# Patient Record
Sex: FEMALE | Race: WHITE | NOT HISPANIC OR LATINO | Employment: UNEMPLOYED | URBAN - METROPOLITAN AREA
[De-identification: names, ages, dates, MRNs, and addresses within clinical notes are randomized per-mention and may not be internally consistent; named-entity substitution may affect disease eponyms.]

---

## 2018-08-03 ENCOUNTER — HOSPITAL ENCOUNTER (EMERGENCY)
Facility: HOSPITAL | Age: 38
Discharge: HOME | End: 2018-08-03
Attending: FAMILY MEDICINE

## 2018-08-03 VITALS
SYSTOLIC BLOOD PRESSURE: 112 MMHG | OXYGEN SATURATION: 97 % | HEART RATE: 76 BPM | BODY MASS INDEX: 27.64 KG/M2 | RESPIRATION RATE: 20 BRPM | HEIGHT: 66 IN | DIASTOLIC BLOOD PRESSURE: 80 MMHG | WEIGHT: 172 LBS | TEMPERATURE: 98 F

## 2018-08-03 DIAGNOSIS — T78.40XA ALLERGIC REACTION, INITIAL ENCOUNTER: Primary | ICD-10-CM

## 2018-08-03 PROCEDURE — 96374 THER/PROPH/DIAG INJ IV PUSH: CPT

## 2018-08-03 PROCEDURE — 96372 THER/PROPH/DIAG INJ SC/IM: CPT

## 2018-08-03 PROCEDURE — 63600175 PHARM REV CODE 636 W HCPCS: Performed by: FAMILY MEDICINE

## 2018-08-03 PROCEDURE — 99284 EMERGENCY DEPT VISIT MOD MDM: CPT | Mod: 25

## 2018-08-03 PROCEDURE — 96375 TX/PRO/DX INJ NEW DRUG ADDON: CPT

## 2018-08-03 PROCEDURE — S0028 INJECTION, FAMOTIDINE, 20 MG: HCPCS

## 2018-08-03 PROCEDURE — 63600175 PHARM REV CODE 636 W HCPCS

## 2018-08-03 PROCEDURE — 96361 HYDRATE IV INFUSION ADD-ON: CPT

## 2018-08-03 PROCEDURE — 25000003 PHARM REV CODE 250

## 2018-08-03 PROCEDURE — 25000003 PHARM REV CODE 250: Performed by: FAMILY MEDICINE

## 2018-08-03 PROCEDURE — S0028 INJECTION, FAMOTIDINE, 20 MG: HCPCS | Performed by: FAMILY MEDICINE

## 2018-08-03 RX ORDER — ATORVASTATIN CALCIUM 10 MG/1
10 TABLET, FILM COATED ORAL DAILY
COMMUNITY

## 2018-08-03 RX ORDER — FAMOTIDINE 10 MG/ML
40 INJECTION INTRAVENOUS
Status: COMPLETED | OUTPATIENT
Start: 2018-08-03 | End: 2018-08-03

## 2018-08-03 RX ORDER — EPINEPHRINE 0.3 MG/.3ML
0.3 INJECTION SUBCUTANEOUS
Status: COMPLETED | OUTPATIENT
Start: 2018-08-03 | End: 2018-08-03

## 2018-08-03 RX ORDER — DEXAMETHASONE SODIUM PHOSPHATE 100 MG/10ML
INJECTION INTRAMUSCULAR; INTRAVENOUS
Status: COMPLETED
Start: 2018-08-03 | End: 2018-08-03

## 2018-08-03 RX ORDER — LISINOPRIL 10 MG/1
20 TABLET ORAL DAILY
COMMUNITY
End: 2020-03-02

## 2018-08-03 RX ORDER — LORAZEPAM 2 MG/ML
1 INJECTION INTRAMUSCULAR
Status: COMPLETED | OUTPATIENT
Start: 2018-08-03 | End: 2018-08-03

## 2018-08-03 RX ORDER — DEXAMETHASONE SODIUM PHOSPHATE 100 MG/10ML
10 INJECTION INTRAMUSCULAR; INTRAVENOUS
Status: COMPLETED | OUTPATIENT
Start: 2018-08-03 | End: 2018-08-03

## 2018-08-03 RX ORDER — METHYLPREDNISOLONE 4 MG/1
TABLET ORAL
Qty: 1 PACKAGE | Refills: 0 | Status: SHIPPED | OUTPATIENT
Start: 2018-08-03 | End: 2018-08-24

## 2018-08-03 RX ORDER — DIPHENHYDRAMINE HYDROCHLORIDE 50 MG/ML
50 INJECTION INTRAMUSCULAR; INTRAVENOUS
Status: COMPLETED | OUTPATIENT
Start: 2018-08-03 | End: 2018-08-03

## 2018-08-03 RX ORDER — FAMOTIDINE 10 MG/ML
20 INJECTION INTRAVENOUS
Status: COMPLETED | OUTPATIENT
Start: 2018-08-03 | End: 2018-08-03

## 2018-08-03 RX ORDER — DIPHENHYDRAMINE HYDROCHLORIDE 50 MG/ML
INJECTION INTRAMUSCULAR; INTRAVENOUS
Status: COMPLETED
Start: 2018-08-03 | End: 2018-08-03

## 2018-08-03 RX ORDER — DIPHENHYDRAMINE HYDROCHLORIDE 50 MG/ML
25 INJECTION INTRAMUSCULAR; INTRAVENOUS
Status: COMPLETED | OUTPATIENT
Start: 2018-08-03 | End: 2018-08-03

## 2018-08-03 RX ORDER — FAMOTIDINE 10 MG/ML
INJECTION INTRAVENOUS
Status: COMPLETED
Start: 2018-08-03 | End: 2018-08-03

## 2018-08-03 RX ADMIN — DIPHENHYDRAMINE HYDROCHLORIDE 50 MG: 50 INJECTION INTRAMUSCULAR; INTRAVENOUS at 03:08

## 2018-08-03 RX ADMIN — EPINEPHRINE 0.3 MG: 0.3 INJECTION INTRAMUSCULAR at 04:08

## 2018-08-03 RX ADMIN — DIPHENHYDRAMINE HYDROCHLORIDE 25 MG: 50 INJECTION, SOLUTION INTRAMUSCULAR; INTRAVENOUS at 04:08

## 2018-08-03 RX ADMIN — SODIUM CHLORIDE 1000 ML: 9 INJECTION, SOLUTION INTRAVENOUS at 03:08

## 2018-08-03 RX ADMIN — DEXAMETHASONE SODIUM PHOSPHATE 10 MG: 100 INJECTION INTRAMUSCULAR; INTRAVENOUS at 03:08

## 2018-08-03 RX ADMIN — FAMOTIDINE 40 MG: 10 INJECTION, SOLUTION INTRAVENOUS at 03:08

## 2018-08-03 RX ADMIN — DEXAMETHASONE SODIUM PHOSPHATE 10 MG: 10 INJECTION INTRAMUSCULAR; INTRAVENOUS at 03:08

## 2018-08-03 RX ADMIN — DIPHENHYDRAMINE HYDROCHLORIDE 50 MG: 50 INJECTION, SOLUTION INTRAMUSCULAR; INTRAVENOUS at 03:08

## 2018-08-03 RX ADMIN — FAMOTIDINE 20 MG: 10 INJECTION, SOLUTION INTRAVENOUS at 04:08

## 2018-08-03 RX ADMIN — FAMOTIDINE 40 MG: 10 INJECTION INTRAVENOUS at 03:08

## 2018-08-03 RX ADMIN — LORAZEPAM 1 MG: 2 INJECTION INTRAMUSCULAR; INTRAVENOUS at 04:08

## 2018-08-03 NOTE — ED PROVIDER NOTES
Encounter Date: 8/3/2018       History     Chief Complaint   Patient presents with    Allergic Reaction     insect bite to face, swelling on left side of face     Patient is a 37-year-old young woman with a history of severe allergic reaction to bee and wasp stings.  She was working in the garden today and did not feel herself get stone.  She went inside to take a shower and developed left cheek swelling that has steadily progressed.  She states she may have been bit by a mosquito but does not recall any other sting.          Review of patient's allergies indicates:   Allergen Reactions    Iodine and iodide containing products     Pcn [penicillins]      Past Medical History:   Diagnosis Date    Anxiety     Hyperlipidemia     Hypertension      History reviewed. No pertinent surgical history.  History reviewed. No pertinent family history.  Social History   Substance Use Topics    Smoking status: Current Every Day Smoker    Smokeless tobacco: Not on file    Alcohol use Yes      Comment: occ     Review of Systems   Constitutional: Negative for chills, fatigue and fever.   HENT: Positive for facial swelling. Negative for congestion, ear pain, rhinorrhea, sinus pain, sinus pressure and sore throat.    Eyes: Negative for photophobia and redness.   Respiratory: Negative for cough, shortness of breath and wheezing.    Cardiovascular: Negative for chest pain, palpitations and leg swelling.   Gastrointestinal: Negative for abdominal pain, constipation, diarrhea and nausea.   Endocrine: Negative for polydipsia, polyphagia and polyuria.   Genitourinary: Negative for difficulty urinating, dysuria, flank pain, hematuria and urgency.   Musculoskeletal: Negative for arthralgias, back pain and joint swelling.   Skin: Negative for color change.   Neurological: Negative for dizziness, seizures, syncope, weakness and headaches.   Hematological: Negative for adenopathy.   Psychiatric/Behavioral: Negative for sleep disturbance  and suicidal ideas.   All other systems reviewed and are negative.      Physical Exam     Initial Vitals [08/03/18 1440]   BP Pulse Resp Temp SpO2   (!) 150/93 88 20 98.3 °F (36.8 °C) 100 %      MAP       --         Physical Exam    Nursing note and vitals reviewed.  Constitutional: Vital signs are normal. She appears well-developed and well-nourished.   HENT:   Head: Normocephalic and atraumatic.   Edema to L cheek   Eyes: Lids are normal.   Neck: Trachea normal, normal range of motion and phonation normal. Neck supple.   Cardiovascular: Normal rate, regular rhythm, normal heart sounds, intact distal pulses and normal pulses.   Abdominal: Soft. Normal appearance and bowel sounds are normal.   Musculoskeletal: Normal range of motion.   Neurological: She is alert. GCS eye subscore is 4. GCS verbal subscore is 5. GCS motor subscore is 6.   Skin: Skin is warm, dry and intact. Capillary refill takes less than 2 seconds.   Psychiatric: She has a normal mood and affect. Her speech is normal and behavior is normal. Judgment and thought content normal. Cognition and memory are normal.         ED Course   Procedures  Labs Reviewed - No data to display       Imaging Results    None          Medical Decision Making:   ED Management:  Swelling finally starting to decrease. Pt feeling better but sleepy from Benadryl.                    ED Course as of Aug 03 1723   Fri Aug 03, 2018   1541 Symptoms not progressing.   [NL]   1626 L sided facial mildly increased. Will re-dose.  [NL]   1722 Swelling decreased.  [NL]      ED Course User Index  [NL] Ria Bales MD     Clinical Impression:   The encounter diagnosis was Allergic reaction, initial encounter.      Disposition:   Disposition: Discharged  Condition: Stable                        Ria Bales MD  08/03/18 4628

## 2018-09-16 ENCOUNTER — HOSPITAL ENCOUNTER (EMERGENCY)
Facility: HOSPITAL | Age: 38
Discharge: HOME | End: 2018-09-16
Attending: FAMILY MEDICINE

## 2018-09-16 VITALS
SYSTOLIC BLOOD PRESSURE: 137 MMHG | BODY MASS INDEX: 28.12 KG/M2 | OXYGEN SATURATION: 96 % | DIASTOLIC BLOOD PRESSURE: 94 MMHG | HEIGHT: 66 IN | HEART RATE: 103 BPM | TEMPERATURE: 99 F | RESPIRATION RATE: 18 BRPM | WEIGHT: 175 LBS

## 2018-09-16 DIAGNOSIS — J40 BRONCHITIS: Primary | ICD-10-CM

## 2018-09-16 PROCEDURE — 99283 EMERGENCY DEPT VISIT LOW MDM: CPT

## 2018-09-16 RX ORDER — HYDROCODONE BITARTRATE AND ACETAMINOPHEN 7.5; 325 MG/15ML; MG/15ML
10 SOLUTION ORAL EVERY 8 HOURS PRN
Qty: 80 ML | Refills: 0 | Status: SHIPPED | OUTPATIENT
Start: 2018-09-16 | End: 2019-03-11

## 2018-09-16 NOTE — ED PROVIDER NOTES
Encounter Date: 9/16/2018       History     Chief Complaint   Patient presents with    Nausea     onset last night    Vomiting     onset last night     Headache     onset yesterday before nausea/vomiting    Fever     onset yesterday, about 30 mins ago last time temperature was checked 100.2 at home    Cough     onset couple of days ago. 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 No pertinent surgical history.  No family history on file.  Social History     Tobacco Use    Smoking status: Current Every Day Smoker     Packs/day: 1.00     Years: 20.00     Pack years: 20.00     Types: Cigarettes    Smokeless tobacco: Never Used   Substance Use Topics    Alcohol use: Yes     Alcohol/week: 4.2 oz     Types: 7 Glasses of wine per week    Drug use: Yes     Frequency: 3.0 times per week     Types: Marijuana     Review of Systems   Constitutional: Negative for fever.   HENT: Negative for sore throat.    Respiratory: Positive for cough. Negative for shortness of breath.    Cardiovascular: Negative for chest pain.   Gastrointestinal: Negative for nausea.   Genitourinary: Negative for dysuria.   Musculoskeletal: Negative for back pain.   Skin: Negative for rash.   Neurological: Negative for weakness.   Hematological: Does not bruise/bleed easily.       Physical Exam     Initial Vitals [09/16/18 0710]   BP Pulse Resp Temp SpO2   (!) 137/94 103 18 98.5 °F (36.9 °C) 96 %      MAP       --          Physical Exam    Nursing note and vitals reviewed.  Constitutional: She appears well-developed and well-nourished. She is not diaphoretic. No distress.   HENT:   Head: Normocephalic and atraumatic.   Right Ear: External ear normal.   Left Ear: External ear normal.   Eyes: Pupils are equal, round, and reactive to light. Right eye exhibits no discharge. Left eye exhibits no discharge.   Neck: No tracheal deviation present. No JVD present.   Cardiovascular: Exam reveals no friction rub.    No murmur heard.  Pulmonary/Chest: No stridor. No respiratory distress. She has no wheezes. She has no rales.   Abdominal: Bowel sounds are normal. She exhibits no distension.   Musculoskeletal: Normal range of motion.   Neurological: She is alert.   Skin: Skin is warm.   Psychiatric: She has a normal mood and affect.         ED Course   Procedures  Labs Reviewed - No data to display       Imaging Results    None                               Clinical Impression:   The encounter diagnosis was Bronchitis.                             Jimmy Luo MD  09/16/18 2433

## 2019-03-11 ENCOUNTER — HOSPITAL ENCOUNTER (EMERGENCY)
Facility: HOSPITAL | Age: 39
Discharge: HOME | End: 2019-03-11
Attending: FAMILY MEDICINE

## 2019-03-11 VITALS
DIASTOLIC BLOOD PRESSURE: 90 MMHG | RESPIRATION RATE: 20 BRPM | BODY MASS INDEX: 29.32 KG/M2 | OXYGEN SATURATION: 99 % | TEMPERATURE: 98 F | SYSTOLIC BLOOD PRESSURE: 129 MMHG | HEART RATE: 89 BPM | WEIGHT: 176 LBS | HEIGHT: 65 IN

## 2019-03-11 DIAGNOSIS — G43.909 MIGRAINE WITHOUT STATUS MIGRAINOSUS, NOT INTRACTABLE, UNSPECIFIED MIGRAINE TYPE: Primary | ICD-10-CM

## 2019-03-11 PROCEDURE — 99284 EMERGENCY DEPT VISIT MOD MDM: CPT | Mod: 25

## 2019-03-11 PROCEDURE — 63600175 PHARM REV CODE 636 W HCPCS: Performed by: NURSE PRACTITIONER

## 2019-03-11 PROCEDURE — 96372 THER/PROPH/DIAG INJ SC/IM: CPT

## 2019-03-11 RX ORDER — KETOROLAC TROMETHAMINE 30 MG/ML
60 INJECTION, SOLUTION INTRAMUSCULAR; INTRAVENOUS
Status: COMPLETED | OUTPATIENT
Start: 2019-03-11 | End: 2019-03-11

## 2019-03-11 RX ORDER — BUTALBITAL, ACETAMINOPHEN AND CAFFEINE 50; 325; 40 MG/1; MG/1; MG/1
1 TABLET ORAL EVERY 4 HOURS PRN
Qty: 15 TABLET | Refills: 0 | Status: SHIPPED | OUTPATIENT
Start: 2019-03-11 | End: 2019-04-10

## 2019-03-11 RX ORDER — PROMETHAZINE HYDROCHLORIDE 25 MG/ML
25 INJECTION, SOLUTION INTRAMUSCULAR; INTRAVENOUS
Status: COMPLETED | OUTPATIENT
Start: 2019-03-11 | End: 2019-03-11

## 2019-03-11 RX ORDER — DEXAMETHASONE SODIUM PHOSPHATE 100 MG/10ML
10 INJECTION INTRAMUSCULAR; INTRAVENOUS
Status: COMPLETED | OUTPATIENT
Start: 2019-03-11 | End: 2019-03-11

## 2019-03-11 RX ADMIN — DEXAMETHASONE SODIUM PHOSPHATE 10 MG: 10 INJECTION INTRAMUSCULAR; INTRAVENOUS at 05:03

## 2019-03-11 RX ADMIN — KETOROLAC TROMETHAMINE 60 MG: 30 INJECTION, SOLUTION INTRAMUSCULAR at 05:03

## 2019-03-11 RX ADMIN — PROMETHAZINE HYDROCHLORIDE 25 MG: 25 INJECTION INTRAMUSCULAR; INTRAVENOUS at 05:03

## 2019-03-11 NOTE — DISCHARGE INSTRUCTIONS
Follow up with PCP. Avoid known migraine triggers. Worsening in symptoms return to ER for further evaluation. Take medication as prescribed.

## 2019-03-11 NOTE — ED PROVIDER NOTES
Encounter Date: 3/11/2019       History     Chief Complaint   Patient presents with    Headache     Patient to ER for migraine headache. Reports started yesterday. Has had nausea/vomiting today due to pain. Light sensitivity. Reports history of migraines-hasn't had one in years. Denies any other issues.           Review of patient's allergies indicates:   Allergen Reactions    Pcn [penicillins] Anaphylaxis    Iodine and iodide containing products Itching and Rash     Past Medical History:   Diagnosis Date    Anxiety     Hyperlipidemia     Hypertension     Migraine headache      History reviewed. No pertinent surgical history.  History reviewed. No pertinent family history.  Social History     Tobacco Use    Smoking status: Current Every Day Smoker     Packs/day: 1.00     Years: 20.00     Pack years: 20.00     Types: Cigarettes    Smokeless tobacco: Never Used   Substance Use Topics    Alcohol use: Yes     Alcohol/week: 4.2 oz     Types: 7 Glasses of wine per week    Drug use: Yes     Frequency: 3.0 times per week     Types: Marijuana     Review of Systems   Gastrointestinal: Positive for nausea and vomiting.   Neurological: Positive for headaches.   All other systems reviewed and are negative.      Physical Exam     Initial Vitals [03/11/19 1650]   BP Pulse Resp Temp SpO2   (!) 129/90 89 20 97.9 °F (36.6 °C) 99 %      MAP       --         Physical Exam    Nursing note and vitals reviewed.  Constitutional: She appears well-developed and well-nourished.   HENT:   Head: Normocephalic.   Eyes: Conjunctivae and EOM are normal. Pupils are equal, round, and reactive to light.   Neck: Normal range of motion. Neck supple.   Cardiovascular: Normal rate, regular rhythm and normal heart sounds.   Pulmonary/Chest: Breath sounds normal.   Musculoskeletal: Normal range of motion.   Neurological: She is alert and oriented to person, place, and time. She has normal strength.   Skin: Skin is warm.   Psychiatric: She has a  normal mood and affect. Her behavior is normal. Judgment and thought content normal.         ED Course   Procedures  Labs Reviewed - No data to display       Imaging Results    None           Toradol 60mg IM   Decadron 10mg IM   Phenergan 25mg IM                    Clinical Impression:       ICD-10-CM ICD-9-CM   1. Migraine without status migrainosus, not intractable, unspecified migraine type G43.909 346.90                                ERLINDA Callahan  03/11/19 1707

## 2019-05-25 ENCOUNTER — HOSPITAL ENCOUNTER (EMERGENCY)
Facility: HOSPITAL | Age: 39
Discharge: HOME | End: 2019-05-25

## 2019-05-25 VITALS
DIASTOLIC BLOOD PRESSURE: 84 MMHG | HEART RATE: 90 BPM | BODY MASS INDEX: 29.49 KG/M2 | RESPIRATION RATE: 20 BRPM | WEIGHT: 177 LBS | SYSTOLIC BLOOD PRESSURE: 116 MMHG | OXYGEN SATURATION: 99 % | TEMPERATURE: 98 F | HEIGHT: 65 IN

## 2019-05-25 DIAGNOSIS — R07.9 CHEST PAIN: ICD-10-CM

## 2019-05-25 DIAGNOSIS — R10.9 FLANK PAIN: Primary | ICD-10-CM

## 2019-05-25 PROBLEM — R87.810 CERVICAL HIGH RISK HUMAN PAPILLOMAVIRUS (HPV) DNA TEST POSITIVE: Status: ACTIVE | Noted: 2019-05-25

## 2019-05-25 PROBLEM — F41.9 ANXIETY: Status: ACTIVE | Noted: 2019-05-25

## 2019-05-25 PROBLEM — E78.2 MIXED HYPERLIPIDEMIA: Status: ACTIVE | Noted: 2019-05-25

## 2019-05-25 PROBLEM — R87.610 ATYPICAL SQUAMOUS CELLS OF UNDETERMINED SIGNIFICANCE ON CYTOLOGIC SMEAR OF CERVIX (ASC-US): Status: ACTIVE | Noted: 2019-05-25

## 2019-05-25 LAB
BACTERIA #/AREA URNS HPF: NORMAL /HPF
BILIRUB UR QL STRIP: NEGATIVE
CLARITY UR: CLEAR
COLOR UR: YELLOW
GLUCOSE UR QL STRIP: NEGATIVE
HGB UR QL STRIP: ABNORMAL
KETONES UR QL STRIP: NEGATIVE
LEUKOCYTE ESTERASE UR QL STRIP: ABNORMAL
MICROSCOPIC COMMENT: NORMAL
NITRITE UR QL STRIP: NEGATIVE
PH UR STRIP: 6 [PH] (ref 5–8)
PROT UR QL STRIP: NEGATIVE
RBC #/AREA URNS HPF: 3 /HPF (ref 0–4)
SP GR UR STRIP: 1.01 (ref 1–1.03)
SQUAMOUS #/AREA URNS HPF: >100 /HPF
URN SPEC COLLECT METH UR: ABNORMAL
UROBILINOGEN UR STRIP-ACNC: NEGATIVE EU/DL
WBC #/AREA URNS HPF: 3 /HPF (ref 0–5)

## 2019-05-25 PROCEDURE — 81000 URINALYSIS NONAUTO W/SCOPE: CPT

## 2019-05-25 PROCEDURE — 74176 CT ABD & PELVIS W/O CONTRAST: CPT | Mod: 26,,, | Performed by: RADIOLOGY

## 2019-05-25 PROCEDURE — 74176 CT RENAL STONE STUDY ABD PELVIS WO: ICD-10-PCS | Mod: 26,,, | Performed by: RADIOLOGY

## 2019-05-25 PROCEDURE — 74176 CT ABD & PELVIS W/O CONTRAST: CPT | Mod: TC

## 2019-05-25 PROCEDURE — 99284 EMERGENCY DEPT VISIT MOD MDM: CPT

## 2019-05-25 PROCEDURE — 93005 ELECTROCARDIOGRAM TRACING: CPT

## 2019-05-25 RX ORDER — CHLORTHALIDONE 25 MG/1
25 TABLET ORAL DAILY
COMMUNITY

## 2019-05-25 NOTE — ED PROVIDER NOTES
CHIEF COMPLAINT  Chief Complaint   Patient presents with    Flank Pain     left    Chest Pain    Dizziness       HPI  Kaylene Garcia is a 38 y.o. female who presents to the ED with complaints of left flank pain.  States symptoms started yesterday.  She generally does not feel well. No dysuria.  Pain is intermittent.Generally has not been feeling well. Was at work and started having the pain and felt dizzy so she left work and came to the ED for evaluation.      CURRENT MEDICATIONS  No current facility-administered medications on file prior to encounter.      Current Outpatient Medications on File Prior to Encounter   Medication Sig Dispense Refill    chlorthalidone (HYGROTEN) 25 MG Tab Take 25 mg by mouth once daily.      atorvastatin (LIPITOR) 10 MG tablet Take 10 mg by mouth once daily.      lisinopril 10 MG tablet Take 20 mg by mouth once daily.       [DISCONTINUED] norgestimate-ethinyl estradiol (SPRINTEC, 28, ORAL) Take by mouth.         ALLERGIES  Review of patient's allergies indicates:   Allergen Reactions    Pcn [penicillins] Anaphylaxis    Iodine and iodide containing products Itching and Rash         There is no immunization history on file for this patient.    PAST MEDICAL HISTORY  Past Medical History:   Diagnosis Date    Anxiety     Hyperlipidemia     Hypertension     Migraine headache        SURGICAL HISTORY  History reviewed. No pertinent surgical history.    SOCIAL HISTORY  Social History     Socioeconomic History    Marital status:      Spouse name: Not on file    Number of children: Not on file    Years of education: Not on file    Highest education level: Not on file   Occupational History    Not on file   Social Needs    Financial resource strain: Not on file    Food insecurity:     Worry: Not on file     Inability: Not on file    Transportation needs:     Medical: Not on file     Non-medical: Not on file   Tobacco Use    Smoking status: Current Every Day Smoker      "Packs/day: 1.00     Years: 20.00     Pack years: 20.00     Types: Cigarettes    Smokeless tobacco: Never Used   Substance and Sexual Activity    Alcohol use: Yes     Alcohol/week: 4.2 oz     Types: 7 Glasses of wine per week    Drug use: Yes     Frequency: 3.0 times per week     Types: Marijuana    Sexual activity: Yes     Partners: Male     Birth control/protection: None   Lifestyle    Physical activity:     Days per week: Not on file     Minutes per session: Not on file    Stress: Not on file   Relationships    Social connections:     Talks on phone: Not on file     Gets together: Not on file     Attends Buddhism service: Not on file     Active member of club or organization: Not on file     Attends meetings of clubs or organizations: Not on file     Relationship status: Not on file   Other Topics Concern    Not on file   Social History Narrative    Not on file       FAMILY HISTORY  History reviewed. No pertinent family history.    REVIEW OF SYSTEMS  Constitutional: No fever, chills, or weakness.  Eyes: No redness, pain, or discharge  HENT: No ear pain, no headache, no rhinorrhea, no throat pain  Respiratory: No cough, wheezing or shortness of breath  Cardiovascular: No chest pain, pain is in lower left chest and flank area. No  palpitations or edema  GI: + left flank pain,  abdominal pain, no nausea, vomiting or diarrhea  Gu: No dysuria, no hematuria, or discharge  Musculoskeletal: No pain, full range of motion. Good sensation  Skin: No rash or abrasion  Neurologic: No focal weakness or sensory changes.  All systems otherwise negative except as noted in the Review of Systems and History of Present Illness      PHYSICAL EXAM  Reviewed Triage Note  VITAL SIGNS:   Patient Vitals for the past 24 hrs:   BP Temp Temp src Pulse Resp SpO2 Height Weight   05/25/19 1839 116/84 98.3 °F (36.8 °C) Oral 90 20 99 % 5' 5" (1.651 m) 80.3 kg (177 lb)     Constitutional: Well developed, well nourished, Alert and oriented " x3, No acute distress, non-toxic appearance.  HENT: Normocephalic, Atraumatic, Bilateral external ears normal, external nose negative, oropharynx moist, No oral exudates.  Eyes: PERRL, EOMI, Conjunctiva normal, No discharge.  Neck: Normal range of motion, no tenderness, supple, no carotid bruits  Respiratory: Normal breath sounds, no respiratory distress, no wheezing, no rhonchi, no rales  Cardiovascular: Normal heart rate, normal rhythm, no murmurs, no rubs, no gallops.  Gi: Bowel sounds normal, soft, no tenderness, non-distended, no masses, no pulsatile masses.  Musculoskeletal: No edema, no tenderness, no cyanosis, no clubbing. Good range of motion in all major joints. No tenderness to palpation or major deformities noted.   Integument: Warm, Dry, No erythema, no rash  Neurologic: Normal motor function, normal sensory function. No focal deficits noted. Intact distal pulses  Psychiatric: Affect normal, judgment normal, mood normal      LABS  Pertinent labs reviewed. (see chart for details)  Labs Reviewed   URINALYSIS, REFLEX TO URINE CULTURE - Abnormal; Notable for the following components:       Result Value    Occult Blood UA Trace (*)     Leukocytes, UA Trace (*)     All other components within normal limits    Narrative:     Preferred Collection Type->Urine, Clean Catch   URINALYSIS MICROSCOPIC    Narrative:     Preferred Collection Type->Urine, Clean Catch       RADIOLOGY  CT Renal Stone Study ABD Pelvis WO    (Results Pending)         PROCEDURE  Procedures      ED COURSE & MEDICAL DECISION MAKING     MDM       Physical exam findings discussed with patient. No acute emergent medical condition identified at this time to warrant further testing. Will dispo home with instructions to follow up with PCP, return to the ED for worsening condition. Pt agrees with plan of care.     DISPOSITION  Patient discharged in stable condition 5/25/2019  8:31 PM      CLINICAL IMPRESSION:  The primary encounter diagnosis was  Flank pain. A diagnosis of Chest pain was also pertinent to this visit.    Patient advised to follow-up with your PCP within 3 days for BP re-check if Blood Pressure was >120/80 without history of hypertension.         ERLINDA Crowley  05/25/19 2036

## 2019-10-05 ENCOUNTER — HOSPITAL ENCOUNTER (EMERGENCY)
Facility: HOSPITAL | Age: 39
Discharge: HOME | End: 2019-10-05

## 2019-10-05 VITALS
HEIGHT: 65 IN | DIASTOLIC BLOOD PRESSURE: 72 MMHG | OXYGEN SATURATION: 98 % | RESPIRATION RATE: 18 BRPM | WEIGHT: 160 LBS | HEART RATE: 69 BPM | SYSTOLIC BLOOD PRESSURE: 114 MMHG | BODY MASS INDEX: 26.66 KG/M2 | TEMPERATURE: 98 F

## 2019-10-05 DIAGNOSIS — R11.2 INTRACTABLE VOMITING WITH NAUSEA, UNSPECIFIED VOMITING TYPE: ICD-10-CM

## 2019-10-05 DIAGNOSIS — K52.9 GASTROENTERITIS: Primary | ICD-10-CM

## 2019-10-05 LAB
ALBUMIN SERPL BCP-MCNC: 4.2 G/DL (ref 3.5–5.2)
ALP SERPL-CCNC: 69 U/L (ref 55–135)
ALT SERPL W/O P-5'-P-CCNC: 29 U/L (ref 10–44)
ANION GAP SERPL CALC-SCNC: 11 MMOL/L (ref 8–16)
AST SERPL-CCNC: 29 U/L (ref 10–40)
B-HCG UR QL: NEGATIVE
BASOPHILS # BLD AUTO: 0.09 K/UL (ref 0–0.2)
BASOPHILS NFR BLD: 1 % (ref 0–1.9)
BILIRUB SERPL-MCNC: 0.4 MG/DL (ref 0.1–1)
BILIRUB UR QL STRIP: NEGATIVE
BUN SERPL-MCNC: 13 MG/DL (ref 6–20)
CALCIUM SERPL-MCNC: 9 MG/DL (ref 8.7–10.5)
CHLORIDE SERPL-SCNC: 101 MMOL/L (ref 95–110)
CLARITY UR: CLEAR
CO2 SERPL-SCNC: 23 MMOL/L (ref 23–29)
COLOR UR: YELLOW
CREAT SERPL-MCNC: 0.8 MG/DL (ref 0.5–1.4)
DIFFERENTIAL METHOD: ABNORMAL
EOSINOPHIL # BLD AUTO: 0.2 K/UL (ref 0–0.5)
EOSINOPHIL NFR BLD: 2.7 % (ref 0–8)
ERYTHROCYTE [DISTWIDTH] IN BLOOD BY AUTOMATED COUNT: 14.1 % (ref 11.5–14.5)
EST. GFR  (AFRICAN AMERICAN): >60 ML/MIN/1.73 M^2
EST. GFR  (NON AFRICAN AMERICAN): >60 ML/MIN/1.73 M^2
GLUCOSE SERPL-MCNC: 89 MG/DL (ref 70–110)
GLUCOSE UR QL STRIP: NEGATIVE
HCT VFR BLD AUTO: 39.5 % (ref 37–48.5)
HGB BLD-MCNC: 13.6 G/DL (ref 12–16)
HGB UR QL STRIP: ABNORMAL
IMM GRANULOCYTES # BLD AUTO: 0.06 K/UL (ref 0–0.04)
IMM GRANULOCYTES NFR BLD AUTO: 0.7 % (ref 0–0.5)
KETONES UR QL STRIP: NEGATIVE
LEUKOCYTE ESTERASE UR QL STRIP: NEGATIVE
LIPASE SERPL-CCNC: 39 U/L (ref 4–60)
LYMPHOCYTES # BLD AUTO: 1.7 K/UL (ref 1–4.8)
LYMPHOCYTES NFR BLD: 19.4 % (ref 18–48)
MCH RBC QN AUTO: 33.7 PG (ref 27–31)
MCHC RBC AUTO-ENTMCNC: 34.4 G/DL (ref 32–36)
MCV RBC AUTO: 98 FL (ref 82–98)
MICROSCOPIC COMMENT: ABNORMAL
MONOCYTES # BLD AUTO: 0.7 K/UL (ref 0.3–1)
MONOCYTES NFR BLD: 7.7 % (ref 4–15)
NEUTROPHILS # BLD AUTO: 6.2 K/UL (ref 1.8–7.7)
NEUTROPHILS NFR BLD: 68.5 % (ref 38–73)
NITRITE UR QL STRIP: NEGATIVE
NRBC BLD-RTO: 0 /100 WBC
PH UR STRIP: 7 [PH] (ref 5–8)
PLATELET # BLD AUTO: 342 K/UL (ref 150–350)
PMV BLD AUTO: 9.9 FL (ref 9.2–12.9)
POTASSIUM SERPL-SCNC: 3.9 MMOL/L (ref 3.5–5.1)
PROT SERPL-MCNC: 7.4 G/DL (ref 6–8.4)
PROT UR QL STRIP: ABNORMAL
RBC # BLD AUTO: 4.04 M/UL (ref 4–5.4)
RBC #/AREA URNS HPF: 50 /HPF (ref 0–4)
SODIUM SERPL-SCNC: 135 MMOL/L (ref 136–145)
SP GR UR STRIP: 1.02 (ref 1–1.03)
SQUAMOUS #/AREA URNS HPF: 3 /HPF
URN SPEC COLLECT METH UR: ABNORMAL
UROBILINOGEN UR STRIP-ACNC: NEGATIVE EU/DL
WBC # BLD AUTO: 8.97 K/UL (ref 3.9–12.7)
WBC #/AREA URNS HPF: 2 /HPF (ref 0–5)

## 2019-10-05 PROCEDURE — 80053 COMPREHEN METABOLIC PANEL: CPT

## 2019-10-05 PROCEDURE — 99284 EMERGENCY DEPT VISIT MOD MDM: CPT | Mod: 25

## 2019-10-05 PROCEDURE — 81025 URINE PREGNANCY TEST: CPT

## 2019-10-05 PROCEDURE — 81000 URINALYSIS NONAUTO W/SCOPE: CPT

## 2019-10-05 PROCEDURE — 96365 THER/PROPH/DIAG IV INF INIT: CPT

## 2019-10-05 PROCEDURE — 63600175 PHARM REV CODE 636 W HCPCS: Performed by: INTERNAL MEDICINE

## 2019-10-05 PROCEDURE — 85025 COMPLETE CBC W/AUTO DIFF WBC: CPT

## 2019-10-05 PROCEDURE — 96361 HYDRATE IV INFUSION ADD-ON: CPT

## 2019-10-05 PROCEDURE — 83690 ASSAY OF LIPASE: CPT

## 2019-10-05 RX ORDER — METRONIDAZOLE 500 MG/1
500 TABLET ORAL 3 TIMES DAILY
Qty: 21 TABLET | Refills: 0 | Status: SHIPPED | OUTPATIENT
Start: 2019-10-05 | End: 2019-10-12

## 2019-10-05 RX ORDER — FLUCONAZOLE 200 MG/1
200 TABLET ORAL DAILY
Qty: 7 TABLET | Refills: 0 | Status: SHIPPED | OUTPATIENT
Start: 2019-10-05 | End: 2019-10-12

## 2019-10-05 RX ORDER — CIPROFLOXACIN 500 MG/1
500 TABLET ORAL 2 TIMES DAILY
Qty: 20 TABLET | Refills: 0 | Status: SHIPPED | OUTPATIENT
Start: 2019-10-05 | End: 2019-10-15

## 2019-10-05 RX ORDER — PROMETHAZINE HYDROCHLORIDE 25 MG/1
25 TABLET ORAL EVERY 6 HOURS PRN
Qty: 15 TABLET | Refills: 0 | Status: SHIPPED | OUTPATIENT
Start: 2019-10-05 | End: 2020-01-08 | Stop reason: CLARIF

## 2019-10-05 RX ADMIN — PROMETHAZINE HYDROCHLORIDE 6.25 MG: 25 INJECTION INTRAMUSCULAR; INTRAVENOUS at 04:10

## 2019-10-05 RX ADMIN — SODIUM CHLORIDE 1000 ML: 0.9 INJECTION, SOLUTION INTRAVENOUS at 02:10

## 2019-10-05 NOTE — ED PROVIDER NOTES
Encounter Date: 10/5/2019       History     Chief Complaint   Patient presents with    Vomiting     and diarrhea was sent home from work     Patient is a 39-year-old female that complains of vomiting.  She states this has been going on for the past 2 days.  She states that she feels that she is empty and only has dry heaves at this time.  She stated it started actually a couple days ago but denies any other food or intake that would have caused this.  She denies any ill contacts.  Otherwise patient has past medical history significant for anxiety hyperlipidemia and hypertension.  She denies any fever or chills but has had multiple episodes of nausea vomiting.  She also complains of lower abdominal cramping.        Review of patient's allergies indicates:   Allergen Reactions    Pcn [penicillins] Anaphylaxis    Iodine and iodide containing products Itching and Rash     Past Medical History:   Diagnosis Date    Anxiety     Hyperlipidemia     Hypertension     Migraine headache      History reviewed. No pertinent surgical history.  History reviewed. No pertinent family history.  Social History     Tobacco Use    Smoking status: Current Every Day Smoker     Packs/day: 1.00     Years: 20.00     Pack years: 20.00     Types: Cigarettes    Smokeless tobacco: Never Used   Substance Use Topics    Alcohol use: Yes     Alcohol/week: 7.0 standard drinks     Types: 7 Glasses of wine per week    Drug use: Yes     Frequency: 3.0 times per week     Types: Marijuana     Review of Systems   Constitutional: Negative for activity change, appetite change, fatigue and fever.   HENT: Negative for congestion, ear discharge, mouth sores, nosebleeds, rhinorrhea, sinus pressure, sinus pain and tinnitus.    Eyes: Negative.  Negative for pain, redness and itching.   Respiratory: Negative for apnea, cough, choking, chest tightness, shortness of breath, wheezing and stridor.    Cardiovascular: Negative for chest pain, palpitations and  leg swelling.   Gastrointestinal: Positive for abdominal pain, nausea and vomiting. Negative for abdominal distention, anal bleeding, blood in stool, constipation and diarrhea.   Endocrine: Negative.    Genitourinary: Negative for difficulty urinating, flank pain, frequency and urgency.   Musculoskeletal: Positive for arthralgias. Negative for back pain, gait problem and myalgias.   Skin: Negative for color change and pallor.   Allergic/Immunologic: Negative.    Neurological: Positive for dizziness and light-headedness. Negative for facial asymmetry, weakness and headaches.   Hematological: Negative for adenopathy. Does not bruise/bleed easily.   Psychiatric/Behavioral: The patient is nervous/anxious.        Physical Exam     Initial Vitals [10/05/19 1319]   BP Pulse Resp Temp SpO2   127/84 103 20 98 °F (36.7 °C) 99 %      MAP       --         Physical Exam    Nursing note and vitals reviewed.  Constitutional: She appears well-developed and well-nourished.   HENT:   Head: Normocephalic and atraumatic.   Eyes: Conjunctivae and EOM are normal. Pupils are equal, round, and reactive to light.   Neck: Normal range of motion. Neck supple.   Cardiovascular: Normal rate, regular rhythm and normal heart sounds.   Abdominal: Soft. Bowel sounds are normal. There is tenderness. There is guarding.   Musculoskeletal: Normal range of motion.   Neurological: She is alert and oriented to person, place, and time. GCS score is 15. GCS eye subscore is 4. GCS verbal subscore is 5. GCS motor subscore is 6.   Skin: Skin is warm and dry.   Psychiatric: She has a normal mood and affect.         ED Course   Procedures  Labs Reviewed   CBC W/ AUTO DIFFERENTIAL - Abnormal; Notable for the following components:       Result Value    Mean Corpuscular Hemoglobin 33.7 (*)     Immature Granulocytes 0.7 (*)     Immature Grans (Abs) 0.06 (*)     All other components within normal limits   URINALYSIS, REFLEX TO URINE CULTURE - Abnormal; Notable for  the following components:    Protein, UA Trace (*)     Occult Blood UA 3+ (*)     All other components within normal limits    Narrative:     Preferred Collection Type->Urine, Clean Catch   URINALYSIS MICROSCOPIC - Abnormal; Notable for the following components:    RBC, UA 50 (*)     All other components within normal limits    Narrative:     Preferred Collection Type->Urine, Clean Catch   PREGNANCY TEST, URINE RAPID   LIPASE   COMPREHENSIVE METABOLIC PANEL          Imaging Results    None                            ED Course as of Oct 05 1605   Sat Oct 05, 2019   1600 RBC, UA(!): 50 [JK]   1601 Protein, UA(!): Trace [JK]   1601 Occult Blood UA(!): 3+ [JK]   1601 Labs generally unremarkable no evidence of urinary tract infection red blood cells in the urine or count of 4 by patient currently on ventral cycle   Sodium(!): 135 [JK]   1602 ECG shows normal sinus rhythm normal ECG no acute changes.    [JK]      ED Course User Index  [JK] Jak Bettencourt MD     Clinical Impression:       ICD-10-CM ICD-9-CM   1. Gastroenteritis K52.9 558.9   2. Intractable vomiting with nausea, unspecified vomiting type R11.2 536.2                                Jak Bettencourt MD  10/05/19 8122

## 2020-01-08 ENCOUNTER — HOSPITAL ENCOUNTER (EMERGENCY)
Facility: HOSPITAL | Age: 40
Discharge: HOME | End: 2020-01-08
Attending: INTERNAL MEDICINE

## 2020-01-08 VITALS
BODY MASS INDEX: 28.32 KG/M2 | SYSTOLIC BLOOD PRESSURE: 119 MMHG | OXYGEN SATURATION: 98 % | TEMPERATURE: 99 F | RESPIRATION RATE: 20 BRPM | HEART RATE: 98 BPM | WEIGHT: 170 LBS | HEIGHT: 65 IN | DIASTOLIC BLOOD PRESSURE: 92 MMHG

## 2020-01-08 DIAGNOSIS — L02.91 ABSCESS: Primary | ICD-10-CM

## 2020-01-08 PROCEDURE — 87070 CULTURE OTHR SPECIMN AEROBIC: CPT

## 2020-01-08 PROCEDURE — 99284 EMERGENCY DEPT VISIT MOD MDM: CPT | Mod: 25

## 2020-01-08 PROCEDURE — 25000003 PHARM REV CODE 250: Performed by: NURSE PRACTITIONER

## 2020-01-08 PROCEDURE — 10060 I&D ABSCESS SIMPLE/SINGLE: CPT

## 2020-01-08 RX ORDER — SULFAMETHOXAZOLE AND TRIMETHOPRIM 800; 160 MG/1; MG/1
1 TABLET ORAL 2 TIMES DAILY
Qty: 20 TABLET | Refills: 0 | Status: SHIPPED | OUTPATIENT
Start: 2020-01-08 | End: 2020-01-18

## 2020-01-08 RX ORDER — LIDOCAINE HYDROCHLORIDE 10 MG/ML
1 INJECTION INFILTRATION; PERINEURAL
Status: COMPLETED | OUTPATIENT
Start: 2020-01-08 | End: 2020-01-08

## 2020-01-08 RX ORDER — BACITRACIN ZINC 500 UNIT/G
OINTMENT (GRAM) TOPICAL 2 TIMES DAILY
Qty: 30 G | Refills: 0 | COMMUNITY
Start: 2020-01-08 | End: 2020-01-18

## 2020-01-08 RX ORDER — FLUCONAZOLE 150 MG/1
150 TABLET ORAL ONCE
Qty: 1 TABLET | Refills: 0 | Status: SHIPPED | OUTPATIENT
Start: 2020-01-08 | End: 2020-01-08

## 2020-01-08 RX ADMIN — LIDOCAINE HYDROCHLORIDE 1 ML: 10 INJECTION, SOLUTION INFILTRATION; PERINEURAL at 12:01

## 2020-01-08 NOTE — DISCHARGE INSTRUCTIONS
Taking antibiotics as prescribed    Warm compresses    Motrin for pain    Return to ER if symptoms worsen    Good hand hygiene

## 2020-01-08 NOTE — ED PROVIDER NOTES
Encounter Date: 1/8/2020       History     Chief Complaint   Patient presents with    Abscess     Patient complaining of an abscess on her left buttocks for several months.     Kaylene Garcia is a 39 y.o female with past medical history including anxiety, hyperlipidemia, hypertension and migraine. She presents to ED with abscess     Patient reports abscess to left inner aspect of buttock near rectum for a month. She reports that it was there a month early that drained. She reports that it never completely resolved. She started having increased pain the past couple of days    Patient with 3 cm x 2 cm area of mild induration and fluctuance. Actively draining when manipulated.     No fever, chills, dyspnea, weakness or vomiting          Abscess    This is a recurrent problem. The current episode started several days ago. The problem occurs frequently. The problem has been gradually worsening. The abscess is present on the left buttock. The pain is at a severity of 7/10. The abscess is characterized by painfulness. Pertinent negatives include no anorexia, no decrease in physical activity, not sleeping less, not drinking less, no fever, no diarrhea, no vomiting, no congestion, no rhinorrhea, no sore throat, no decreased responsiveness and no cough. Her past medical history does not include skin abscesses in family. There were no sick contacts.     Review of patient's allergies indicates:   Allergen Reactions    Pcn [penicillins] Anaphylaxis    Iodine and iodide containing products Itching and Rash     Past Medical History:   Diagnosis Date    Anxiety     Hyperlipidemia     Hypertension     Migraine headache      History reviewed. No pertinent surgical history.  History reviewed. No pertinent family history.  Social History     Tobacco Use    Smoking status: Current Every Day Smoker     Packs/day: 1.00     Years: 20.00     Pack years: 20.00     Types: Cigarettes    Smokeless tobacco: Never Used   Substance Use  Topics    Alcohol use: Yes     Alcohol/week: 7.0 standard drinks     Types: 7 Glasses of wine per week    Drug use: Yes     Frequency: 3.0 times per week     Types: Marijuana     Review of Systems   Constitutional: Negative.  Negative for decreased responsiveness and fever.   HENT: Negative.  Negative for congestion, rhinorrhea and sore throat.    Eyes: Negative.    Respiratory: Negative.  Negative for cough and shortness of breath.    Cardiovascular: Negative.  Negative for chest pain.   Gastrointestinal: Negative.  Negative for anorexia, diarrhea, nausea and vomiting.   Endocrine: Negative.    Genitourinary: Negative.  Negative for dysuria.   Musculoskeletal: Negative.  Negative for back pain.   Skin: Positive for wound. Negative for rash.   Allergic/Immunologic: Negative.    Neurological: Negative.  Negative for weakness.   Hematological: Negative.  Does not bruise/bleed easily.   Psychiatric/Behavioral: Negative.    All other systems reviewed and are negative.      Physical Exam     Initial Vitals [01/08/20 1112]   BP Pulse Resp Temp SpO2   (!) 119/92 98 20 98.7 °F (37.1 °C) 98 %      MAP       --         Physical Exam    Nursing note and vitals reviewed.  Constitutional: She appears well-developed and well-nourished.   HENT:   Head: Normocephalic.   Eyes: Conjunctivae are normal.   Neck: Normal range of motion. Neck supple.   Cardiovascular: Normal rate.   Pulmonary/Chest: Breath sounds normal.   Musculoskeletal: Normal range of motion.   Neurological: She is alert and oriented to person, place, and time. GCS score is 15. GCS eye subscore is 4. GCS verbal subscore is 5. GCS motor subscore is 6.   Skin: Skin is warm. Capillary refill takes less than 2 seconds. Abscess noted.        Psychiatric: She has a normal mood and affect. Her behavior is normal. Judgment and thought content normal.         ED Course   I & D - Incision and Drainage  Date/Time: 1/8/2020 12:53 PM  Performed by: Jessika Rios  NP  Authorized by: John Cotter MD   Type: abscess  Location: left buttock.    Anesthesia:  Local Anesthetic: lidocaine 1% without epinephrine  Anesthetic total (ml): 2.  Scalpel size: 10  Incision type: single straight  Complexity: simple  Drainage: serosanguinous  Drainage amount: moderate  Wound treatment: incision,  expression of material and  wound packed  Packing material: 1/4 in gauze  Patient tolerance: Patient tolerated the procedure well with no immediate complications        Labs Reviewed   CULTURE, AEROBIC  (SPECIFY SOURCE)          Imaging Results    None          Medical Decision Making:   Initial Assessment:   Patient with complaint with abscess     Patient reports abscess to left inner aspect of buttock near rectum for a month. She reports that it was there a month early that drained. She reports that it never completely resolved. She started having increased pain the past couple of days    Patient with 3 cm x 2 cm area of mild induration and fluctuance. Actively draining when manipulated.     No fever, chills, dyspnea, weakness or vomiting        Differential Diagnosis:   Cellulitis, abscess, sepsis  ED Management:  I&D performed. Patient tolerating well    Discussed physical exam findings with patient  No acute emergent medical condition identified at this time to warrant further testing/diagnostics  At this time, I believe the patient is clinically stable for discharge.   Patient to follow up with PCP in 1-2 days for packing removal and wound removal  The patient acknowledges that close follow up with a MD is required after all ER visits  Pt given instructions; take all medications prescribed in the ER as directed.   Patient agrees to comply with all instruction and direction given in the ER  Pt agrees to return to ER if any symptoms reoccur                                          Clinical Impression:       ICD-10-CM ICD-9-CM   1. Abscess L02.91 682.9                             Jessika  HARSH Rios  01/08/20 1250

## 2020-01-11 LAB — BACTERIA SPEC AEROBE CULT: NORMAL

## 2020-02-12 ENCOUNTER — ANESTHESIA (OUTPATIENT)
Dept: SURGERY | Facility: HOSPITAL | Age: 40
End: 2020-02-12

## 2020-02-12 ENCOUNTER — ANESTHESIA EVENT (OUTPATIENT)
Dept: SURGERY | Facility: HOSPITAL | Age: 40
End: 2020-02-12

## 2020-02-12 ENCOUNTER — HOSPITAL ENCOUNTER (OUTPATIENT)
Facility: HOSPITAL | Age: 40
Discharge: HOME | End: 2020-02-14
Attending: FAMILY MEDICINE | Admitting: INTERNAL MEDICINE

## 2020-02-12 DIAGNOSIS — Z48.89 ENCOUNTER FOR POSTOPERATIVE CARE: ICD-10-CM

## 2020-02-12 DIAGNOSIS — K61.1 PERIRECTAL ABSCESS: ICD-10-CM

## 2020-02-12 LAB
ANION GAP SERPL CALC-SCNC: 11 MMOL/L (ref 8–16)
B-HCG UR QL: NEGATIVE
BACTERIA #/AREA URNS HPF: ABNORMAL /HPF
BASOPHILS # BLD AUTO: 0.06 K/UL (ref 0–0.2)
BASOPHILS NFR BLD: 0.8 % (ref 0–1.9)
BILIRUB UR QL STRIP: NEGATIVE
BUN SERPL-MCNC: 20 MG/DL (ref 6–20)
CALCIUM SERPL-MCNC: 8.9 MG/DL (ref 8.7–10.5)
CHLORIDE SERPL-SCNC: 94 MMOL/L (ref 95–110)
CLARITY UR: CLEAR
CO2 SERPL-SCNC: 24 MMOL/L (ref 23–29)
COLOR UR: YELLOW
CREAT SERPL-MCNC: 1.3 MG/DL (ref 0.5–1.4)
DIFFERENTIAL METHOD: ABNORMAL
EOSINOPHIL # BLD AUTO: 0.3 K/UL (ref 0–0.5)
EOSINOPHIL NFR BLD: 3.4 % (ref 0–8)
ERYTHROCYTE [DISTWIDTH] IN BLOOD BY AUTOMATED COUNT: 12.2 % (ref 11.5–14.5)
EST. GFR  (AFRICAN AMERICAN): 59.7 ML/MIN/1.73 M^2
EST. GFR  (NON AFRICAN AMERICAN): 51.8 ML/MIN/1.73 M^2
GLUCOSE SERPL-MCNC: 79 MG/DL (ref 70–110)
GLUCOSE UR QL STRIP: NEGATIVE
HCT VFR BLD AUTO: 38.5 % (ref 37–48.5)
HGB BLD-MCNC: 13.7 G/DL (ref 12–16)
HGB UR QL STRIP: ABNORMAL
IMM GRANULOCYTES # BLD AUTO: 0.03 K/UL (ref 0–0.04)
IMM GRANULOCYTES NFR BLD AUTO: 0.4 % (ref 0–0.5)
KETONES UR QL STRIP: NEGATIVE
LEUKOCYTE ESTERASE UR QL STRIP: NEGATIVE
LYMPHOCYTES # BLD AUTO: 2.3 K/UL (ref 1–4.8)
LYMPHOCYTES NFR BLD: 30.1 % (ref 18–48)
MCH RBC QN AUTO: 33.5 PG (ref 27–31)
MCHC RBC AUTO-ENTMCNC: 35.6 G/DL (ref 32–36)
MCV RBC AUTO: 94 FL (ref 82–98)
MICROSCOPIC COMMENT: ABNORMAL
MONOCYTES # BLD AUTO: 0.6 K/UL (ref 0.3–1)
MONOCYTES NFR BLD: 8 % (ref 4–15)
NEUTROPHILS # BLD AUTO: 4.4 K/UL (ref 1.8–7.7)
NEUTROPHILS NFR BLD: 57.3 % (ref 38–73)
NITRITE UR QL STRIP: NEGATIVE
NRBC BLD-RTO: 0 /100 WBC
PH UR STRIP: 6 [PH] (ref 5–8)
PLATELET # BLD AUTO: 373 K/UL (ref 150–350)
PMV BLD AUTO: 9.9 FL (ref 9.2–12.9)
POTASSIUM SERPL-SCNC: 3.4 MMOL/L (ref 3.5–5.1)
PROT UR QL STRIP: NEGATIVE
RBC # BLD AUTO: 4.09 M/UL (ref 4–5.4)
RBC #/AREA URNS HPF: 1 /HPF (ref 0–4)
SODIUM SERPL-SCNC: 129 MMOL/L (ref 136–145)
SP GR UR STRIP: 1.01 (ref 1–1.03)
TROPONIN I SERPL DL<=0.01 NG/ML-MCNC: 0.01 NG/ML (ref 0.02–0.5)
TSH SERPL DL<=0.005 MIU/L-ACNC: 2.67 UIU/ML (ref 0.34–5.6)
URN SPEC COLLECT METH UR: ABNORMAL
UROBILINOGEN UR STRIP-ACNC: NEGATIVE EU/DL
WBC # BLD AUTO: 7.63 K/UL (ref 3.9–12.7)
WBC #/AREA URNS HPF: 1 /HPF (ref 0–5)

## 2020-02-12 PROCEDURE — G0378 HOSPITAL OBSERVATION PER HR: HCPCS

## 2020-02-12 PROCEDURE — 25000003 PHARM REV CODE 250: Performed by: NURSE ANESTHETIST, CERTIFIED REGISTERED

## 2020-02-12 PROCEDURE — 88304 PR  SURG PATH,LEVEL III: ICD-10-PCS | Mod: 26,,, | Performed by: PATHOLOGY

## 2020-02-12 PROCEDURE — 46040 PR I&D PERIRECTAL ABSCESS: ICD-10-PCS | Mod: ,,, | Performed by: SURGERY

## 2020-02-12 PROCEDURE — 81025 URINE PREGNANCY TEST: CPT

## 2020-02-12 PROCEDURE — 36000704 HC OR TIME LEV I 1ST 15 MIN: Performed by: SURGERY

## 2020-02-12 PROCEDURE — 87147 CULTURE TYPE IMMUNOLOGIC: CPT

## 2020-02-12 PROCEDURE — C9290 INJ, BUPIVACAINE LIPOSOME: HCPCS | Performed by: SURGERY

## 2020-02-12 PROCEDURE — 36415 COLL VENOUS BLD VENIPUNCTURE: CPT

## 2020-02-12 PROCEDURE — 84484 ASSAY OF TROPONIN QUANT: CPT

## 2020-02-12 PROCEDURE — 72192 CT PELVIS WITHOUT CONTRAST: ICD-10-PCS | Mod: 26,,, | Performed by: RADIOLOGY

## 2020-02-12 PROCEDURE — 81000 URINALYSIS NONAUTO W/SCOPE: CPT

## 2020-02-12 PROCEDURE — 96365 THER/PROPH/DIAG IV INF INIT: CPT

## 2020-02-12 PROCEDURE — 99285 EMERGENCY DEPT VISIT HI MDM: CPT | Mod: 25

## 2020-02-12 PROCEDURE — 63600175 PHARM REV CODE 636 W HCPCS: Performed by: SURGERY

## 2020-02-12 PROCEDURE — 88305 TISSUE EXAM BY PATHOLOGIST: ICD-10-PCS | Mod: 26,,, | Performed by: PATHOLOGY

## 2020-02-12 PROCEDURE — 37000008 HC ANESTHESIA 1ST 15 MINUTES: Performed by: SURGERY

## 2020-02-12 PROCEDURE — 46040 I&D ISCHIORCT&/PERIRCT ABSC: CPT | Mod: ,,, | Performed by: SURGERY

## 2020-02-12 PROCEDURE — 99220 PR INITIAL OBSERVATION CARE,LEVL III: ICD-10-PCS | Mod: ,,, | Performed by: INTERNAL MEDICINE

## 2020-02-12 PROCEDURE — 87070 CULTURE OTHR SPECIMN AEROBIC: CPT

## 2020-02-12 PROCEDURE — 87075 CULTR BACTERIA EXCEPT BLOOD: CPT

## 2020-02-12 PROCEDURE — 88304 TISSUE EXAM BY PATHOLOGIST: CPT | Mod: 26,,, | Performed by: PATHOLOGY

## 2020-02-12 PROCEDURE — 71000033 HC RECOVERY, INTIAL HOUR: Performed by: SURGERY

## 2020-02-12 PROCEDURE — 80048 BASIC METABOLIC PNL TOTAL CA: CPT

## 2020-02-12 PROCEDURE — 99205 PR OFFICE/OUTPT VISIT, NEW, LEVL V, 60-74 MIN: ICD-10-PCS | Mod: 57,,, | Performed by: SURGERY

## 2020-02-12 PROCEDURE — 72192 CT PELVIS W/O DYE: CPT | Mod: 26,,, | Performed by: RADIOLOGY

## 2020-02-12 PROCEDURE — 63600175 PHARM REV CODE 636 W HCPCS: Performed by: PHYSICIAN ASSISTANT

## 2020-02-12 PROCEDURE — 85025 COMPLETE CBC W/AUTO DIFF WBC: CPT

## 2020-02-12 PROCEDURE — 88305 TISSUE EXAM BY PATHOLOGIST: CPT | Performed by: PATHOLOGY

## 2020-02-12 PROCEDURE — 72192 CT PELVIS W/O DYE: CPT | Mod: TC

## 2020-02-12 PROCEDURE — 25000003 PHARM REV CODE 250: Performed by: SURGERY

## 2020-02-12 PROCEDURE — 99205 OFFICE O/P NEW HI 60 MIN: CPT | Mod: 57,,, | Performed by: SURGERY

## 2020-02-12 PROCEDURE — 88305 TISSUE EXAM BY PATHOLOGIST: CPT | Mod: 26,,, | Performed by: PATHOLOGY

## 2020-02-12 PROCEDURE — 37000009 HC ANESTHESIA EA ADD 15 MINS: Performed by: SURGERY

## 2020-02-12 PROCEDURE — D9220A PRA ANESTHESIA: ICD-10-PCS | Mod: ,,, | Performed by: ANESTHESIOLOGY

## 2020-02-12 PROCEDURE — 84443 ASSAY THYROID STIM HORMONE: CPT

## 2020-02-12 PROCEDURE — 99220 PR INITIAL OBSERVATION CARE,LEVL III: CPT | Mod: ,,, | Performed by: INTERNAL MEDICINE

## 2020-02-12 PROCEDURE — 63600175 PHARM REV CODE 636 W HCPCS: Performed by: NURSE ANESTHETIST, CERTIFIED REGISTERED

## 2020-02-12 PROCEDURE — 36000705 HC OR TIME LEV I EA ADD 15 MIN: Performed by: SURGERY

## 2020-02-12 PROCEDURE — 96375 TX/PRO/DX INJ NEW DRUG ADDON: CPT

## 2020-02-12 PROCEDURE — 88304 TISSUE EXAM BY PATHOLOGIST: CPT | Performed by: PATHOLOGY

## 2020-02-12 PROCEDURE — D9220A PRA ANESTHESIA: Mod: ,,, | Performed by: ANESTHESIOLOGY

## 2020-02-12 RX ORDER — SUCCINYLCHOLINE CHLORIDE 20 MG/ML
INJECTION INTRAMUSCULAR; INTRAVENOUS
Status: DISCONTINUED | OUTPATIENT
Start: 2020-02-12 | End: 2020-02-12

## 2020-02-12 RX ORDER — DIPHENHYDRAMINE HYDROCHLORIDE 50 MG/ML
12.5 INJECTION INTRAMUSCULAR; INTRAVENOUS
Status: DISCONTINUED | OUTPATIENT
Start: 2020-02-12 | End: 2020-02-12

## 2020-02-12 RX ORDER — ONDANSETRON 2 MG/ML
4 INJECTION INTRAMUSCULAR; INTRAVENOUS ONCE
Status: COMPLETED | OUTPATIENT
Start: 2020-02-12 | End: 2020-02-12

## 2020-02-12 RX ORDER — ONDANSETRON 2 MG/ML
4 INJECTION INTRAMUSCULAR; INTRAVENOUS EVERY 4 HOURS PRN
Status: DISCONTINUED | OUTPATIENT
Start: 2020-02-12 | End: 2020-02-14 | Stop reason: HOSPADM

## 2020-02-12 RX ORDER — OXYCODONE HYDROCHLORIDE 5 MG/1
10 TABLET ORAL EVERY 6 HOURS PRN
Status: DISCONTINUED | OUTPATIENT
Start: 2020-02-12 | End: 2020-02-12

## 2020-02-12 RX ORDER — OXYCODONE HYDROCHLORIDE 5 MG/1
20 TABLET ORAL EVERY 4 HOURS PRN
Status: DISCONTINUED | OUTPATIENT
Start: 2020-02-12 | End: 2020-02-14 | Stop reason: HOSPADM

## 2020-02-12 RX ORDER — SODIUM CHLORIDE 0.9 % (FLUSH) 0.9 %
10 SYRINGE (ML) INJECTION
Status: DISCONTINUED | OUTPATIENT
Start: 2020-02-12 | End: 2020-02-14 | Stop reason: HOSPADM

## 2020-02-12 RX ORDER — LIDOCAINE HYDROCHLORIDE 10 MG/ML
1 INJECTION, SOLUTION EPIDURAL; INFILTRATION; INTRACAUDAL; PERINEURAL ONCE
Status: DISCONTINUED | OUTPATIENT
Start: 2020-02-12 | End: 2020-02-12

## 2020-02-12 RX ORDER — MORPHINE SULFATE 4 MG/ML
2 INJECTION, SOLUTION INTRAMUSCULAR; INTRAVENOUS EVERY 4 HOURS PRN
Status: DISCONTINUED | OUTPATIENT
Start: 2020-02-12 | End: 2020-02-14 | Stop reason: HOSPADM

## 2020-02-12 RX ORDER — MORPHINE SULFATE 4 MG/ML
2 INJECTION, SOLUTION INTRAMUSCULAR; INTRAVENOUS EVERY 5 MIN PRN
Status: DISCONTINUED | OUTPATIENT
Start: 2020-02-12 | End: 2020-02-12

## 2020-02-12 RX ORDER — OXYCODONE HYDROCHLORIDE 5 MG/1
10 TABLET ORAL EVERY 4 HOURS PRN
Status: DISCONTINUED | OUTPATIENT
Start: 2020-02-12 | End: 2020-02-14 | Stop reason: HOSPADM

## 2020-02-12 RX ORDER — MEPERIDINE HYDROCHLORIDE 50 MG/ML
INJECTION INTRAMUSCULAR; INTRAVENOUS; SUBCUTANEOUS
Status: DISCONTINUED | OUTPATIENT
Start: 2020-02-12 | End: 2020-02-12

## 2020-02-12 RX ORDER — SODIUM CHLORIDE, SODIUM LACTATE, POTASSIUM CHLORIDE, CALCIUM CHLORIDE 600; 310; 30; 20 MG/100ML; MG/100ML; MG/100ML; MG/100ML
INJECTION, SOLUTION INTRAVENOUS CONTINUOUS
Status: DISCONTINUED | OUTPATIENT
Start: 2020-02-12 | End: 2020-02-14 | Stop reason: HOSPADM

## 2020-02-12 RX ORDER — LISINOPRIL 10 MG/1
20 TABLET ORAL DAILY
Status: DISCONTINUED | OUTPATIENT
Start: 2020-02-13 | End: 2020-02-14 | Stop reason: HOSPADM

## 2020-02-12 RX ORDER — ROCURONIUM BROMIDE 10 MG/ML
INJECTION, SOLUTION INTRAVENOUS
Status: DISCONTINUED | OUTPATIENT
Start: 2020-02-12 | End: 2020-02-12

## 2020-02-12 RX ORDER — ACETAMINOPHEN 325 MG/1
650 TABLET ORAL EVERY 4 HOURS PRN
Status: DISCONTINUED | OUTPATIENT
Start: 2020-02-12 | End: 2020-02-14 | Stop reason: HOSPADM

## 2020-02-12 RX ORDER — FAMOTIDINE 10 MG/ML
20 INJECTION INTRAVENOUS ONCE
Status: DISCONTINUED | OUTPATIENT
Start: 2020-02-12 | End: 2020-02-12

## 2020-02-12 RX ORDER — PROPOFOL 10 MG/ML
VIAL (ML) INTRAVENOUS
Status: DISCONTINUED | OUTPATIENT
Start: 2020-02-12 | End: 2020-02-12

## 2020-02-12 RX ORDER — OXYCODONE AND ACETAMINOPHEN 10; 325 MG/1; MG/1
1 TABLET ORAL EVERY 4 HOURS PRN
Status: DISCONTINUED | OUTPATIENT
Start: 2020-02-12 | End: 2020-02-14 | Stop reason: HOSPADM

## 2020-02-12 RX ORDER — ONDANSETRON 2 MG/ML
4 INJECTION INTRAMUSCULAR; INTRAVENOUS DAILY PRN
Status: DISCONTINUED | OUTPATIENT
Start: 2020-02-12 | End: 2020-02-12

## 2020-02-12 RX ORDER — MORPHINE SULFATE 4 MG/ML
4 INJECTION, SOLUTION INTRAMUSCULAR; INTRAVENOUS ONCE
Status: COMPLETED | OUTPATIENT
Start: 2020-02-12 | End: 2020-02-12

## 2020-02-12 RX ORDER — SODIUM CHLORIDE, SODIUM LACTATE, POTASSIUM CHLORIDE, CALCIUM CHLORIDE 600; 310; 30; 20 MG/100ML; MG/100ML; MG/100ML; MG/100ML
INJECTION, SOLUTION INTRAVENOUS CONTINUOUS
Status: DISCONTINUED | OUTPATIENT
Start: 2020-02-12 | End: 2020-02-12

## 2020-02-12 RX ORDER — MIDAZOLAM HYDROCHLORIDE 1 MG/ML
INJECTION, SOLUTION INTRAMUSCULAR; INTRAVENOUS
Status: DISCONTINUED | OUTPATIENT
Start: 2020-02-12 | End: 2020-02-12

## 2020-02-12 RX ORDER — SODIUM CHLORIDE, SODIUM LACTATE, POTASSIUM CHLORIDE, CALCIUM CHLORIDE 600; 310; 30; 20 MG/100ML; MG/100ML; MG/100ML; MG/100ML
INJECTION, SOLUTION INTRAVENOUS CONTINUOUS PRN
Status: DISCONTINUED | OUTPATIENT
Start: 2020-02-12 | End: 2020-02-12

## 2020-02-12 RX ORDER — MORPHINE SULFATE 4 MG/ML
4 INJECTION, SOLUTION INTRAMUSCULAR; INTRAVENOUS EVERY 4 HOURS PRN
Status: DISCONTINUED | OUTPATIENT
Start: 2020-02-12 | End: 2020-02-14 | Stop reason: HOSPADM

## 2020-02-12 RX ORDER — SODIUM CHLORIDE, SODIUM LACTATE, POTASSIUM CHLORIDE, CALCIUM CHLORIDE 600; 310; 30; 20 MG/100ML; MG/100ML; MG/100ML; MG/100ML
125 INJECTION, SOLUTION INTRAVENOUS CONTINUOUS
Status: DISCONTINUED | OUTPATIENT
Start: 2020-02-12 | End: 2020-02-12

## 2020-02-12 RX ADMIN — SUGAMMADEX 200 MG: 100 INJECTION, SOLUTION INTRAVENOUS at 07:02

## 2020-02-12 RX ADMIN — MEPERIDINE HYDROCHLORIDE 50 MG: 50 INJECTION INTRAMUSCULAR; INTRAVENOUS; SUBCUTANEOUS at 06:02

## 2020-02-12 RX ADMIN — ROCURONIUM BROMIDE 30 MG: 10 INJECTION, SOLUTION INTRAVENOUS at 06:02

## 2020-02-12 RX ADMIN — SODIUM CHLORIDE, SODIUM LACTATE, POTASSIUM CHLORIDE, AND CALCIUM CHLORIDE: .6; .31; .03; .02 INJECTION, SOLUTION INTRAVENOUS at 09:02

## 2020-02-12 RX ADMIN — ERTAPENEM SODIUM 1 G: 1 INJECTION, POWDER, LYOPHILIZED, FOR SOLUTION INTRAMUSCULAR; INTRAVENOUS at 03:02

## 2020-02-12 RX ADMIN — PROPOFOL 200 MG: 10 INJECTION, EMULSION INTRAVENOUS at 06:02

## 2020-02-12 RX ADMIN — SODIUM CHLORIDE, POTASSIUM CHLORIDE, SODIUM LACTATE AND CALCIUM CHLORIDE: 600; 310; 30; 20 INJECTION, SOLUTION INTRAVENOUS at 06:02

## 2020-02-12 RX ADMIN — SUCCINYLCHOLINE CHLORIDE 100 MG: 20 INJECTION, SOLUTION INTRAMUSCULAR; INTRAVENOUS at 06:02

## 2020-02-12 RX ADMIN — MORPHINE SULFATE 4 MG: 4 INJECTION, SOLUTION INTRAMUSCULAR; INTRAVENOUS at 03:02

## 2020-02-12 RX ADMIN — OXYCODONE HYDROCHLORIDE AND ACETAMINOPHEN 1 TABLET: 10; 325 TABLET ORAL at 09:02

## 2020-02-12 RX ADMIN — MIDAZOLAM HYDROCHLORIDE 2 MG: 1 INJECTION, SOLUTION INTRAMUSCULAR; INTRAVENOUS at 06:02

## 2020-02-12 RX ADMIN — ONDANSETRON 4 MG: 2 INJECTION INTRAMUSCULAR; INTRAVENOUS at 03:02

## 2020-02-12 NOTE — H&P
[facility] Hospital Medicine  History & Physical    Patient Name: Kaylene Garcia  MRN: [id number]  Admission Date: 2/12/2020  Attending Physician: Jak Bettencourt MD  Primary Care Provider: Josefina Hartman         Patient information was obtained from patient and ER records.     Subjective:     Principal Problem:Perirectal abscess    Chief Complaint:   Chief Complaint   Patient presents with    Abscess        HPI: Patient is a 39-year-old female that presented to the emergency department complaining of an abscess around her rectum.  Patient states that she has had several year history of intermittent rectal abscesses.  Patient states that she has had several drain in the emergency department but never having had 1 drained inpatient in surgery.  Patient states they will get better and then get worse again and but generally they are always draining.  She denies any fever chills nausea or vomiting but states this is very painful and inconvenient.  Patient has a past medical history of hypertension, hyperlipidemia,, anxiety, and migraine headaches.  Patient was seen in the emergency department and started on antibiotics and was given something for pain which we will continue on admission to medical-surgical unit.  Apparently Dr. Cannon is aware of this patient and will see this patient sometime this afternoon and possibly take to surgery this afternoon reportedly from the emergency department    Past Medical History:   Diagnosis Date    Anxiety     Hyperlipidemia     Hypertension     Migraine headache        History reviewed. No pertinent surgical history.    Review of patient's allergies indicates:   Allergen Reactions    Pcn [penicillins] Anaphylaxis    Iodine and iodide containing products Itching and Rash       No current facility-administered medications on file prior to encounter.      Current Outpatient Medications on File Prior to Encounter   Medication Sig     atorvastatin (LIPITOR) 10 MG tablet Take 10 mg by mouth once daily.    chlorthalidone (HYGROTEN) 25 MG Tab Take 25 mg by mouth once daily.    lisinopril 10 MG tablet Take 20 mg by mouth once daily.      Family History     None        Tobacco Use    Smoking status: Current Every Day Smoker     Packs/day: 1.00     Years: 20.00     Pack years: 20.00     Types: Cigarettes    Smokeless tobacco: Never Used   Substance and Sexual Activity    Alcohol use: Yes     Alcohol/week: 7.0 standard drinks     Types: 7 Glasses of wine per week    Drug use: Yes     Frequency: 3.0 times per week     Types: Marijuana    Sexual activity: Yes     Partners: Male     Birth control/protection: None     Review of Systems   Constitutional: Positive for activity change, appetite change and fatigue. Negative for fever.   HENT: Negative for congestion, ear discharge, mouth sores, nosebleeds, rhinorrhea, sinus pressure, sinus pain and tinnitus.    Eyes: Negative.  Negative for pain, redness and itching.   Respiratory: Negative for apnea, cough, choking, chest tightness, shortness of breath, wheezing and stridor.    Cardiovascular: Negative for chest pain, palpitations and leg swelling.   Gastrointestinal: Negative for abdominal distention, abdominal pain, anal bleeding, blood in stool, constipation and diarrhea.   Endocrine: Negative.    Genitourinary: Positive for pelvic pain. Negative for difficulty urinating, flank pain, frequency and urgency.   Musculoskeletal: Negative for arthralgias, back pain, gait problem and myalgias.   Skin: Negative for color change and pallor.   Allergic/Immunologic: Negative.    Neurological: Positive for weakness. Negative for dizziness, facial asymmetry, light-headedness and headaches.   Hematological: Negative for adenopathy. Does not bruise/bleed easily.   Psychiatric/Behavioral: The patient is hyperactive.      Objective:     Vital Signs (Most Recent):  Temp: 98.2 °F (36.8 °C) (02/12/20 1606)  Pulse: 69  (02/12/20 1606)  Resp: 19 (02/12/20 1606)  BP: 119/63 (02/12/20 1606)  SpO2: 96 % (02/12/20 1606) Vital Signs (24h Range):  Temp:  [98.2 °F (36.8 °C)-98.3 °F (36.8 °C)] 98.2 °F (36.8 °C)  Pulse:  [69-93] 69  Resp:  [16-19] 19  SpO2:  [95 %-96 %] 96 %  BP: (110-119)/(63-68) 119/63     Weight: 75.3 kg (166 lb)  Body mass index is 27.62 kg/m².    Physical Exam   Constitutional: She is oriented to person, place, and time. She appears well-developed and well-nourished.   HENT:   Head: Normocephalic and atraumatic.   Eyes: Pupils are equal, round, and reactive to light. EOM are normal.   Neck: Normal range of motion. Neck supple. No tracheal deviation present. No thyromegaly present.   Cardiovascular: Normal rate, regular rhythm and normal heart sounds.   Pulmonary/Chest: Effort normal and breath sounds normal.   Abdominal: Soft. Bowel sounds are normal. She exhibits no distension. There is tenderness. There is no rebound and no guarding.   Genitourinary: Rectal exam shows guaiac positive stool.   Genitourinary Comments: Tender to palpation in the left perirectal area.  Patient could not tolerate much exam in this area.   Musculoskeletal: Normal range of motion. She exhibits edema, tenderness and deformity.   Lymphadenopathy:     She has no cervical adenopathy.   Neurological: She is alert and oriented to person, place, and time.   Skin: Skin is warm and dry. Capillary refill takes less than 2 seconds.   Psychiatric: She has a normal mood and affect. Her behavior is normal. Judgment and thought content normal.   Nursing note and vitals reviewed.        CRANIAL NERVES     CN III, IV, VI   Pupils are equal, round, and reactive to light.  Extraocular motions are normal.        Significant Labs: All pertinent labs within the past 24 hours have been reviewed.    Significant Imaging: I have reviewed and interpreted all pertinent imaging results/findings within the past 24 hours.    Assessment/Plan:     * Perirectal  abscess  Admit to medical-surgical unit  Begin on Invanz 1 g Q 24 patient has been given the 1st dose in the emergency department.  Repeat labs in the a.m. to include CBC and CMP  Fasting lipid panel in the a.m.  Continue to follow blood pressure and treat as otherwise as needed patient on current score        VTE Risk Mitigation (From admission, onward)         Ordered     Place WOODY hose  Until discontinued      02/12/20 1632     IP VTE LOW RISK PATIENT  Once      02/12/20 1632                   Jak Bettencourt MD  Department of Hospital Medicine   [facility]

## 2020-02-12 NOTE — ASSESSMENT & PLAN NOTE
Admit to medical-surgical unit  Begin on Invanz 1 g Q 24 patient has been given the 1st dose in the emergency department.  Repeat labs in the a.m. to include CBC and CMP  Fasting lipid panel in the a.m.  Continue to follow blood pressure and treat as otherwise as needed patient on current score

## 2020-02-12 NOTE — HPI
Patient is a 39-year-old female that presented to the emergency department complaining of an abscess around her rectum.  Patient states that she has had several year history of intermittent rectal abscesses.  Patient states that she has had several drain in the emergency department but never having had 1 drained inpatient in surgery.  Patient states they will get better and then get worse again and but generally they are always draining.  She denies any fever chills nausea or vomiting but states this is very painful and inconvenient.  Patient has a past medical history of hypertension, hyperlipidemia,, anxiety, and migraine headaches.  Patient was seen in the emergency department and started on antibiotics and was given something for pain which we will continue on admission to medical-surgical unit.  Apparently Dr. Cannon is aware of this patient and will see this patient sometime this afternoon and possibly take to surgery this afternoon reportedly from the emergency department

## 2020-02-12 NOTE — CONSULTS
[facility]  General Surgery  Consult Note  02/12/2020    Patient Name: Kaylene Garcia  MRN: [id number]  Admission Date: 2/12/2020        REASON FOR CONSULT:  Recurrent perirectal abscess    HISTORY:  Ms. Garcia was admitted from the emergency room earlier today.  She presented with a 7 day history of a painful mass to the left of her anus.  She had had an abscess drained at this site less than 1 month ago.  It was drained in the emergency room and she was discharged with antibiotics.  She has had several abscesses drained in this same location over the past several years.  No fever or chills.  No nausea or vomiting.  No diarrhea or constipation.  No melena or hematochezia.  No weight loss.  No prior colonoscopy.  No history of inflammatory bowel disease.  No family history of inflammatory bowel disease.  No other associated symptoms.  No triggering issues.  Pain is aggravated by sitting, palpation of the area, or pressure to the area. Pain is alleviated by lying on her right side, no other alleviating factors.  No other aggravating factors.    PAST MEDICAL HISTORY:  Hypertension.  Hyperlipidemia.  Migraines.  Anxiety disorder.    ALLERGIES:  Penicillin.  Iodine.    HOME MEDICATIONS:  Lipitor.  Chlorthalidone.  Lisinopril.    HOSPITAL MEDICATIONS:  Invanz.  Lisinopril.  Morphine.  Tylenol.  Ondansetron.  Oxycodone.  Trazodone.    SOCIAL HISTORY:  Social consumer of alcohol. Recreational user of marijuana.  Active smoker.  Twenty pack-year tobacco history.  No history of alcohol dependence of withdrawal.  No history of alcohol or drug treatment.    FAMILY HISTORY:  Noncontributory.  No family history of any anesthetic complications, bleeding disorders, inflammatory bowel disease, gastrointestinal malignancies, biliary disease, ulcer disease.    ROS:  As above otherwise all 14 systems entirely negative    Physical Exam:   Gen.-normotensive, normocardic, comfortable, no acute distress, GCS  15.   HEENT-normocephalic, atraumatic, sclerae are anicteric, pupils equal round reactive to light, conjunctiva clear, nares patent without discharge, dentition fair, no oral lesions, oropharynx clear without exudates, mucous membranes moist.   Neck-nontender, full range of motion, no JVD, no bruits, no masses, no thyromegaly.   Cardiovascular-regular rate and rhythm, PMI nondisplaced, tones normal, no gallops, no rubs, no murmurs.  Intact distal pulses throughout.  No peripheral edema.  No bruits or thrills.  Good perfusion.   Pulmonary-clear to auscultation, no respiratory distress, no wheezes, no rales, no rhonchi, good full symmetrical excursion, no accessory muscle use, no retractions.   Abdomen-soft, nontender, nondistended, normoactive normopitched bowel sounds, no masses, no ventral hernias, no inguinal hernias, no bruits, no hepatosplenomegaly, no guarding, no rebound.  Negative heel jolt, psoas, obturator, and Cochran signs.  No percussion, referred, or CVA tenderness.  -normal external genitalia, no lesions, no discharge, no masses.  Rectal-no intraluminal masses, normal sphincter tone, nontender, Hemoccult negative.  4 cm left ischiorectal fossa abscess.  Extremities/Musculoskeletal-no clubbing, no cyanosis, no edema, no gross deformities.  Integument-no rashes, no lesions, no jaundice, no induration, no decubiti.    Lymphatic-no adenopathy - cervical, supraclavicular, axillary, or inguinal.    Psych-mental status intact, no hallucinations, no suicidal ideations, no homicidal ideations, alert, appropriate, oriented to person, time, and place.  Neuro-no gross cranial nerve deficits, no gross motor deficits, no evident sensory deficits, no incoordination, no tremors    LAB RESULTS:  No leukocytosis.  No anemia.  Mild thrombocytosis.  Mild hyponatremia, hypokalemia, hypochloremia.  BUN and creatinine shows evidence of mild CKD stage G3 a with a BUN of 20 mg/dL, creatinine of 1.3 mg/dL, an estimated GFR of  52 mL/min.  TSH indicates she is euthyroid.  Glucose shows no suspicion diabetes.  Pregnancy test negative.  Urinalysis unremarkable.    RADIOLOGY RESULTS:  CT scan of the abdomen pelvis without contrast films and report reviewed.  Study shows evidence of diverticulosis and a left perirectal abscess.    CARDIOVASCULAR STUDIES:  Not applicable    ASSESSMENT:  Recurrent left ischiorectal fossa perirectal abscess.  Cannot exclude anorectal fistula.  Cannot exclude inflammatory bowel disease although clinically no indication of IBD.  No evidence of IBD on imaging study either.    MEDICAL DECISION MAKING:  Surgical drainage of the abscess at this time is warranted.  Intraoperative anoscopy is also warranted.  May ultimately require colonoscopy.  Inpatient treatment is clearly indicated in light of the failure of outpatient therapy.    COUNSELING:  Ms. Garcia was counseled regarding the results of her evaluation thus far, differential diagnosis, and options.  Diagnoses discussed included but were not limited to Crohn's disease, ulcerative colitis, anorectal fistula, incompletely drained abscess, etc.  Options discussed included but were not limited to drainage of the abscess with anoscopy in the operating room tonight, second opinion, transfer to tertiary care center, colorectal surgery consultation, endoscopy, etc.  Questions were solicited and answered.  Entire conversation was held in layman's terms.  Possible complications of surgery discussed included but were not limited to recurrent abscess, incontinence, chronic pain, creation of fistula, need for additional operations or procedures, sepsis, death, etc.  At the conclusion of the conversation she voiced complete understanding of all we discussed, satisfaction all questions were answered, and stated that she felt fully informed completely capable of making an informed decision.  She desires and requests that we proceed with surgical drainage of the abscess  tonight.    PLAN:  Proceed with surgical drainage of the abscess in the operating room tonight with intraoperative anoscopy.    RECOMMENDATIONS:  Continue intravenous antibiotics.  Continue supportive care.  Continue inpatient management.  Repeat laboratory studies tomorrow.  Follow-up cultures when available.  Further recommendations will be dependent upon intraoperative findings, clinical course, etc.    Total face-to-face encounter time was 60 minutes, 40 minutes spent counseling as outlined/summarized above.        Musa Cannon MD  2/12/2020

## 2020-02-12 NOTE — SUBJECTIVE & OBJECTIVE
Past Medical History:   Diagnosis Date    Anxiety     Hyperlipidemia     Hypertension     Migraine headache        History reviewed. No pertinent surgical history.    Review of patient's allergies indicates:   Allergen Reactions    Pcn [penicillins] Anaphylaxis    Iodine and iodide containing products Itching and Rash       No current facility-administered medications on file prior to encounter.      Current Outpatient Medications on File Prior to Encounter   Medication Sig    atorvastatin (LIPITOR) 10 MG tablet Take 10 mg by mouth once daily.    chlorthalidone (HYGROTEN) 25 MG Tab Take 25 mg by mouth once daily.    lisinopril 10 MG tablet Take 20 mg by mouth once daily.      Family History     None        Tobacco Use    Smoking status: Current Every Day Smoker     Packs/day: 1.00     Years: 20.00     Pack years: 20.00     Types: Cigarettes    Smokeless tobacco: Never Used   Substance and Sexual Activity    Alcohol use: Yes     Alcohol/week: 7.0 standard drinks     Types: 7 Glasses of wine per week    Drug use: Yes     Frequency: 3.0 times per week     Types: Marijuana    Sexual activity: Yes     Partners: Male     Birth control/protection: None     Review of Systems   Constitutional: Positive for activity change, appetite change and fatigue. Negative for fever.   HENT: Negative for congestion, ear discharge, mouth sores, nosebleeds, rhinorrhea, sinus pressure, sinus pain and tinnitus.    Eyes: Negative.  Negative for pain, redness and itching.   Respiratory: Negative for apnea, cough, choking, chest tightness, shortness of breath, wheezing and stridor.    Cardiovascular: Negative for chest pain, palpitations and leg swelling.   Gastrointestinal: Negative for abdominal distention, abdominal pain, anal bleeding, blood in stool, constipation and diarrhea.   Endocrine: Negative.    Genitourinary: Positive for pelvic pain. Negative for difficulty urinating, flank pain, frequency and urgency.    Musculoskeletal: Negative for arthralgias, back pain, gait problem and myalgias.   Skin: Negative for color change and pallor.   Allergic/Immunologic: Negative.    Neurological: Positive for weakness. Negative for dizziness, facial asymmetry, light-headedness and headaches.   Hematological: Negative for adenopathy. Does not bruise/bleed easily.   Psychiatric/Behavioral: The patient is hyperactive.      Objective:     Vital Signs (Most Recent):  Temp: 98.2 °F (36.8 °C) (02/12/20 1606)  Pulse: 69 (02/12/20 1606)  Resp: 19 (02/12/20 1606)  BP: 119/63 (02/12/20 1606)  SpO2: 96 % (02/12/20 1606) Vital Signs (24h Range):  Temp:  [98.2 °F (36.8 °C)-98.3 °F (36.8 °C)] 98.2 °F (36.8 °C)  Pulse:  [69-93] 69  Resp:  [16-19] 19  SpO2:  [95 %-96 %] 96 %  BP: (110-119)/(63-68) 119/63     Weight: 75.3 kg (166 lb)  Body mass index is 27.62 kg/m².    Physical Exam   Constitutional: She is oriented to person, place, and time. She appears well-developed and well-nourished.   HENT:   Head: Normocephalic and atraumatic.   Eyes: Pupils are equal, round, and reactive to light. EOM are normal.   Neck: Normal range of motion. Neck supple. No tracheal deviation present. No thyromegaly present.   Cardiovascular: Normal rate, regular rhythm and normal heart sounds.   Pulmonary/Chest: Effort normal and breath sounds normal.   Abdominal: Soft. Bowel sounds are normal. She exhibits no distension. There is tenderness. There is no rebound and no guarding.   Genitourinary: Rectal exam shows guaiac positive stool.   Genitourinary Comments: Tender to palpation in the left perirectal area.  Patient could not tolerate much exam in this area.   Musculoskeletal: Normal range of motion. She exhibits edema, tenderness and deformity.   Lymphadenopathy:     She has no cervical adenopathy.   Neurological: She is alert and oriented to person, place, and time.   Skin: Skin is warm and dry. Capillary refill takes less than 2 seconds.   Psychiatric: She has a  normal mood and affect. Her behavior is normal. Judgment and thought content normal.   Nursing note and vitals reviewed.        CRANIAL NERVES     CN III, IV, VI   Pupils are equal, round, and reactive to light.  Extraocular motions are normal.        Significant Labs: All pertinent labs within the past 24 hours have been reviewed.    Significant Imaging: I have reviewed and interpreted all pertinent imaging results/findings within the past 24 hours.

## 2020-02-12 NOTE — HOSPITAL COURSE
CT of the abdomen pelvis in the emergency department revealed findings suspicious for left perirectal abscess.  Sodium 149 potassium 3.4 chloride 94 BUN creatinine were 21.3 and GFR 59.7 white blood cell count normal at 7.6 hemoglobin 13.7 hematocrit 38.5 platelets will 373 with normal differential.    02/13/2020:  Patient is stable postop day 1 from incision drainage of perirectal abscess.  Labs show no significant abnormalities.  Sodium 130 9 GFR greater than 60.  White blood cell count 9.0 hemoglobin 14 hematocrit 39 MCV 95 platelets 369 otherwise normal differential.  Vital signs have been normal with no fever and blood pressure under control with normal O2 saturations from 94-98% on room air.  Patient is having still some pain but generally feeling better.  Wound repacked on day 1 and patient is otherwise stable.    02/14/2020:  Patient is stable postop day 2.  Trying to have a bowel movement this morning.  Patient will be patient will be shown how to repack the wound and we discharged home with follow-up with Dr. Cannon in 2 weeks.

## 2020-02-12 NOTE — LETTER
February 14, 2020         [address] [address]  Phone: [phone]  Fax: [phone]       Patient: Kaylene Garcia   YOB: 1980  Date of Visit: 02/14/2020    To Whom It May Concern:    Uriel Garcia  was at [facility] from 2/12/2020 to 02/14/2020. SHE may return to work/school on 02/20/2020 with restrictions on lifting. She is restricted on lifting anything weighing more than 5 lbs. If you have any questions or concerns, or if I can be of further assistance, please do not hesitate to contact me.    Sincerely,    Tonya Cornell RN

## 2020-02-13 LAB
ALBUMIN SERPL BCP-MCNC: 4.1 G/DL (ref 3.5–5.2)
ALP SERPL-CCNC: 58 U/L (ref 55–135)
ALT SERPL W/O P-5'-P-CCNC: 18 U/L (ref 10–44)
ANION GAP SERPL CALC-SCNC: 6 MMOL/L (ref 8–16)
AST SERPL-CCNC: 22 U/L (ref 10–40)
BASOPHILS # BLD AUTO: 0.07 K/UL (ref 0–0.2)
BASOPHILS NFR BLD: 0.8 % (ref 0–1.9)
BILIRUB SERPL-MCNC: 0.8 MG/DL (ref 0.1–1)
BUN SERPL-MCNC: 14 MG/DL (ref 6–20)
CALCIUM SERPL-MCNC: 8.3 MG/DL (ref 8.7–10.5)
CHLORIDE SERPL-SCNC: 96 MMOL/L (ref 95–110)
CHOLEST SERPL-MCNC: 151 MG/DL (ref 120–199)
CHOLEST/HDLC SERPL: 1.9 {RATIO} (ref 2–5)
CO2 SERPL-SCNC: 28 MMOL/L (ref 23–29)
CREAT SERPL-MCNC: 1 MG/DL (ref 0.5–1.4)
DIFFERENTIAL METHOD: ABNORMAL
EOSINOPHIL # BLD AUTO: 0.2 K/UL (ref 0–0.5)
EOSINOPHIL NFR BLD: 2.4 % (ref 0–8)
ERYTHROCYTE [DISTWIDTH] IN BLOOD BY AUTOMATED COUNT: 12.3 % (ref 11.5–14.5)
EST. GFR  (AFRICAN AMERICAN): >60 ML/MIN/1.73 M^2
EST. GFR  (NON AFRICAN AMERICAN): >60 ML/MIN/1.73 M^2
GLUCOSE SERPL-MCNC: 88 MG/DL (ref 70–110)
HCT VFR BLD AUTO: 39.9 % (ref 37–48.5)
HDLC SERPL-MCNC: 80 MG/DL (ref 40–75)
HDLC SERPL: 53 % (ref 20–50)
HGB BLD-MCNC: 14 G/DL (ref 12–16)
IMM GRANULOCYTES # BLD AUTO: 0.03 K/UL (ref 0–0.04)
IMM GRANULOCYTES NFR BLD AUTO: 0.3 % (ref 0–0.5)
LDLC SERPL CALC-MCNC: 58.6 MG/DL (ref 63–159)
LYMPHOCYTES # BLD AUTO: 2.4 K/UL (ref 1–4.8)
LYMPHOCYTES NFR BLD: 26.5 % (ref 18–48)
MAGNESIUM SERPL-MCNC: 2 MG/DL (ref 1.6–2.6)
MCH RBC QN AUTO: 33.3 PG (ref 27–31)
MCHC RBC AUTO-ENTMCNC: 35.1 G/DL (ref 32–36)
MCV RBC AUTO: 95 FL (ref 82–98)
MONOCYTES # BLD AUTO: 0.8 K/UL (ref 0.3–1)
MONOCYTES NFR BLD: 8.6 % (ref 4–15)
NEUTROPHILS # BLD AUTO: 5.6 K/UL (ref 1.8–7.7)
NEUTROPHILS NFR BLD: 61.4 % (ref 38–73)
NONHDLC SERPL-MCNC: 71 MG/DL
NRBC BLD-RTO: 0 /100 WBC
PLATELET # BLD AUTO: 369 K/UL (ref 150–350)
PMV BLD AUTO: 10.1 FL (ref 9.2–12.9)
POTASSIUM SERPL-SCNC: 3.9 MMOL/L (ref 3.5–5.1)
PROT SERPL-MCNC: 7.2 G/DL (ref 6–8.4)
RBC # BLD AUTO: 4.2 M/UL (ref 4–5.4)
SODIUM SERPL-SCNC: 130 MMOL/L (ref 136–145)
TRIGL SERPL-MCNC: 62 MG/DL (ref 30–150)
WBC # BLD AUTO: 9.06 K/UL (ref 3.9–12.7)

## 2020-02-13 PROCEDURE — 85025 COMPLETE CBC W/AUTO DIFF WBC: CPT

## 2020-02-13 PROCEDURE — 25000003 PHARM REV CODE 250: Performed by: INTERNAL MEDICINE

## 2020-02-13 PROCEDURE — 25000003 PHARM REV CODE 250: Performed by: SURGERY

## 2020-02-13 PROCEDURE — 36415 COLL VENOUS BLD VENIPUNCTURE: CPT

## 2020-02-13 PROCEDURE — 80053 COMPREHEN METABOLIC PANEL: CPT

## 2020-02-13 PROCEDURE — 99024 POSTOP FOLLOW-UP VISIT: CPT | Mod: ,,, | Performed by: SURGERY

## 2020-02-13 PROCEDURE — 25000003 PHARM REV CODE 250: Performed by: HOSPITALIST

## 2020-02-13 PROCEDURE — 83735 ASSAY OF MAGNESIUM: CPT

## 2020-02-13 PROCEDURE — 80061 LIPID PANEL: CPT

## 2020-02-13 PROCEDURE — G0378 HOSPITAL OBSERVATION PER HR: HCPCS

## 2020-02-13 PROCEDURE — 99024 PR POST-OP FOLLOW-UP VISIT: ICD-10-PCS | Mod: ,,, | Performed by: SURGERY

## 2020-02-13 PROCEDURE — 63600175 PHARM REV CODE 636 W HCPCS: Performed by: SURGERY

## 2020-02-13 PROCEDURE — S4991 NICOTINE PATCH NONLEGEND: HCPCS | Performed by: INTERNAL MEDICINE

## 2020-02-13 RX ORDER — DIPHENHYDRAMINE HCL 25 MG
25 CAPSULE ORAL EVERY 8 HOURS PRN
Status: DISCONTINUED | OUTPATIENT
Start: 2020-02-13 | End: 2020-02-14 | Stop reason: HOSPADM

## 2020-02-13 RX ORDER — IBUPROFEN 200 MG
1 TABLET ORAL DAILY
Status: DISCONTINUED | OUTPATIENT
Start: 2020-02-13 | End: 2020-02-14 | Stop reason: HOSPADM

## 2020-02-13 RX ADMIN — OXYCODONE HYDROCHLORIDE AND ACETAMINOPHEN 1 TABLET: 10; 325 TABLET ORAL at 11:02

## 2020-02-13 RX ADMIN — OXYCODONE 20 MG: 5 TABLET ORAL at 07:02

## 2020-02-13 RX ADMIN — OXYCODONE HYDROCHLORIDE AND ACETAMINOPHEN 1 TABLET: 10; 325 TABLET ORAL at 02:02

## 2020-02-13 RX ADMIN — LISINOPRIL 20 MG: 10 TABLET ORAL at 01:02

## 2020-02-13 RX ADMIN — MORPHINE SULFATE 2 MG: 4 INJECTION, SOLUTION INTRAMUSCULAR; INTRAVENOUS at 01:02

## 2020-02-13 RX ADMIN — ONDANSETRON 4 MG: 2 INJECTION INTRAMUSCULAR; INTRAVENOUS at 07:02

## 2020-02-13 RX ADMIN — ERTAPENEM SODIUM 1 G: 1 INJECTION, POWDER, LYOPHILIZED, FOR SOLUTION INTRAMUSCULAR; INTRAVENOUS at 04:02

## 2020-02-13 RX ADMIN — DIPHENHYDRAMINE HYDROCHLORIDE 25 MG: 25 CAPSULE ORAL at 10:02

## 2020-02-13 RX ADMIN — SODIUM CHLORIDE, SODIUM LACTATE, POTASSIUM CHLORIDE, AND CALCIUM CHLORIDE: .6; .31; .03; .02 INJECTION, SOLUTION INTRAVENOUS at 01:02

## 2020-02-13 RX ADMIN — OXYCODONE HYDROCHLORIDE AND ACETAMINOPHEN 1 TABLET: 10; 325 TABLET ORAL at 06:02

## 2020-02-13 RX ADMIN — NICOTINE 1 PATCH: 21 PATCH, EXTENDED RELEASE TRANSDERMAL at 11:02

## 2020-02-13 NOTE — SUBJECTIVE & OBJECTIVE
Interval History:     Review of Systems   Constitutional: Negative for activity change, appetite change, fatigue and fever.   HENT: Negative for congestion, ear discharge, mouth sores, nosebleeds, rhinorrhea, sinus pressure, sinus pain and tinnitus.    Eyes: Negative.  Negative for pain, redness and itching.   Respiratory: Negative for apnea, cough, choking, chest tightness, shortness of breath, wheezing and stridor.    Cardiovascular: Negative for chest pain, palpitations and leg swelling.   Gastrointestinal: Positive for abdominal pain. Negative for abdominal distention, anal bleeding, blood in stool, constipation and diarrhea.   Endocrine: Negative.    Genitourinary: Negative for difficulty urinating, flank pain, frequency and urgency.   Musculoskeletal: Negative for arthralgias, back pain, gait problem and myalgias.   Skin: Negative for color change and pallor.   Allergic/Immunologic: Negative.    Neurological: Negative for dizziness, facial asymmetry, weakness, light-headedness and headaches.   Hematological: Negative for adenopathy. Does not bruise/bleed easily.     Objective:     Vital Signs (Most Recent):  Temp: 97.8 °F (36.6 °C) (02/13/20 1613)  Pulse: 66 (02/13/20 1613)  Resp: 20 (02/13/20 1613)  BP: 117/64 (02/13/20 1613)  SpO2: 98 % (02/13/20 1613) Vital Signs (24h Range):  Temp:  [96.8 °F (36 °C)-98.4 °F (36.9 °C)] 97.8 °F (36.6 °C)  Pulse:  [55-94] 66  Resp:  [13-20] 20  SpO2:  [94 %-98 %] 98 %  BP: ()/(54-68) 117/64     Weight: 75.3 kg (166 lb)  Body mass index is 27.62 kg/m².    Intake/Output Summary (Last 24 hours) at 2/13/2020 1633  Last data filed at 2/13/2020 1545  Gross per 24 hour   Intake 850 ml   Output 1755 ml   Net -905 ml      Physical Exam   Constitutional: She is oriented to person, place, and time. She appears well-developed and well-nourished.   HENT:   Head: Normocephalic and atraumatic.   Eyes: Pupils are equal, round, and reactive to light. EOM are normal.   Neck: Normal range  of motion. Neck supple. No tracheal deviation present. No thyromegaly present.   Cardiovascular: Normal rate, regular rhythm and normal heart sounds.   Pulmonary/Chest: Effort normal and breath sounds normal.   Abdominal: Soft. Bowel sounds are normal. She exhibits no distension. There is no tenderness. There is no rebound and no guarding.   Genitourinary:   Genitourinary Comments: Rectal pain but patient tolerated repacking well and is otherwise recovering as expected.   Musculoskeletal: Normal range of motion.   Lymphadenopathy:     She has no cervical adenopathy.   Neurological: She is alert and oriented to person, place, and time.   Skin: Skin is warm and dry. Capillary refill takes less than 2 seconds.   Psychiatric: She has a normal mood and affect. Her behavior is normal. Judgment and thought content normal.   Nursing note and vitals reviewed.      Significant Labs:   Recent Lab Results       02/13/20  0708   02/12/20  1733        Albumin 4.1       Alkaline Phosphatase 58       ALT 18       Anion Gap 6       Appearance, UA   Clear     AST 22       Bacteria, UA   Few     Baso # 0.07       Basophil% 0.8       Bilirubin (UA)   Negative     BILIRUBIN TOTAL 0.8  Comment:  For infants and newborns, interpretation of results should be based  on gestational age, weight and in agreement with clinical  observations.  Premature Infant recommended reference ranges:  Up to 24 hours.............<8.0 mg/dL  Up to 48 hours............<12.0 mg/dL  3-5 days..................<15.0 mg/dL  6-29 days.................<15.0 mg/dL         BUN, Bld 14       Calcium 8.3       Chloride 96       Cholesterol 151  Comment:  The National Cholesterol Education Program (NCEP) has set the  following guidelines (reference ranges) for Cholesterol:  Optimal.....................<200 mg/dL  Borderline High.............200-239 mg/dL  High........................> or = 240 mg/dL         CO2 28       Color, UA   Yellow     Creatinine 1.0        Differential Method Automated       eGFR if  >60.0       eGFR if non  >60.0  Comment:  Calculation used to obtain the estimated glomerular filtration  rate (eGFR) is the CKD-EPI equation.          Eos # 0.2       Eosinophil% 2.4       Glucose 88       Glucose, UA   Negative     Gran # (ANC) 5.6       Gran% 61.4       HDL 80  Comment:  The National Cholesterol Education Program (NCEP) has set the  following guidelines (reference values) for HDL Cholesterol:  Low...............<40 mg/dL  Optimal...........>60 mg/dL         Hdl/Cholesterol Ratio 53.0       Hematocrit 39.9       Hemoglobin 14.0       Immature Grans (Abs) 0.03  Comment:  Mild elevation in immature granulocytes is non specific and   can be seen in a variety of conditions including stress response,   acute inflammation, trauma and pregnancy. Correlation with other   laboratory and clinical findings is essential.         Immature Granulocytes 0.3       Ketones, UA   Negative     LDL Cholesterol External 58.6  Comment:  The National Cholesterol Education Program (NCEP) has set the  following guidelines (reference values) for LDL Cholesterol:  Optimal.......................<130 mg/dL  Borderline High...............130-159 mg/dL  High..........................160-189 mg/dL  Very High.....................>190 mg/dL         Leukocytes, UA   Negative     Lymph # 2.4       Lymph% 26.5       Magnesium 2.0       MCH 33.3       MCHC 35.1       MCV 95       Microscopic Comment   SEE COMMENT  Comment:  Other formed elements not mentioned in the report are not   present in the microscopic examination.        Mono # 0.8       Mono% 8.6       MPV 10.1       NITRITE UA   Negative     Non-HDL Cholesterol 71  Comment:  Risk category and Non-HDL cholesterol goals:  Coronary heart disease (CHD)or equivalent (10-year risk of CHD >20%):  Non-HDL cholesterol goal     <130 mg/dL  Two or more CHD risk factors and 10-year risk of CHD <= 20%:  Non-HDL  cholesterol goal     <160 mg/dL  0 to 1 CHD risk factor:  Non-HDL cholesterol goal     <190 mg/dL         nRBC 0       Occult Blood UA   1+     pH, UA   6.0     Platelets 369       Potassium 3.9       PROTEIN TOTAL 7.2       Protein, UA   Negative  Comment:  Recommend a 24 hour urine protein or a urine   protein/creatinine ratio if globulin induced proteinuria is  clinically suspected.       RBC 4.20       RBC, UA   1     RDW 12.3       Sodium 130       Specific Gravity, UA   1.010     Specimen UA   Urine, Clean Catch     Total Cholesterol/HDL Ratio 1.9       Triglycerides 62  Comment:  The National Cholesterol Education Program (NCEP) has set the  following guidelines (reference values) for triglycerides:  Normal......................<150 mg/dL  Borderline High.............150-199 mg/dL  High........................200-499 mg/dL         UROBILINOGEN UA   Negative     WBC, UA   1     WBC 9.06           All pertinent labs within the past 24 hours have been reviewed.    Significant Imaging: I have reviewed and interpreted all pertinent imaging results/findings within the past 24 hours.

## 2020-02-13 NOTE — PLAN OF CARE
02/13/20 0940   Discharge Assessment   Assessment Type Discharge Planning Assessment   Confirmed/corrected address and phone number on facesheet? Yes   Assessment information obtained from? Patient   Expected Length of Stay (days) 2   Communicated expected length of stay with patient/caregiver yes   Prior to hospitilization cognitive status: Alert/Oriented   Prior to hospitalization functional status: Independent   Current cognitive status: Alert/Oriented   Current Functional Status: Needs Assistance   Lives With spouse   Able to Return to Prior Arrangements yes   Is patient able to care for self after discharge? Yes   Who are your caregiver(s) and their phone number(s)? Lobo Garcia spouse [phone]   Patient's perception of discharge disposition home or selfcare   Readmission Within the Last 30 Days no previous admission in last 30 days   Patient currently being followed by outpatient case management? No   Patient currently receives any other outside agency services? No   Equipment Currently Used at Home none   Do you have any problems affording any of your prescribed medications? No   Is the patient taking medications as prescribed? yes   Does the patient have transportation home? Yes   Transportation Anticipated family or friend will provide   Does the patient receive services at the [facility]? No   Discharge Plan A Home with family   DME Needed Upon Discharge  none   Patient/Family in Agreement with Plan yes   Patient lives at home with her  who is at bedside. She does not work at this time & has no insurance. She uses [employer] with [facility] who has her on $4 meds from [facility]. May need assistance with prescriptions when discharged. Will provide voucher if needs help. Her spouse states he may or may not be able to do the dressing changes when discharged. He will watch the nurse/doctor when they change it & decide if he would be able to do it. If she can't then she will need to  come in everyday to [facility] to have it done as has no insurance at this time. Denies any other needs at this time.

## 2020-02-13 NOTE — PLAN OF CARE
Patient came into recovery awake and alert. VSS. Wound open and packed. Not complaining of pain. Asked for sprite. Tolerating it well.

## 2020-02-13 NOTE — OP NOTE
[facility] - Med Surg  General Surgery  Op Note  02/12/2020      Patient Name: Kaylene Garcia  MRN: [id number]         SURGEON:  Musa Cannon M.D.     ASSISTANT: None     PREOPERATIVE DIAGNOSIS:  Recurrent perirectal abscess     POSTOPERATIVE DIAGNOSIS:  Same     SURGICAL PROCEDURE PERFORMED:  Incision and drainage of perirectal abscess.  Anoscopy.    ANESTHESIA:  General.     INDICATIONS FOR PROCEDURE:  Recurrent left ischiorectal fossa abscess.    SURGICAL FINDINGS:  Chronic abscess with a cavity lined by granulation tissue.  No evidence of an anorectal fistula.  No evidence of IBD on anoscopy.     PROCEDURE IN DETAIL:  In preop holding, Kaylene Garcia was identified by name, wrist band, and birth date.  She confirmed identity.  Informed consent process reviewed. She verbalized consent as well as understanding of all treatment options, risks, benefits, details of, and indications for each option.  Operative site was marked.  She confirmed the correct site was identified and marked.  Intravenous antibiotics administered.  Transported to operating room. Time-out completed. Transferred to OR bed. Anesthesia induced without difficulty or complications. Operative field prepped and draped in the usual sterile fashion with ChloraPrep, sterile towels, and sterile drapes.  ChloraPrep was permitted to dry for 3 min before placing towels and drapes.  20 cc of Exparel infiltrated into the operative field during the case for postoperative analgesia.  Cruciate incision made overlying abscess with a number 15 blade. Abscess was entered.  Purulent exudate encountered, cultured, and evacuated with suction. Incision extended to reached the limits of the cavity in all directions.  Granulation tissue excised with electrocautery and delivered from the field. Anoscopy completed with no abnormalities detected.  No evidence of fistula encountered.  Skin lesion posterior to the wound was shaved biopsy with a number  15 blade. Hemostasis ensured. Abscess wound packed with saline soaked gauze.  Emerged from anesthetic without difficulty. Transported to recovery room in good condition.    TOLERATED: Well    COUNTS:  Correct      DRAINS: None     ESTIMATED BLOOD LOSS:  Less than 5 cc     SPECIMEN:  Cultures.  Skin lesion.  Abscess granulation tissue.     COMPLICATIONS: None     DISPOSITION:  To PACU, stable.      Documented with M*Modal voice recognition software.      Musa Cannon MD FACS

## 2020-02-13 NOTE — PLAN OF CARE
Problem: Adult Inpatient Plan of Care  Goal: Plan of Care Review  2/13/2020 0352 by Hannah Gonsales, LUIS  Outcome: Ongoing, Progressing  2/13/2020 0027 by Hannah Gonsales RN  Outcome: Ongoing, Progressing     Problem: Adult Inpatient Plan of Care  Goal: Patient-Specific Goal (Individualization)  2/13/2020 0352 by Hannah Gonsales, RN  Outcome: Ongoing, Progressing  2/13/2020 0027 by Hannah Gonsales RN  Outcome: Ongoing, Progressing     Problem: Infection  Goal: Infection Symptom Resolution  Outcome: Ongoing, Progressing   Mrs Garcia pain is managed with Po medication. Ambulates freely to bathroom. Voiding clear yellow urine. Eating and taking in fluids.

## 2020-02-13 NOTE — PLAN OF CARE
Problem: Adult Inpatient Plan of Care  Goal: Plan of Care Review  Outcome: Ongoing, Progressing     Problem: Adult Inpatient Plan of Care  Goal: Patient-Specific Goal (Individualization)  Outcome: Ongoing, Progressing

## 2020-02-13 NOTE — ASSESSMENT & PLAN NOTE
Admit to medical-surgical unit  Begin on Invanz 1 g Q 24 patient has been given the 1st dose in the emergency department.  Repeat labs in the a.m. to include CBC and CMP  Fasting lipid panel in the a.m.  Continue to follow blood pressure and treat as otherwise as needed patient on current score    02/13/2020:  Continue current antibiotics  Await Dr. Cannon to see patient for discharge plan.  Patient is otherwise stable postop day 1 from perirectal abscess drainage

## 2020-02-13 NOTE — PLAN OF CARE
Problem: Adult Inpatient Plan of Care  Goal: Plan of Care Review  Outcome: Ongoing, Progressing     Problem: Infection  Goal: Infection Symptom Resolution  Outcome: Ongoing, Progressing   PATIENT IMPROVING THROUGHOUT SHIFT. PAIN IMPROVING THROUGHOUT SHIFT. PACKING CHANGED, SEE NOTE. PATIENT TOLERATED WELL. DRESSING DRY AND INTACT AT THIS TIME. WILL CONTINUE TO MONITOR PATIENT.

## 2020-02-13 NOTE — PROGRESS NOTES
[facility] Hospital Medicine  Progress Note    Patient Name: Kaylene Garcia  MRN: [id number]  Patient Class: OP- Observation   Admission Date: 2/12/2020  Length of Stay: 0 days  Attending Physician: Jak Bettencourt MD  Primary Care Provider: Josefina Hartman        Subjective:     Principal Problem:Perirectal abscess        HPI:  Patient is a 39-year-old female that presented to the emergency department complaining of an abscess around her rectum.  Patient states that she has had several year history of intermittent rectal abscesses.  Patient states that she has had several drain in the emergency department but never having had 1 drained inpatient in surgery.  Patient states they will get better and then get worse again and but generally they are always draining.  She denies any fever chills nausea or vomiting but states this is very painful and inconvenient.  Patient has a past medical history of hypertension, hyperlipidemia,, anxiety, and migraine headaches.  Patient was seen in the emergency department and started on antibiotics and was given something for pain which we will continue on admission to medical-surgical unit.  Apparently Dr. Cannon is aware of this patient and will see this patient sometime this afternoon and possibly take to surgery this afternoon reportedly from the emergency department    Overview/Hospital Course:  CT of the abdomen pelvis in the emergency department revealed findings suspicious for left perirectal abscess.  Sodium 149 potassium 3.4 chloride 94 BUN creatinine were 21.3 and GFR 59.7 white blood cell count normal at 7.6 hemoglobin 13.7 hematocrit 38.5 platelets will 373 with normal differential.    02/13/2020:  Patient is stable postop day 1 from incision drainage of perirectal abscess.  Labs show no significant abnormalities.  Sodium 130 9 GFR greater than 60.  White blood cell count 9.0 hemoglobin 14 hematocrit 39 MCV 95 platelets 369 otherwise  normal differential.  Vital signs have been normal with no fever and blood pressure under control with normal O2 saturations from 94-98% on room air.  Patient is having still some pain but generally feeling better.  Wound repacked on day 1 and patient is otherwise stable.    Interval History:     Review of Systems   Constitutional: Negative for activity change, appetite change, fatigue and fever.   HENT: Negative for congestion, ear discharge, mouth sores, nosebleeds, rhinorrhea, sinus pressure, sinus pain and tinnitus.    Eyes: Negative.  Negative for pain, redness and itching.   Respiratory: Negative for apnea, cough, choking, chest tightness, shortness of breath, wheezing and stridor.    Cardiovascular: Negative for chest pain, palpitations and leg swelling.   Gastrointestinal: Positive for abdominal pain. Negative for abdominal distention, anal bleeding, blood in stool, constipation and diarrhea.   Endocrine: Negative.    Genitourinary: Negative for difficulty urinating, flank pain, frequency and urgency.   Musculoskeletal: Negative for arthralgias, back pain, gait problem and myalgias.   Skin: Negative for color change and pallor.   Allergic/Immunologic: Negative.    Neurological: Negative for dizziness, facial asymmetry, weakness, light-headedness and headaches.   Hematological: Negative for adenopathy. Does not bruise/bleed easily.     Objective:     Vital Signs (Most Recent):  Temp: 97.8 °F (36.6 °C) (02/13/20 1613)  Pulse: 66 (02/13/20 1613)  Resp: 20 (02/13/20 1613)  BP: 117/64 (02/13/20 1613)  SpO2: 98 % (02/13/20 1613) Vital Signs (24h Range):  Temp:  [96.8 °F (36 °C)-98.4 °F (36.9 °C)] 97.8 °F (36.6 °C)  Pulse:  [55-94] 66  Resp:  [13-20] 20  SpO2:  [94 %-98 %] 98 %  BP: ()/(54-68) 117/64     Weight: 75.3 kg (166 lb)  Body mass index is 27.62 kg/m².    Intake/Output Summary (Last 24 hours) at 2/13/2020 1633  Last data filed at 2/13/2020 1545  Gross per 24 hour   Intake 850 ml   Output 1755 ml    Net -905 ml      Physical Exam   Constitutional: She is oriented to person, place, and time. She appears well-developed and well-nourished.   HENT:   Head: Normocephalic and atraumatic.   Eyes: Pupils are equal, round, and reactive to light. EOM are normal.   Neck: Normal range of motion. Neck supple. No tracheal deviation present. No thyromegaly present.   Cardiovascular: Normal rate, regular rhythm and normal heart sounds.   Pulmonary/Chest: Effort normal and breath sounds normal.   Abdominal: Soft. Bowel sounds are normal. She exhibits no distension. There is no tenderness. There is no rebound and no guarding.   Genitourinary:   Genitourinary Comments: Rectal pain but patient tolerated repacking well and is otherwise recovering as expected.   Musculoskeletal: Normal range of motion.   Lymphadenopathy:     She has no cervical adenopathy.   Neurological: She is alert and oriented to person, place, and time.   Skin: Skin is warm and dry. Capillary refill takes less than 2 seconds.   Psychiatric: She has a normal mood and affect. Her behavior is normal. Judgment and thought content normal.   Nursing note and vitals reviewed.      Significant Labs:   Recent Lab Results       02/13/20  0708   02/12/20  1733        Albumin 4.1       Alkaline Phosphatase 58       ALT 18       Anion Gap 6       Appearance, UA   Clear     AST 22       Bacteria, UA   Few     Baso # 0.07       Basophil% 0.8       Bilirubin (UA)   Negative     BILIRUBIN TOTAL 0.8  Comment:  For infants and newborns, interpretation of results should be based  on gestational age, weight and in agreement with clinical  observations.  Premature Infant recommended reference ranges:  Up to 24 hours.............<8.0 mg/dL  Up to 48 hours............<12.0 mg/dL  3-5 days..................<15.0 mg/dL  6-29 days.................<15.0 mg/dL         BUN, Bld 14       Calcium 8.3       Chloride 96       Cholesterol 151  Comment:  The National Cholesterol Education  Program (NCEP) has set the  following guidelines (reference ranges) for Cholesterol:  Optimal.....................<200 mg/dL  Borderline High.............200-239 mg/dL  High........................> or = 240 mg/dL         CO2 28       Color, UA   Yellow     Creatinine 1.0       Differential Method Automated       eGFR if  >60.0       eGFR if non  >60.0  Comment:  Calculation used to obtain the estimated glomerular filtration  rate (eGFR) is the CKD-EPI equation.          Eos # 0.2       Eosinophil% 2.4       Glucose 88       Glucose, UA   Negative     Gran # (ANC) 5.6       Gran% 61.4       HDL 80  Comment:  The National Cholesterol Education Program (NCEP) has set the  following guidelines (reference values) for HDL Cholesterol:  Low...............<40 mg/dL  Optimal...........>60 mg/dL         Hdl/Cholesterol Ratio 53.0       Hematocrit 39.9       Hemoglobin 14.0       Immature Grans (Abs) 0.03  Comment:  Mild elevation in immature granulocytes is non specific and   can be seen in a variety of conditions including stress response,   acute inflammation, trauma and pregnancy. Correlation with other   laboratory and clinical findings is essential.         Immature Granulocytes 0.3       Ketones, UA   Negative     LDL Cholesterol External 58.6  Comment:  The National Cholesterol Education Program (NCEP) has set the  following guidelines (reference values) for LDL Cholesterol:  Optimal.......................<130 mg/dL  Borderline High...............130-159 mg/dL  High..........................160-189 mg/dL  Very High.....................>190 mg/dL         Leukocytes, UA   Negative     Lymph # 2.4       Lymph% 26.5       Magnesium 2.0       MCH 33.3       MCHC 35.1       MCV 95       Microscopic Comment   SEE COMMENT  Comment:  Other formed elements not mentioned in the report are not   present in the microscopic examination.        Mono # 0.8       Mono% 8.6       MPV 10.1       NITRITE  UA   Negative     Non-HDL Cholesterol 71  Comment:  Risk category and Non-HDL cholesterol goals:  Coronary heart disease (CHD)or equivalent (10-year risk of CHD >20%):  Non-HDL cholesterol goal     <130 mg/dL  Two or more CHD risk factors and 10-year risk of CHD <= 20%:  Non-HDL cholesterol goal     <160 mg/dL  0 to 1 CHD risk factor:  Non-HDL cholesterol goal     <190 mg/dL         nRBC 0       Occult Blood UA   1+     pH, UA   6.0     Platelets 369       Potassium 3.9       PROTEIN TOTAL 7.2       Protein, UA   Negative  Comment:  Recommend a 24 hour urine protein or a urine   protein/creatinine ratio if globulin induced proteinuria is  clinically suspected.       RBC 4.20       RBC, UA   1     RDW 12.3       Sodium 130       Specific Gravity, UA   1.010     Specimen UA   Urine, Clean Catch     Total Cholesterol/HDL Ratio 1.9       Triglycerides 62  Comment:  The National Cholesterol Education Program (NCEP) has set the  following guidelines (reference values) for triglycerides:  Normal......................<150 mg/dL  Borderline High.............150-199 mg/dL  High........................200-499 mg/dL         UROBILINOGEN UA   Negative     WBC, UA   1     WBC 9.06           All pertinent labs within the past 24 hours have been reviewed.    Significant Imaging: I have reviewed and interpreted all pertinent imaging results/findings within the past 24 hours.      Assessment/Plan:      * Perirectal abscess  Admit to medical-surgical unit  Begin on Invanz 1 g Q 24 patient has been given the 1st dose in the emergency department.  Repeat labs in the a.m. to include CBC and CMP  Fasting lipid panel in the a.m.  Continue to follow blood pressure and treat as otherwise as needed patient on current score    02/13/2020:  Continue current antibiotics  Await Dr. Cannon to see patient for discharge plan.  Patient is otherwise stable postop day 1 from perirectal abscess drainage        VTE Risk Mitigation (From admission,  onward)         Ordered     Place WOODY hose  Until discontinued      02/12/20 1632     IP VTE LOW RISK PATIENT  Once      02/12/20 1632                      Jak Bettencourt MD  Department of Hospital Medicine   [facility]

## 2020-02-13 NOTE — ANESTHESIA POSTPROCEDURE EVALUATION
Anesthesia Post Evaluation    Patient: Kaylene Garcia    Procedure(s) Performed: Procedure(s) (LRB):  INCISION, ABSCESS, PERIRECTAL (Left)    Final Anesthesia Type: general    Patient location during evaluation: PACU  Patient participation: Yes- Able to Participate  Level of consciousness: awake and alert  Post-procedure vital signs: reviewed and stable  Pain management: adequate  Airway patency: patent    PONV status at discharge: No PONV  Anesthetic complications: no      Cardiovascular status: blood pressure returned to baseline  Respiratory status: unassisted  Hydration status: euvolemic  Follow-up not needed.          Vitals Value Taken Time   /57 2/12/2020  7:31 PM   Temp 36.6 °C (97.9 °F) 2/12/2020  7:20 PM   Pulse 71 2/12/2020  7:33 PM   Resp 16 2/12/2020  7:33 PM   SpO2 91 % 2/12/2020  7:33 PM   Vitals shown include unvalidated device data.      No case tracking events are documented in the log.      Pain/Gisell Score: Pain Rating Prior to Med Admin: 8 (2/12/2020  3:14 PM)  Gisell Score: 10 (2/12/2020  7:25 PM)

## 2020-02-13 NOTE — NURSING
DRESSING REMOVED AT THIS TIME. SMALL AMOUNT OF DRAINAGE NOTED. PACKING SOAKED WITH SALINE BEFORE REMOVED. WOUND FLUSHED WITH 60CC NS. PT TOLERATED WELL. INCISION PACKED WITH SALINE SOAKED SPONGE GAUZE. 3/4 OF THE GUAZE INSERTED. GAUZE DRESSING PLACED OVER PACKING AND SECURED WITH SURGICAL TAPE. PT TOLERATED WELL. WILL CONTINUE TO MONITOR PATIENT

## 2020-02-13 NOTE — ANESTHESIA PREPROCEDURE EVALUATION
02/12/2020  Kaylene Garcia is a 39 y.o., female.    Anesthesia Evaluation    I have reviewed the Patient Summary Reports.    I have reviewed the Nursing Notes.   I have reviewed the Medications.     Review of Systems  Social:  Smoker    Hematology/Oncology:  Hematology Normal   Oncology Normal     EENT/Dental:EENT/Dental Normal   Cardiovascular:   Hypertension hyperlipidemia    Renal/:  Renal/ Normal     Hepatic/GI:  Hepatic/GI Normal    Musculoskeletal:  Musculoskeletal Normal    Neurological:   Headaches    Dermatological:  Skin Normal    Psych:  Psychiatric Normal           Physical Exam  General:  Well nourished    Airway/Jaw/Neck:  Airway Findings: Mouth Opening: Normal Tongue: Normal  Mallampati: II  TM Distance: Normal, at least 6 cm       Chest/Lungs:  Chest/Lungs Findings: Clear to auscultation     Heart/Vascular:  Heart Findings: Rate: Normal        Mental Status:  Mental Status Findings:  Cooperative, Alert and Oriented         Anesthesia Plan  Type of Anesthesia, risks & benefits discussed:  Anesthesia Type:  general  Patient's Preference:   Intra-op Monitoring Plan: standard ASA monitors  Intra-op Monitoring Plan Comments:   Post Op Pain Control Plan: IV/PO Opioids PRN  Post Op Pain Control Plan Comments:   Induction:   IV  Beta Blocker:  Patient is not currently on a Beta-Blocker (No further documentation required).       Informed Consent: Patient understands risks and agrees with Anesthesia plan.  Questions answered. Anesthesia consent signed with patient.  ASA Score: 2  emergent   Day of Surgery Review of History & Physical: I have interviewed and examined the patient. I have reviewed the patient's H&P dated:            Ready For Surgery From Anesthesia Perspective.

## 2020-02-13 NOTE — PROGRESS NOTES
[facility]  General Surgery  Progress Note  02/13/2020    Patient Name: Kaylene Garcia  MRN: [id number]  Admission Date: 2/12/2020  Hospital Day: 2    POD #:  2  Antibiotics:  Invanz - Day 2      Interval History:  No new complaints.  Pain controlled.  Tolerating diet.  Mobility good.  Tolerated packing change.  No nausea or vomiting.    Vitals:  Afebrile.  Good vital signs.  Good room air oxygen saturations.    I/O:  Balanced  UOP:  Good      Exam:   Gen - Comfortable.  Nontoxic.  No acute distress.  CV - Regular rate and rhythm.  Good perfusion.  No edema.  Pulm - Clear to auscultation.  Nonlabored.   Abd - Soft.  Nondistended.  Expected tenderness only.  Normoactive normopitched bowel sounds.   Integument - No rashes.  No decubiti.  No jaundice.  Wound clean, healing well without erythema, edema, exudate, induration, fluctuance, crepitus, necrosis, discoloration, separation, etc.  Ext - No edema.  No cords.  No tenderness.      Lab Results:  No leukocytosis.  No anemia.  Mild thrombocytosis.  Mild hyponatremia, improved.  Mild hypocalcemia.  BUN and creatinine good.  Euglycemic.  No evidence of hepatocellular disease or biliary obstruction.  Cultures pending.    Radiology Results:  No new radiology results    Pathology:  Pending    Assessment:  Satisfactory postoperative recovery.  No evident complications.      Counseling/Medical Decision Making:  May require outpatient colonoscopy.    Recommendations:  Continue wound care.  Transition to oral antibiotics.  Consider discharge tomorrow.    Plan:  No additional plans at this time.  Will follow while hospitalized.  Postop follow-up in surgery clinic 2 weeks after discharge.        Musa Cannon MD FACS

## 2020-02-13 NOTE — CLINICAL REVIEW
Contributing Nutrition Diagnosis  Unintentional weight losss    Related to (etiology):   Working, stress, etc.      Signs and Symptoms (as evidenced by):   MST, medical record, and pt stating she was 210 lb over a year ago      Interventions/Recommendations (treatment strategy):  Per pt, she has lost 40 lbs over last year. It was unintentional. She denies  N/V/D/C currently and in past year. Patient is very picky eater. Upon her request, RD changed diet to vegetarian. Diet techs informed to give patient cheese in place of protein (again, per patient request)     Based on BMI, patient is actually still overweight. Although unintentional, weight loss of 40 lbs over 12 months is possible and healthy (averaging 3.5 lbs per month)     At this time, there is no nutritional need for a full assessment, intervention, or recommendation.     Nutrition Diagnosis Status:   Resolved

## 2020-02-13 NOTE — TRANSFER OF CARE
"Anesthesia Transfer of Care Note    Patient: Kaylene Garcia    Procedure(s) Performed: Procedure(s) (LRB):  INCISION, ABSCESS, PERIRECTAL (Left)    Patient location: PACU    Anesthesia Type: general    Transport from OR: Transported from OR on room air with adequate spontaneous ventilation    Post pain: adequate analgesia    Post assessment: no apparent anesthetic complications and tolerated procedure well    Post vital signs: stable    Level of consciousness: awake, alert and oriented    Nausea/Vomiting: no nausea/vomiting    Complications: none    Transfer of care protocol was followed      Last vitals:   Visit Vitals  /63 (BP Location: Right arm, Patient Position: Lying)   Pulse 69   Temp 36.8 °C (98.2 °F) (Oral)   Resp 19   Ht 5' 5" (1.651 m)   Wt 75.3 kg (166 lb)   LMP 02/12/2020   SpO2 96%   Breastfeeding? No   BMI 27.62 kg/m²     "

## 2020-02-14 VITALS
SYSTOLIC BLOOD PRESSURE: 112 MMHG | DIASTOLIC BLOOD PRESSURE: 62 MMHG | BODY MASS INDEX: 27.66 KG/M2 | HEIGHT: 65 IN | RESPIRATION RATE: 18 BRPM | OXYGEN SATURATION: 99 % | HEART RATE: 67 BPM | WEIGHT: 166 LBS | TEMPERATURE: 98 F

## 2020-02-14 LAB — MAGNESIUM SERPL-MCNC: 1.9 MG/DL (ref 1.6–2.6)

## 2020-02-14 PROCEDURE — 99217 PR OBSERVATION CARE DISCHARGE: CPT | Mod: ,,, | Performed by: INTERNAL MEDICINE

## 2020-02-14 PROCEDURE — 63600175 PHARM REV CODE 636 W HCPCS: Performed by: SURGERY

## 2020-02-14 PROCEDURE — 99217 PR OBSERVATION CARE DISCHARGE: ICD-10-PCS | Mod: ,,, | Performed by: INTERNAL MEDICINE

## 2020-02-14 PROCEDURE — 83735 ASSAY OF MAGNESIUM: CPT

## 2020-02-14 PROCEDURE — 25000003 PHARM REV CODE 250: Performed by: HOSPITALIST

## 2020-02-14 PROCEDURE — 25000003 PHARM REV CODE 250: Performed by: SURGERY

## 2020-02-14 PROCEDURE — G0378 HOSPITAL OBSERVATION PER HR: HCPCS

## 2020-02-14 PROCEDURE — 36415 COLL VENOUS BLD VENIPUNCTURE: CPT

## 2020-02-14 RX ORDER — LEVOFLOXACIN 500 MG/1
500 TABLET, FILM COATED ORAL DAILY
Qty: 10 TABLET | Refills: 0 | Status: SHIPPED | OUTPATIENT
Start: 2020-02-14 | End: 2021-07-07 | Stop reason: CLARIF

## 2020-02-14 RX ORDER — OXYCODONE AND ACETAMINOPHEN 10; 325 MG/1; MG/1
1 TABLET ORAL EVERY 6 HOURS PRN
Qty: 20 TABLET | Refills: 0 | Status: SHIPPED | OUTPATIENT
Start: 2020-02-14 | End: 2021-07-07 | Stop reason: CLARIF

## 2020-02-14 RX ORDER — TRAZODONE HYDROCHLORIDE 50 MG/1
25 TABLET ORAL NIGHTLY PRN
Qty: 30 TABLET | Refills: 2 | Status: SHIPPED | OUTPATIENT
Start: 2020-02-14 | End: 2020-02-14

## 2020-02-14 RX ORDER — OXYCODONE AND ACETAMINOPHEN 10; 325 MG/1; MG/1
1 TABLET ORAL EVERY 4 HOURS PRN
Qty: 30 TABLET | Refills: 0 | Status: SHIPPED | OUTPATIENT
Start: 2020-02-14 | End: 2020-02-14

## 2020-02-14 RX ORDER — TRAZODONE HYDROCHLORIDE 50 MG/1
25 TABLET ORAL NIGHTLY PRN
Qty: 30 TABLET | Refills: 2 | Status: ON HOLD | OUTPATIENT
Start: 2020-02-14 | End: 2021-07-14

## 2020-02-14 RX ADMIN — MORPHINE SULFATE 2 MG: 4 INJECTION, SOLUTION INTRAMUSCULAR; INTRAVENOUS at 10:02

## 2020-02-14 RX ADMIN — DIPHENHYDRAMINE HYDROCHLORIDE 25 MG: 25 CAPSULE ORAL at 07:02

## 2020-02-14 RX ADMIN — OXYCODONE HYDROCHLORIDE AND ACETAMINOPHEN 1 TABLET: 10; 325 TABLET ORAL at 11:02

## 2020-02-14 RX ADMIN — OXYCODONE HYDROCHLORIDE AND ACETAMINOPHEN 1 TABLET: 10; 325 TABLET ORAL at 06:02

## 2020-02-14 NOTE — PLAN OF CARE
Problem: Infection  Goal: Infection Symptom Resolution  Outcome: Ongoing, Progressing     Problem: Adult Inpatient Plan of Care  Goal: Patient-Specific Goal (Individualization)  Outcome: Ongoing, Progressing     Problem: Adult Inpatient Plan of Care  Goal: Plan of Care Review  Outcome: Ongoing, Progressing     Dressing to buttocks intact. Managing pain with Po pain medication.

## 2020-02-14 NOTE — PLAN OF CARE
02/14/20 0945   Final Note   Assessment Type Final Discharge Note   Anticipated Discharge Disposition Home   What phone number can be called within the next 1-3 days to see how you are doing after discharge? [phone]   Hospital Follow Up  Appt(s) scheduled? Yes   Discharge plans and expectations educations in teach back method with documentation complete? Yes    Verbal & written follow up appointment provided to patient & spouse. Also provided with voucher for prescriptions & explained what it covers & where she can use it. Demonstrated understanding by verbal feedback. Denies any other discharge needs at this time. Patient's spouse states he will be able to do dressing changes. My phone number provided to patient in case he is unable to do at home & she can come back here for dressing changes.

## 2020-02-14 NOTE — PLAN OF CARE
Problem: Adult Inpatient Plan of Care  Goal: Plan of Care Review  2/14/2020 0334 by Hannah Gonsales RN  Outcome: Ongoing, Progressing  2/14/2020 0153 by Hannah Gonsales RN  Outcome: Ongoing, Progressing     Problem: Adult Inpatient Plan of Care  Goal: Optimal Comfort and Wellbeing  2/14/2020 0334 by Hannah Gonsales RN  Outcome: Ongoing, Progressing  2/14/2020 0153 by Hannah Gonsales RN  Outcome: Ongoing, Progressing     Problem: Infection  Goal: Infection Symptom Resolution  2/14/2020 0334 by Hannah Gonsales RN  Outcome: Ongoing, Progressing  2/14/2020 0153 by Hannah Gonsales RN  Outcome: Ongoing, Progressing     Problem: Pain Acute  Goal: Optimal Pain Control  Outcome: Ongoing, Progressing    Continue plan of care. Medication for pain given per orders. Dressing reinforced to buttocks.

## 2020-02-14 NOTE — NURSING
Demonstrated wound care to  and gave written instructions.  verbalized understanding. Patient was advised to continue packing wound until wound is closed. Also discussed s/s of infection and when to call the doctor or return to ER. Gave patient prescriptions in hand. Removed IV per protocol. Escorted patient to car via wheelchair.

## 2020-02-14 NOTE — DISCHARGE INSTRUCTIONS
Change packing daily and after each bowel movement.  Soak packing with saline before removing from wound.  Cleanse wound with 1 full syringe (60 mL) of saline.  Repack wound with 30 cm saline soaked strips or 2x2 gauze.  Cover with Abd pad and secure with tape.     If preferred, take pain medication about 30-45 before dressing change.

## 2020-02-14 NOTE — DISCHARGE SUMMARY
[facility] Hospital Medicine  Discharge Summary      Patient Name: Kaylene Garcia  MRN: [id number]  Admission Date: 2/12/2020  Hospital Length of Stay: 0 days  Discharge Date and Time:  02/14/2020 9:31 AM  Attending Physician: Jak Bettencourt MD   Discharging Provider: Jak Bettencourt MD  Primary Care Provider: Josefina Hartman      HPI:   Patient is a 39-year-old female that presented to the emergency department complaining of an abscess around her rectum.  Patient states that she has had several year history of intermittent rectal abscesses.  Patient states that she has had several drain in the emergency department but never having had 1 drained inpatient in surgery.  Patient states they will get better and then get worse again and but generally they are always draining.  She denies any fever chills nausea or vomiting but states this is very painful and inconvenient.  Patient has a past medical history of hypertension, hyperlipidemia,, anxiety, and migraine headaches.  Patient was seen in the emergency department and started on antibiotics and was given something for pain which we will continue on admission to medical-surgical unit.  Apparently Dr. Cannon is aware of this patient and will see this patient sometime this afternoon and possibly take to surgery this afternoon reportedly from the emergency department    Procedure(s) (LRB):  INCISION, ABSCESS, PERIRECTAL (Left)  ANOSCOPY (N/A)      Hospital Course:   CT of the abdomen pelvis in the emergency department revealed findings suspicious for left perirectal abscess.  Sodium 149 potassium 3.4 chloride 94 BUN creatinine were 21.3 and GFR 59.7 white blood cell count normal at 7.6 hemoglobin 13.7 hematocrit 38.5 platelets will 373 with normal differential.    02/13/2020:  Patient is stable postop day 1 from incision drainage of perirectal abscess.  Labs show no significant abnormalities.  Sodium 130 9 GFR greater than 60.  White  blood cell count 9.0 hemoglobin 14 hematocrit 39 MCV 95 platelets 369 otherwise normal differential.  Vital signs have been normal with no fever and blood pressure under control with normal O2 saturations from 94-98% on room air.  Patient is having still some pain but generally feeling better.  Wound repacked on day 1 and patient is otherwise stable.    02/14/2020:  Patient is stable postop day 2.  Trying to have a bowel movement this morning.  Patient will be patient will be shown how to repack the wound and we discharged home with follow-up with Dr. Cannon in 2 weeks.         Consults:   Consults (From admission, onward)        Status Ordering Provider     Inpatient consult to General Surgery  Once     Provider:  Musa Cannon MD    Completed CORA SALINAS          No new Assessment & Plan notes have been filed under this hospital service since the last note was generated.  Service: Hospital Medicine    Final Active Diagnoses:    Diagnosis Date Noted POA    PRINCIPAL PROBLEM:  Perirectal abscess [K61.1] 02/12/2020 Yes    Encounter for postoperative care [Z48.89]  Not Applicable      Problems Resolved During this Admission:       Discharged Condition: stable    Disposition:     Follow Up:  Follow-up Information     Musa Cannon MD In 2 weeks.    Specialty:  General Surgery  Contact information:  [address] [facility] GENERAL SURG [facility] [address] [phone]                 Patient Instructions:   No discharge procedures on file.    Significant Diagnostic Studies: Labs: All labs within the past 24 hours have been reviewed    Pending Diagnostic Studies:     Procedure Component Value Units Date/Time    Specimen to Pathology, Surgery General Surgery [[id number]] Collected:  02/12/20 1940    Order Status:  Sent Lab Status:  In process Updated:  02/13/20 3552         Medications:  Reconciled Home Medications:      Medication List      START taking these medications    levoFLOXacin 500 MG  tablet  Commonly known as:  LEVAQUIN  Take 1 tablet (500 mg total) by mouth once daily.     oxyCODONE-acetaminophen  mg per tablet  Commonly known as:  PERCOCET  Take 1 tablet by mouth every 4 (four) hours as needed.     traZODone 50 MG tablet  Commonly known as:  DESYREL  Take 0.5 tablets (25 mg total) by mouth nightly as needed for Insomnia.        CONTINUE taking these medications    atorvastatin 10 MG tablet  Commonly known as:  LIPITOR  Take 10 mg by mouth once daily.     chlorthalidone 25 MG Tab  Commonly known as:  HYGROTEN  Take 25 mg by mouth once daily.     lisinopril 10 MG tablet  Take 20 mg by mouth once daily.            Indwelling Lines/Drains at time of discharge:   Lines/Drains/Airways     None                 Time spent on the discharge of patient: 30 minutes  Patient was seen and examined on the date of discharge and determined to be suitable for discharge.         Jak Bettencourt MD  Department of Hospital Medicine  [facility]

## 2020-02-15 LAB — BACTERIA SPEC AEROBE CULT: ABNORMAL

## 2020-02-17 LAB — BACTERIA SPEC ANAEROBE CULT: NORMAL

## 2020-02-18 LAB
FINAL PATHOLOGIC DIAGNOSIS: NORMAL
GROSS: NORMAL

## 2020-02-28 ENCOUNTER — TELEPHONE (OUTPATIENT)
Dept: SURGERY | Facility: CLINIC | Age: 40
End: 2020-02-28

## 2020-02-28 NOTE — TELEPHONE ENCOUNTER
----- Message from Eugenia Molina sent at 2/28/2020 11:17 AM CST -----  Contact: Pt  Type:  Sooner Apoointment Request    Caller is requesting a sooner appointment.  Caller declined first available appointment listed below.  Caller will not accept being placed on the waitlist and is requesting a message be sent to doctor.    Name of Caller:  Pt  When is the first available appointment?  03/16  Symptoms:  Post op  Best Call Back Number:  [phone]  Additional Information:  Please Advise ---Thank you

## 2020-02-28 NOTE — TELEPHONE ENCOUNTER
Returned phone call to pt. Pt missed her appointment on Thursday, 2-27-20.  Rescheduled pt for follow up on Monday, 3-2-20 at 9:15 am.

## 2020-03-02 ENCOUNTER — OFFICE VISIT (OUTPATIENT)
Dept: SURGERY | Facility: CLINIC | Age: 40
End: 2020-03-02

## 2020-03-02 VITALS
TEMPERATURE: 98 F | OXYGEN SATURATION: 96 % | DIASTOLIC BLOOD PRESSURE: 72 MMHG | SYSTOLIC BLOOD PRESSURE: 107 MMHG | HEART RATE: 77 BPM | WEIGHT: 169 LBS | RESPIRATION RATE: 12 BRPM | HEIGHT: 65 IN | BODY MASS INDEX: 28.16 KG/M2

## 2020-03-02 DIAGNOSIS — Z48.89 ENCOUNTER FOR POSTOPERATIVE CARE: Primary | ICD-10-CM

## 2020-03-02 PROCEDURE — 99024 POSTOP FOLLOW-UP VISIT: CPT | Mod: S$GLB,,, | Performed by: SURGERY

## 2020-03-02 PROCEDURE — 99024 PR POST-OP FOLLOW-UP VISIT: ICD-10-PCS | Mod: S$GLB,,, | Performed by: SURGERY

## 2020-03-02 RX ORDER — LISINOPRIL 20 MG/1
20 TABLET ORAL DAILY
COMMUNITY
Start: 2020-01-20 | End: 2022-01-08 | Stop reason: SDUPTHER

## 2020-03-02 NOTE — PROGRESS NOTES
"Subjective:       Patient ID: Kaylene Garcia is a 39 y.o. female.    Chief Complaint: Post-op Evaluation (I&D Perirectal Abscess (2/12/20))      HPI:  Ms. Garcia presents today with no complaints.  She recently had an uncomplicated incision and drainage of a perirectal abscess  She is not experiencing any pain, nausea, vomiting, diarrhea, constipation, fevers, chills, wound concerns, etc.  No wound redness, swelling, drainage, odor, etc.  Activity tolerance has returned to normal.  Appetite is excellent.  Overall she feels "great".      Allergies & Meds:  Review of patient's allergies indicates:   Allergen Reactions    Pcn [penicillins] Anaphylaxis    Iodine and iodide containing products Itching and Rash       Current Outpatient Medications   Medication Sig Dispense Refill    atorvastatin (LIPITOR) 10 MG tablet Take 10 mg by mouth once daily.      chlorthalidone (HYGROTEN) 25 MG Tab Take 25 mg by mouth once daily.      levoFLOXacin (LEVAQUIN) 500 MG tablet Take 1 tablet (500 mg total) by mouth once daily. 10 tablet 0    levoFLOXacin (LEVAQUIN) 500 MG tablet Take 1 tablet (500 mg total) by mouth once daily. 10 tablet 0    lisinopril (PRINIVIL,ZESTRIL) 20 MG tablet Take 20 mg by mouth once daily.      traZODone (DESYREL) 50 MG tablet Take 0.5 tablets (25 mg total) by mouth nightly as needed for Insomnia. 30 tablet 2    oxyCODONE-acetaminophen (PERCOCET)  mg per tablet Take 1 tablet by mouth every 6 (six) hours as needed. (Patient not taking: Reported on 3/2/2020) 20 tablet 0     No current facility-administered medications for this visit.        PMFSHx:    Past Medical History:   Diagnosis Date    Anxiety     Hyperlipidemia     Hypertension     Migraine headache        Past Surgical History:   Procedure Laterality Date    ANOSCOPY N/A 2/12/2020    Procedure: ANOSCOPY;  Surgeon: Musa Cannon MD;  Location: [facility];  Service: General;  Laterality: N/A;    INCISION OF PERIRECTAL ABSCESS Left " 2/12/2020    Procedure: INCISION, ABSCESS, PERIRECTAL;  Surgeon: Musa Cannon MD;  Location: [facility] OR;  Service: General;  Laterality: Left;       No family history on file.    Social History     Tobacco Use    Smoking status: Current Every Day Smoker     Packs/day: 1.00     Years: 20.00     Pack years: 20.00     Types: Cigarettes    Smokeless tobacco: Never Used   Substance Use Topics    Alcohol use: Yes     Alcohol/week: 7.0 standard drinks     Types: 7 Glasses of wine per week    Drug use: Yes     Frequency: 3.0 times per week     Types: Marijuana         Review of Systems   Constitutional: Negative for appetite change, chills, fatigue, fever and unexpected weight change.   HENT: Negative for congestion, dental problem, ear pain, mouth sores, postnasal drip, rhinorrhea, sore throat, tinnitus, trouble swallowing and voice change.    Eyes: Negative for photophobia, pain, discharge and visual disturbance.   Respiratory: Negative for cough, chest tightness, shortness of breath and wheezing.    Cardiovascular: Negative for chest pain, palpitations and leg swelling.   Gastrointestinal: Negative for abdominal pain, blood in stool, constipation, diarrhea, nausea and vomiting.   Endocrine: Negative for cold intolerance, heat intolerance, polydipsia, polyphagia and polyuria.   Genitourinary: Negative for difficulty urinating, dysuria, flank pain, frequency, hematuria and urgency.   Musculoskeletal: Negative for arthralgias, joint swelling and myalgias.   Skin: Negative for color change and rash.   Allergic/Immunologic: Negative for immunocompromised state.   Neurological: Negative for dizziness, tremors, seizures, syncope, speech difficulty, weakness, numbness and headaches.   Hematological: Negative for adenopathy. Does not bruise/bleed easily.   Psychiatric/Behavioral: Negative for agitation, confusion, hallucinations, self-injury and suicidal ideas. The patient is not nervous/anxious.        Objective:       Physical Exam   Constitutional: Vital signs are normal. She is active. No distress (no acute distress).   Cardiovascular: Normal rate, regular rhythm and normal heart sounds.   Pulmonary/Chest: Effort normal and breath sounds normal. Respiratory distress: no respiratory distress.   Abdominal: Soft. Normal appearance and bowel sounds are normal. Distention: nondistended. Tenderness: nontender.   Neurological: She is alert.   Skin: Skin is warm, dry and intact.   Wound healed well without erythema, edema, exudate, induration, fluctuance, crepitus, necrosis, discoloration, separation           Assessment:       Satisfactory recovery.  No evident complications.  Wound healed by secondary intention.    1. Encounter for postoperative care          Plan      Encounter for postoperative care          Follow up for any concerns or questions as needed, resume care with PCP.

## 2020-06-04 NOTE — ED PROVIDER NOTES
Encounter Date: 2/12/2020       History     Chief Complaint   Patient presents with    Abscess     Patient presents to ER with complaint of abscess to right buttock.  Patient states has had similar symptoms several times.  Patient states 1 month ago had an abscess lanced and then placed on antibiotics.  Patient stated resolved the 1 week ago started to have similar symptoms. Patient states is getting more painful and bigger.  Patient denies any fever nausea vomiting diarrhea.  Patient denied history of diabetes.  Patient denies history of trauma. Patient denied having taking any over-the-counter medicine for pain prior to arrival in the ER.  Patient denied other associated symptoms.  PT last oral intake 0500 today.    The history is provided by the patient.     Review of patient's allergies indicates:   Allergen Reactions    Pcn [penicillins] Anaphylaxis    Iodine and iodide containing products Itching and Rash     Past Medical History:   Diagnosis Date    Anxiety     Hyperlipidemia     Hypertension     Migraine headache      History reviewed. No pertinent surgical history.  History reviewed. No pertinent family history.  Social History     Tobacco Use    Smoking status: Current Every Day Smoker     Packs/day: 1.00     Years: 20.00     Pack years: 20.00     Types: Cigarettes    Smokeless tobacco: Never Used   Substance Use Topics    Alcohol use: Yes     Alcohol/week: 7.0 standard drinks     Types: 7 Glasses of wine per week    Drug use: Yes     Frequency: 3.0 times per week     Types: Marijuana     Review of Systems   Constitutional: Negative for chills and fever.   HENT: Negative for sore throat.    Respiratory: Negative for shortness of breath.    Cardiovascular: Negative for chest pain.   Gastrointestinal: Negative for abdominal pain, constipation, diarrhea, nausea and vomiting.   Genitourinary: Negative for dysuria.   Musculoskeletal: Negative for back pain, neck pain and neck stiffness.   Skin:  Positive for rash (Right buttock).   Neurological: Negative for weakness.   Hematological: Does not bruise/bleed easily.   All other systems reviewed and are negative.      Physical Exam     Initial Vitals [02/12/20 1325]   BP Pulse Resp Temp SpO2   110/68 93 16 98.3 °F (36.8 °C) 95 %      MAP       --         Physical Exam    Nursing note and vitals reviewed.  Constitutional: She appears well-developed and well-nourished. She is not diaphoretic. No distress.   HENT:   Head: Atraumatic.   Right Ear: External ear normal.   Left Ear: External ear normal.   Nose: Nose normal.   Mouth/Throat: Oropharynx is clear and moist. No oropharyngeal exudate.   Eyes: Conjunctivae are normal. Right eye exhibits no discharge. Left eye exhibits no discharge.   Neck: Normal range of motion. Neck supple.   Cardiovascular: Normal rate, regular rhythm, normal heart sounds and intact distal pulses. Exam reveals no gallop and no friction rub.    No murmur heard.  Pulmonary/Chest: Breath sounds normal. No respiratory distress. She has no wheezes. She has no rhonchi. She has no rales. She exhibits no tenderness.   Genitourinary:   Genitourinary Comments: Pain to palpation of rectum on the same side as the abscess.   Musculoskeletal: Normal range of motion.   Neurological: She is alert and oriented to person, place, and time. GCS score is 15. GCS eye subscore is 4. GCS verbal subscore is 5. GCS motor subscore is 6.   Skin: Skin is warm and dry. Capillary refill takes less than 2 seconds. Abscess (Right buttock just lateral of the anus) noted. There is erythema ( right buttock just lateral of the anus).   Psychiatric: She has a normal mood and affect. Thought content normal.         ED Course   Procedures  Labs Reviewed   CBC W/ AUTO DIFFERENTIAL - Abnormal; Notable for the following components:       Result Value    Mean Corpuscular Hemoglobin 33.5 (*)     Platelets 373 (*)     All other components within normal limits   BASIC METABOLIC PANEL  - Abnormal; Notable for the following components:    Sodium 129 (*)     Potassium 3.4 (*)     Chloride 94 (*)     eGFR if  59.7 (*)     eGFR if non  51.8 (*)     All other components within normal limits   PREGNANCY TEST, URINE RAPID          Imaging Results           CT Pelvis Without Contrast (Final result)  Result time 02/12/20 14:22:26   Procedure changed from CT Pelvis With Contrast     Final result by Gene Candelaria MD (02/12/20 14:22:26)                 Impression:      1. Findings suspicious for a left perirectal abscess.  Repeat imaging with both intravenous and rectal contrast is recommended.  2. Diverticulosis.  This report was flagged in Epic as abnormal.      Electronically signed by: Gene Candelaria  Date:    02/12/2020  Time:    14:22             Narrative:    EXAMINATION:  CT PELVIS WITHOUT CONTRAST    CLINICAL HISTORY:  Rectal abscess;    TECHNIQUE:  Multiple noncontrast contiguous axial images were obtained through the pelvis.  Coronal sagittal re-formatted images reconstructed.    COMPARISON:  CT 11/12/2008.    FINDINGS:  There is left perirectal subcutaneous soft tissue attenuation at the level of the anal verge measuring approximately 3.0 cm AP x 1.5 cm transverse by 3.4 cm cc.  This is suspicious for a perirectal abscess.  However, evaluation is limited due to lack of intravenous contrast.    The bladder is distended.  Uterus and ovaries are normal in size and attenuation.  No free fluid in cul-de-sac.    Air and stool throughout the visualized loops of colon.  Scattered diverticula within the distal colon.  No mesenteric inflammatory change to suggest an acute diverticulitis.    No significant iliac or inguinal lymphadenopathy.                                 Medical Decision Making:   Differential Diagnosis:   Perianal abscess, perirectal abscess, Abscess,   Clinical Tests:   Lab Tests: Ordered and Reviewed  Radiological Study: Ordered and Reviewed  ED  Management:  Discussed with the patient plan of care will order labs and imaging and re-evaluate patient verbalized she understood she did not have any questions.    Discussed case with the surgeon states patient is to be admitted to the hospitalist and will take the patient to surgery this evening.  Surgeon request patient be placed on Ivanz    Discussed with the patient need for admission and surgery patient verbalized understanding and accepted admission.    Discussed case with hospitalist stated he will accept the patient.    On re-evaluation post pain medication administration patient states is not in pain at the current moment.    This note was created using [employer] voice recognition software that occasionally misinterpreted phrases or words.                   ED Course as of Feb 12 1450   Wed Feb 12, 2020   1444 Dr. Cannon requested Admission to hospitalist over night Dr. Bettencourt called stated will see pt on the floor.    [WA]      ED Course User Index  [WA] GUDELIA Baez                Clinical Impression:       ICD-10-CM ICD-9-CM   1. Perirectal abscess K61.1 566                             GUDELIA Baez  02/12/20 2785     3 linerar superficial lacerations, copious cleaning, neurovascular or motor deficits distal to injury or to hand.  Copious irrigation used, no FB, skin adhesive used.

## 2021-07-07 ENCOUNTER — HOSPITAL ENCOUNTER (EMERGENCY)
Facility: HOSPITAL | Age: 41
Discharge: HOME | End: 2021-07-07
Attending: FAMILY MEDICINE

## 2021-07-07 VITALS
HEIGHT: 65 IN | RESPIRATION RATE: 18 BRPM | WEIGHT: 165 LBS | TEMPERATURE: 98 F | OXYGEN SATURATION: 97 % | SYSTOLIC BLOOD PRESSURE: 109 MMHG | BODY MASS INDEX: 27.49 KG/M2 | DIASTOLIC BLOOD PRESSURE: 74 MMHG | HEART RATE: 70 BPM

## 2021-07-07 DIAGNOSIS — T78.40XA ALLERGIC REACTION, INITIAL ENCOUNTER: Primary | ICD-10-CM

## 2021-07-07 PROCEDURE — 25000242 PHARM REV CODE 250 ALT 637 W/ HCPCS: Performed by: FAMILY MEDICINE

## 2021-07-07 PROCEDURE — 25000003 PHARM REV CODE 250: Performed by: FAMILY MEDICINE

## 2021-07-07 PROCEDURE — 63600175 PHARM REV CODE 636 W HCPCS: Performed by: FAMILY MEDICINE

## 2021-07-07 PROCEDURE — 96374 THER/PROPH/DIAG INJ IV PUSH: CPT

## 2021-07-07 PROCEDURE — 94640 AIRWAY INHALATION TREATMENT: CPT

## 2021-07-07 PROCEDURE — 99284 EMERGENCY DEPT VISIT MOD MDM: CPT | Mod: 25

## 2021-07-07 PROCEDURE — 96375 TX/PRO/DX INJ NEW DRUG ADDON: CPT

## 2021-07-07 RX ORDER — METHYLPREDNISOLONE SOD SUCC 125 MG
125 VIAL (EA) INJECTION
Status: COMPLETED | OUTPATIENT
Start: 2021-07-07 | End: 2021-07-07

## 2021-07-07 RX ORDER — FAMOTIDINE 10 MG/ML
40 INJECTION INTRAVENOUS
Status: COMPLETED | OUTPATIENT
Start: 2021-07-07 | End: 2021-07-07

## 2021-07-07 RX ORDER — OXYCODONE AND ACETAMINOPHEN 5; 325 MG/1; MG/1
1 TABLET ORAL
Status: COMPLETED | OUTPATIENT
Start: 2021-07-07 | End: 2021-07-07

## 2021-07-07 RX ORDER — DIPHENHYDRAMINE HYDROCHLORIDE 50 MG/ML
25 INJECTION INTRAMUSCULAR; INTRAVENOUS
Status: DISCONTINUED | OUTPATIENT
Start: 2021-07-07 | End: 2021-07-07 | Stop reason: HOSPADM

## 2021-07-07 RX ORDER — EPINEPHRINE 0.3 MG/.3ML
1 INJECTION SUBCUTANEOUS
Qty: 1 EACH | Refills: 1 | Status: SHIPPED | OUTPATIENT
Start: 2021-07-07 | End: 2022-07-07

## 2021-07-07 RX ORDER — ALBUTEROL SULFATE 0.83 MG/ML
2.5 SOLUTION RESPIRATORY (INHALATION)
Status: COMPLETED | OUTPATIENT
Start: 2021-07-07 | End: 2021-07-07

## 2021-07-07 RX ADMIN — ALBUTEROL SULFATE 2.5 MG: 2.5 SOLUTION RESPIRATORY (INHALATION) at 12:07

## 2021-07-07 RX ADMIN — FAMOTIDINE 40 MG: 10 INJECTION INTRAVENOUS at 12:07

## 2021-07-07 RX ADMIN — METHYLPREDNISOLONE SODIUM SUCCINATE 125 MG: 125 INJECTION, POWDER, FOR SOLUTION INTRAMUSCULAR; INTRAVENOUS at 12:07

## 2021-07-07 RX ADMIN — OXYCODONE AND ACETAMINOPHEN 1 TABLET: 5; 325 TABLET ORAL at 02:07

## 2021-07-14 ENCOUNTER — HOSPITAL ENCOUNTER (OUTPATIENT)
Facility: HOSPITAL | Age: 41
Discharge: HOME | End: 2021-07-18
Attending: EMERGENCY MEDICINE | Admitting: HOSPITALIST

## 2021-07-14 ENCOUNTER — ANESTHESIA EVENT (OUTPATIENT)
Dept: SURGERY | Facility: HOSPITAL | Age: 41
End: 2021-07-14

## 2021-07-14 ENCOUNTER — ANESTHESIA (OUTPATIENT)
Dept: SURGERY | Facility: HOSPITAL | Age: 41
End: 2021-07-14

## 2021-07-14 DIAGNOSIS — Z48.89 ENCOUNTER FOR POSTOPERATIVE CARE: ICD-10-CM

## 2021-07-14 DIAGNOSIS — E78.2 MIXED HYPERLIPIDEMIA: ICD-10-CM

## 2021-07-14 DIAGNOSIS — R87.610 ATYPICAL SQUAMOUS CELLS OF UNDETERMINED SIGNIFICANCE ON CYTOLOGIC SMEAR OF CERVIX (ASC-US): ICD-10-CM

## 2021-07-14 DIAGNOSIS — F41.9 ANXIETY: ICD-10-CM

## 2021-07-14 DIAGNOSIS — K61.1 PERIRECTAL ABSCESS: Primary | ICD-10-CM

## 2021-07-14 DIAGNOSIS — R87.810 CERVICAL HIGH RISK HUMAN PAPILLOMAVIRUS (HPV) DNA TEST POSITIVE: ICD-10-CM

## 2021-07-14 LAB
ALBUMIN SERPL BCP-MCNC: 3.9 G/DL (ref 3.5–5.2)
ALP SERPL-CCNC: 65 U/L (ref 55–135)
ALT SERPL W/O P-5'-P-CCNC: 10 U/L (ref 10–44)
ANION GAP SERPL CALC-SCNC: 12 MMOL/L (ref 8–16)
AST SERPL-CCNC: 12 U/L (ref 10–40)
B-HCG UR QL: NEGATIVE
BASOPHILS # BLD AUTO: 0.08 K/UL (ref 0–0.2)
BASOPHILS NFR BLD: 0.6 % (ref 0–1.9)
BILIRUB SERPL-MCNC: 0.5 MG/DL (ref 0.1–1)
BILIRUB UR QL STRIP: NEGATIVE
BUN SERPL-MCNC: 14 MG/DL (ref 6–20)
CALCIUM SERPL-MCNC: 10 MG/DL (ref 8.7–10.5)
CHLORIDE SERPL-SCNC: 100 MMOL/L (ref 95–110)
CLARITY UR: CLEAR
CO2 SERPL-SCNC: 25 MMOL/L (ref 23–29)
COLOR UR: YELLOW
CREAT SERPL-MCNC: 0.8 MG/DL (ref 0.5–1.4)
DIFFERENTIAL METHOD: ABNORMAL
EOSINOPHIL # BLD AUTO: 0.3 K/UL (ref 0–0.5)
EOSINOPHIL NFR BLD: 1.9 % (ref 0–8)
ERYTHROCYTE [DISTWIDTH] IN BLOOD BY AUTOMATED COUNT: 12.4 % (ref 11.5–14.5)
EST. GFR  (AFRICAN AMERICAN): >60 ML/MIN/1.73 M^2
EST. GFR  (NON AFRICAN AMERICAN): >60 ML/MIN/1.73 M^2
GLUCOSE SERPL-MCNC: 96 MG/DL (ref 70–110)
GLUCOSE UR QL STRIP: NEGATIVE
HCT VFR BLD AUTO: 40.5 % (ref 37–48.5)
HGB BLD-MCNC: 13.8 G/DL (ref 12–16)
HGB UR QL STRIP: ABNORMAL
IMM GRANULOCYTES # BLD AUTO: 0.06 K/UL (ref 0–0.04)
IMM GRANULOCYTES NFR BLD AUTO: 0.4 % (ref 0–0.5)
KETONES UR QL STRIP: NEGATIVE
LACTATE SERPL-SCNC: 1.1 MMOL/L (ref 0.5–2.2)
LEUKOCYTE ESTERASE UR QL STRIP: NEGATIVE
LYMPHOCYTES # BLD AUTO: 2.2 K/UL (ref 1–4.8)
LYMPHOCYTES NFR BLD: 16.1 % (ref 18–48)
MCH RBC QN AUTO: 32.7 PG (ref 27–31)
MCHC RBC AUTO-ENTMCNC: 34.1 G/DL (ref 32–36)
MCV RBC AUTO: 96 FL (ref 82–98)
MONOCYTES # BLD AUTO: 1 K/UL (ref 0.3–1)
MONOCYTES NFR BLD: 7.3 % (ref 4–15)
NEUTROPHILS # BLD AUTO: 10.3 K/UL (ref 1.8–7.7)
NEUTROPHILS NFR BLD: 73.7 % (ref 38–73)
NITRITE UR QL STRIP: NEGATIVE
NRBC BLD-RTO: 0 /100 WBC
PH UR STRIP: 6 [PH] (ref 5–8)
PLATELET # BLD AUTO: 381 K/UL (ref 150–450)
PMV BLD AUTO: 10.7 FL (ref 9.2–12.9)
POTASSIUM SERPL-SCNC: 4 MMOL/L (ref 3.5–5.1)
PROT SERPL-MCNC: 7.4 G/DL (ref 6–8.4)
PROT UR QL STRIP: NEGATIVE
RBC # BLD AUTO: 4.22 M/UL (ref 4–5.4)
SARS-COV-2 RDRP RESP QL NAA+PROBE: NEGATIVE
SODIUM SERPL-SCNC: 137 MMOL/L (ref 136–145)
SP GR UR STRIP: 1.02 (ref 1–1.03)
URN SPEC COLLECT METH UR: ABNORMAL
UROBILINOGEN UR STRIP-ACNC: NEGATIVE EU/DL
WBC # BLD AUTO: 13.91 K/UL (ref 3.9–12.7)

## 2021-07-14 PROCEDURE — 96374 THER/PROPH/DIAG INJ IV PUSH: CPT | Mod: 59

## 2021-07-14 PROCEDURE — 36000704 HC OR TIME LEV I 1ST 15 MIN: Performed by: SURGERY

## 2021-07-14 PROCEDURE — 36000705 HC OR TIME LEV I EA ADD 15 MIN: Performed by: SURGERY

## 2021-07-14 PROCEDURE — 72192 CT PELVIS W/O DYE: CPT | Mod: TC

## 2021-07-14 PROCEDURE — 96372 THER/PROPH/DIAG INJ SC/IM: CPT | Mod: 59

## 2021-07-14 PROCEDURE — 96361 HYDRATE IV INFUSION ADD-ON: CPT

## 2021-07-14 PROCEDURE — 83605 ASSAY OF LACTIC ACID: CPT | Performed by: PHYSICIAN ASSISTANT

## 2021-07-14 PROCEDURE — 80053 COMPREHEN METABOLIC PANEL: CPT | Performed by: PHYSICIAN ASSISTANT

## 2021-07-14 PROCEDURE — 72192 CT PELVIS W/O DYE: CPT | Mod: 26,,, | Performed by: RADIOLOGY

## 2021-07-14 PROCEDURE — 87075 CULTR BACTERIA EXCEPT BLOOD: CPT | Performed by: HOSPITALIST

## 2021-07-14 PROCEDURE — 85025 COMPLETE CBC W/AUTO DIFF WBC: CPT | Performed by: PHYSICIAN ASSISTANT

## 2021-07-14 PROCEDURE — 81025 URINE PREGNANCY TEST: CPT | Performed by: PHYSICIAN ASSISTANT

## 2021-07-14 PROCEDURE — 46040 PR I&D PERIRECTAL ABSCESS: ICD-10-PCS | Mod: ,,, | Performed by: SURGERY

## 2021-07-14 PROCEDURE — 87076 CULTURE ANAEROBE IDENT EACH: CPT | Performed by: HOSPITALIST

## 2021-07-14 PROCEDURE — 37000008 HC ANESTHESIA 1ST 15 MINUTES: Performed by: SURGERY

## 2021-07-14 PROCEDURE — 87040 BLOOD CULTURE FOR BACTERIA: CPT | Performed by: PHYSICIAN ASSISTANT

## 2021-07-14 PROCEDURE — 99214 OFFICE O/P EST MOD 30 MIN: CPT | Mod: 57,,, | Performed by: SURGERY

## 2021-07-14 PROCEDURE — 63600175 PHARM REV CODE 636 W HCPCS: Performed by: SURGERY

## 2021-07-14 PROCEDURE — 25000003 PHARM REV CODE 250: Performed by: PHYSICIAN ASSISTANT

## 2021-07-14 PROCEDURE — 25000003 PHARM REV CODE 250: Performed by: SURGERY

## 2021-07-14 PROCEDURE — 99214 PR OFFICE/OUTPT VISIT, EST, LEVL IV, 30-39 MIN: ICD-10-PCS | Mod: 57,,, | Performed by: SURGERY

## 2021-07-14 PROCEDURE — D9220A PRA ANESTHESIA: Mod: ,,, | Performed by: ANESTHESIOLOGY

## 2021-07-14 PROCEDURE — 71000033 HC RECOVERY, INTIAL HOUR: Performed by: SURGERY

## 2021-07-14 PROCEDURE — 96375 TX/PRO/DX INJ NEW DRUG ADDON: CPT

## 2021-07-14 PROCEDURE — 96376 TX/PRO/DX INJ SAME DRUG ADON: CPT

## 2021-07-14 PROCEDURE — 96365 THER/PROPH/DIAG IV INF INIT: CPT

## 2021-07-14 PROCEDURE — 81003 URINALYSIS AUTO W/O SCOPE: CPT | Performed by: PHYSICIAN ASSISTANT

## 2021-07-14 PROCEDURE — 63600175 PHARM REV CODE 636 W HCPCS: Performed by: PHYSICIAN ASSISTANT

## 2021-07-14 PROCEDURE — G0378 HOSPITAL OBSERVATION PER HR: HCPCS

## 2021-07-14 PROCEDURE — S4991 NICOTINE PATCH NONLEGEND: HCPCS | Performed by: SURGERY

## 2021-07-14 PROCEDURE — D9220A PRA ANESTHESIA: ICD-10-PCS | Mod: ,,, | Performed by: ANESTHESIOLOGY

## 2021-07-14 PROCEDURE — S0030 INJECTION, METRONIDAZOLE: HCPCS | Performed by: PHYSICIAN ASSISTANT

## 2021-07-14 PROCEDURE — U0002 COVID-19 LAB TEST NON-CDC: HCPCS | Performed by: PHYSICIAN ASSISTANT

## 2021-07-14 PROCEDURE — 99285 EMERGENCY DEPT VISIT HI MDM: CPT | Mod: 25

## 2021-07-14 PROCEDURE — 87070 CULTURE OTHR SPECIMN AEROBIC: CPT | Performed by: HOSPITALIST

## 2021-07-14 PROCEDURE — 25000003 PHARM REV CODE 250: Performed by: NURSE ANESTHETIST, CERTIFIED REGISTERED

## 2021-07-14 PROCEDURE — 72192 CT PELVIS WITHOUT CONTRAST: ICD-10-PCS | Mod: 26,,, | Performed by: RADIOLOGY

## 2021-07-14 PROCEDURE — 46040 I&D ISCHIORCT&/PERIRCT ABSC: CPT | Mod: ,,, | Performed by: SURGERY

## 2021-07-14 PROCEDURE — 63600175 PHARM REV CODE 636 W HCPCS: Performed by: NURSE ANESTHETIST, CERTIFIED REGISTERED

## 2021-07-14 PROCEDURE — 37000009 HC ANESTHESIA EA ADD 15 MINS: Performed by: SURGERY

## 2021-07-14 RX ORDER — ONDANSETRON 2 MG/ML
4 INJECTION INTRAMUSCULAR; INTRAVENOUS EVERY 6 HOURS PRN
Status: DISCONTINUED | OUTPATIENT
Start: 2021-07-14 | End: 2021-07-17

## 2021-07-14 RX ORDER — DIPHENHYDRAMINE HYDROCHLORIDE 50 MG/ML
INJECTION INTRAMUSCULAR; INTRAVENOUS
Status: DISCONTINUED | OUTPATIENT
Start: 2021-07-14 | End: 2021-07-14

## 2021-07-14 RX ORDER — CLINDAMYCIN PHOSPHATE 600 MG/50ML
600 INJECTION, SOLUTION INTRAVENOUS
Status: DISCONTINUED | OUTPATIENT
Start: 2021-07-14 | End: 2021-07-16

## 2021-07-14 RX ORDER — DIPHENHYDRAMINE HYDROCHLORIDE 50 MG/ML
12.5 INJECTION INTRAMUSCULAR; INTRAVENOUS
Status: DISCONTINUED | OUTPATIENT
Start: 2021-07-14 | End: 2021-07-14

## 2021-07-14 RX ORDER — ENOXAPARIN SODIUM 100 MG/ML
40 INJECTION SUBCUTANEOUS EVERY 24 HOURS
Status: DISCONTINUED | OUTPATIENT
Start: 2021-07-14 | End: 2021-07-18 | Stop reason: HOSPADM

## 2021-07-14 RX ORDER — DEXAMETHASONE SODIUM PHOSPHATE 4 MG/ML
INJECTION, SOLUTION INTRA-ARTICULAR; INTRALESIONAL; INTRAMUSCULAR; INTRAVENOUS; SOFT TISSUE
Status: DISCONTINUED | OUTPATIENT
Start: 2021-07-14 | End: 2021-07-14

## 2021-07-14 RX ORDER — SODIUM CHLORIDE, SODIUM LACTATE, POTASSIUM CHLORIDE, CALCIUM CHLORIDE 600; 310; 30; 20 MG/100ML; MG/100ML; MG/100ML; MG/100ML
INJECTION, SOLUTION INTRAVENOUS CONTINUOUS
Status: CANCELLED | OUTPATIENT
Start: 2021-07-14

## 2021-07-14 RX ORDER — ONDANSETRON 2 MG/ML
4 INJECTION INTRAMUSCULAR; INTRAVENOUS DAILY PRN
Status: DISCONTINUED | OUTPATIENT
Start: 2021-07-14 | End: 2021-07-14

## 2021-07-14 RX ORDER — FAMOTIDINE 10 MG/ML
20 INJECTION INTRAVENOUS ONCE
Status: CANCELLED | OUTPATIENT
Start: 2021-07-14 | End: 2021-07-14

## 2021-07-14 RX ORDER — MORPHINE SULFATE 4 MG/ML
2 INJECTION, SOLUTION INTRAMUSCULAR; INTRAVENOUS EVERY 5 MIN PRN
Status: DISCONTINUED | OUTPATIENT
Start: 2021-07-14 | End: 2021-07-14

## 2021-07-14 RX ORDER — ONDANSETRON 2 MG/ML
INJECTION INTRAMUSCULAR; INTRAVENOUS
Status: DISCONTINUED | OUTPATIENT
Start: 2021-07-14 | End: 2021-07-14

## 2021-07-14 RX ORDER — CIPROFLOXACIN 2 MG/ML
400 INJECTION, SOLUTION INTRAVENOUS
Status: DISCONTINUED | OUTPATIENT
Start: 2021-07-14 | End: 2021-07-16

## 2021-07-14 RX ORDER — MORPHINE SULFATE 4 MG/ML
2 INJECTION, SOLUTION INTRAMUSCULAR; INTRAVENOUS
Status: DISCONTINUED | OUTPATIENT
Start: 2021-07-14 | End: 2021-07-17

## 2021-07-14 RX ORDER — ONDANSETRON 2 MG/ML
4 INJECTION INTRAMUSCULAR; INTRAVENOUS EVERY 4 HOURS PRN
Status: DISCONTINUED | OUTPATIENT
Start: 2021-07-14 | End: 2021-07-17

## 2021-07-14 RX ORDER — MIDAZOLAM HYDROCHLORIDE 1 MG/ML
INJECTION INTRAMUSCULAR; INTRAVENOUS
Status: DISCONTINUED | OUTPATIENT
Start: 2021-07-14 | End: 2021-07-14

## 2021-07-14 RX ORDER — MEPERIDINE HYDROCHLORIDE 50 MG/ML
INJECTION INTRAMUSCULAR; INTRAVENOUS; SUBCUTANEOUS
Status: DISCONTINUED | OUTPATIENT
Start: 2021-07-14 | End: 2021-07-14

## 2021-07-14 RX ORDER — BUPIVACAINE HYDROCHLORIDE AND EPINEPHRINE 2.5; 5 MG/ML; UG/ML
INJECTION, SOLUTION EPIDURAL; INFILTRATION; INTRACAUDAL; PERINEURAL
Status: DISCONTINUED | OUTPATIENT
Start: 2021-07-14 | End: 2021-07-14 | Stop reason: HOSPADM

## 2021-07-14 RX ORDER — ATORVASTATIN CALCIUM 10 MG/1
10 TABLET, FILM COATED ORAL DAILY
Status: DISCONTINUED | OUTPATIENT
Start: 2021-07-15 | End: 2021-07-15

## 2021-07-14 RX ORDER — SODIUM CHLORIDE, SODIUM LACTATE, POTASSIUM CHLORIDE, CALCIUM CHLORIDE 600; 310; 30; 20 MG/100ML; MG/100ML; MG/100ML; MG/100ML
INJECTION, SOLUTION INTRAVENOUS CONTINUOUS PRN
Status: DISCONTINUED | OUTPATIENT
Start: 2021-07-14 | End: 2021-07-14

## 2021-07-14 RX ORDER — LIDOCAINE HYDROCHLORIDE 10 MG/ML
1 INJECTION, SOLUTION EPIDURAL; INFILTRATION; INTRACAUDAL; PERINEURAL ONCE
Status: CANCELLED | OUTPATIENT
Start: 2021-07-14 | End: 2021-07-14

## 2021-07-14 RX ORDER — SODIUM CHLORIDE 9 MG/ML
INJECTION, SOLUTION INTRAVENOUS
Status: COMPLETED | OUTPATIENT
Start: 2021-07-14 | End: 2021-07-14

## 2021-07-14 RX ORDER — ROCURONIUM BROMIDE 10 MG/ML
INJECTION, SOLUTION INTRAVENOUS
Status: DISCONTINUED | OUTPATIENT
Start: 2021-07-14 | End: 2021-07-14

## 2021-07-14 RX ORDER — IBUPROFEN 200 MG
1 TABLET ORAL DAILY
Status: DISCONTINUED | OUTPATIENT
Start: 2021-07-14 | End: 2021-07-18 | Stop reason: HOSPADM

## 2021-07-14 RX ORDER — METRONIDAZOLE 500 MG/100ML
500 INJECTION, SOLUTION INTRAVENOUS
Status: COMPLETED | OUTPATIENT
Start: 2021-07-14 | End: 2021-07-14

## 2021-07-14 RX ORDER — MORPHINE SULFATE 4 MG/ML
4 INJECTION, SOLUTION INTRAMUSCULAR; INTRAVENOUS
Status: DISCONTINUED | OUTPATIENT
Start: 2021-07-14 | End: 2021-07-17

## 2021-07-14 RX ORDER — PROPOFOL 10 MG/ML
VIAL (ML) INTRAVENOUS
Status: DISCONTINUED | OUTPATIENT
Start: 2021-07-14 | End: 2021-07-14

## 2021-07-14 RX ORDER — MORPHINE SULFATE 4 MG/ML
4 INJECTION, SOLUTION INTRAMUSCULAR; INTRAVENOUS
Status: COMPLETED | OUTPATIENT
Start: 2021-07-14 | End: 2021-07-14

## 2021-07-14 RX ORDER — SUCCINYLCHOLINE CHLORIDE 20 MG/ML
INJECTION INTRAMUSCULAR; INTRAVENOUS
Status: DISCONTINUED | OUTPATIENT
Start: 2021-07-14 | End: 2021-07-14

## 2021-07-14 RX ORDER — LISINOPRIL 10 MG/1
20 TABLET ORAL DAILY
Status: DISCONTINUED | OUTPATIENT
Start: 2021-07-15 | End: 2021-07-15

## 2021-07-14 RX ORDER — SODIUM CHLORIDE, SODIUM LACTATE, POTASSIUM CHLORIDE, CALCIUM CHLORIDE 600; 310; 30; 20 MG/100ML; MG/100ML; MG/100ML; MG/100ML
125 INJECTION, SOLUTION INTRAVENOUS CONTINUOUS
Status: DISCONTINUED | OUTPATIENT
Start: 2021-07-14 | End: 2021-07-14

## 2021-07-14 RX ADMIN — DIPHENHYDRAMINE HYDROCHLORIDE 25 MG: 50 INJECTION INTRAMUSCULAR; INTRAVENOUS at 05:07

## 2021-07-14 RX ADMIN — SODIUM CHLORIDE, POTASSIUM CHLORIDE, SODIUM LACTATE AND CALCIUM CHLORIDE: 600; 310; 30; 20 INJECTION, SOLUTION INTRAVENOUS at 05:07

## 2021-07-14 RX ADMIN — MIDAZOLAM HYDROCHLORIDE 2 MG: 1 INJECTION, SOLUTION INTRAMUSCULAR; INTRAVENOUS at 05:07

## 2021-07-14 RX ADMIN — ENOXAPARIN SODIUM 40 MG: 40 INJECTION SUBCUTANEOUS at 08:07

## 2021-07-14 RX ADMIN — CEFTRIAXONE 1 G: 1 INJECTION, SOLUTION INTRAVENOUS at 11:07

## 2021-07-14 RX ADMIN — Medication 1 PATCH: at 07:07

## 2021-07-14 RX ADMIN — MEPERIDINE HYDROCHLORIDE 25 MG: 50 INJECTION INTRAMUSCULAR; INTRAVENOUS; SUBCUTANEOUS at 05:07

## 2021-07-14 RX ADMIN — SODIUM CHLORIDE 100 ML/HR: 0.9 INJECTION, SOLUTION INTRAVENOUS at 12:07

## 2021-07-14 RX ADMIN — METRONIDAZOLE 500 MG: 500 INJECTION, SOLUTION INTRAVENOUS at 12:07

## 2021-07-14 RX ADMIN — SODIUM CHLORIDE 1000 ML: 0.9 INJECTION, SOLUTION INTRAVENOUS at 11:07

## 2021-07-14 RX ADMIN — PROPOFOL 50 MG: 10 INJECTION, EMULSION INTRAVENOUS at 06:07

## 2021-07-14 RX ADMIN — CIPROFLOXACIN 400 MG: 2 INJECTION, SOLUTION INTRAVENOUS at 07:07

## 2021-07-14 RX ADMIN — MORPHINE SULFATE 4 MG: 4 INJECTION, SOLUTION INTRAMUSCULAR; INTRAVENOUS at 08:07

## 2021-07-14 RX ADMIN — ONDANSETRON 8 MG: 2 INJECTION INTRAMUSCULAR; INTRAVENOUS at 06:07

## 2021-07-14 RX ADMIN — SUCCINYLCHOLINE CHLORIDE 100 MG: 20 INJECTION, SOLUTION INTRAMUSCULAR; INTRAVENOUS at 05:07

## 2021-07-14 RX ADMIN — PROPOFOL 150 MG: 10 INJECTION, EMULSION INTRAVENOUS at 05:07

## 2021-07-14 RX ADMIN — MORPHINE SULFATE 4 MG: 4 INJECTION, SOLUTION INTRAMUSCULAR; INTRAVENOUS at 11:07

## 2021-07-14 RX ADMIN — SUGAMMADEX 200 MG: 100 INJECTION, SOLUTION INTRAVENOUS at 06:07

## 2021-07-14 RX ADMIN — DEXAMETHASONE SODIUM PHOSPHATE 8 MG: 4 INJECTION, SOLUTION INTRAMUSCULAR; INTRAVENOUS at 06:07

## 2021-07-14 RX ADMIN — ROCURONIUM BROMIDE 25 MG: 10 INJECTION, SOLUTION INTRAVENOUS at 06:07

## 2021-07-14 RX ADMIN — MORPHINE SULFATE 4 MG: 4 INJECTION, SOLUTION INTRAMUSCULAR; INTRAVENOUS at 01:07

## 2021-07-14 RX ADMIN — CLINDAMYCIN IN 5 PERCENT DEXTROSE 600 MG: 12 INJECTION, SOLUTION INTRAVENOUS at 07:07

## 2021-07-15 LAB
ALBUMIN SERPL BCP-MCNC: 3.4 G/DL (ref 3.5–5.2)
ALP SERPL-CCNC: 79 U/L (ref 55–135)
ALT SERPL W/O P-5'-P-CCNC: 11 U/L (ref 10–44)
ANION GAP SERPL CALC-SCNC: 9 MMOL/L (ref 8–16)
AST SERPL-CCNC: 14 U/L (ref 10–40)
BASOPHILS # BLD AUTO: 0.02 K/UL (ref 0–0.2)
BASOPHILS NFR BLD: 0.1 % (ref 0–1.9)
BILIRUB SERPL-MCNC: 0.3 MG/DL (ref 0.1–1)
BUN SERPL-MCNC: 10 MG/DL (ref 6–20)
CALCIUM SERPL-MCNC: 9 MG/DL (ref 8.7–10.5)
CHLORIDE SERPL-SCNC: 102 MMOL/L (ref 95–110)
CO2 SERPL-SCNC: 24 MMOL/L (ref 23–29)
CREAT SERPL-MCNC: 0.8 MG/DL (ref 0.5–1.4)
DIFFERENTIAL METHOD: ABNORMAL
EOSINOPHIL # BLD AUTO: 0 K/UL (ref 0–0.5)
EOSINOPHIL NFR BLD: 0 % (ref 0–8)
ERYTHROCYTE [DISTWIDTH] IN BLOOD BY AUTOMATED COUNT: 12.1 % (ref 11.5–14.5)
EST. GFR  (AFRICAN AMERICAN): >60 ML/MIN/1.73 M^2
EST. GFR  (NON AFRICAN AMERICAN): >60 ML/MIN/1.73 M^2
GLUCOSE SERPL-MCNC: 167 MG/DL (ref 70–110)
HCT VFR BLD AUTO: 36 % (ref 37–48.5)
HGB BLD-MCNC: 12.5 G/DL (ref 12–16)
IMM GRANULOCYTES # BLD AUTO: 0.09 K/UL (ref 0–0.04)
IMM GRANULOCYTES NFR BLD AUTO: 0.5 % (ref 0–0.5)
LYMPHOCYTES # BLD AUTO: 0.8 K/UL (ref 1–4.8)
LYMPHOCYTES NFR BLD: 4.6 % (ref 18–48)
MCH RBC QN AUTO: 33 PG (ref 27–31)
MCHC RBC AUTO-ENTMCNC: 34.7 G/DL (ref 32–36)
MCV RBC AUTO: 95 FL (ref 82–98)
MONOCYTES # BLD AUTO: 0.6 K/UL (ref 0.3–1)
MONOCYTES NFR BLD: 3.2 % (ref 4–15)
NEUTROPHILS # BLD AUTO: 16.1 K/UL (ref 1.8–7.7)
NEUTROPHILS NFR BLD: 91.6 % (ref 38–73)
NRBC BLD-RTO: 0 /100 WBC
PLATELET # BLD AUTO: 329 K/UL (ref 150–450)
PMV BLD AUTO: 11.3 FL (ref 9.2–12.9)
POTASSIUM SERPL-SCNC: 4.3 MMOL/L (ref 3.5–5.1)
PROT SERPL-MCNC: 6.6 G/DL (ref 6–8.4)
RBC # BLD AUTO: 3.79 M/UL (ref 4–5.4)
SODIUM SERPL-SCNC: 135 MMOL/L (ref 136–145)
WBC # BLD AUTO: 17.53 K/UL (ref 3.9–12.7)

## 2021-07-15 PROCEDURE — 85025 COMPLETE CBC W/AUTO DIFF WBC: CPT | Performed by: SURGERY

## 2021-07-15 PROCEDURE — 25000003 PHARM REV CODE 250: Performed by: SURGERY

## 2021-07-15 PROCEDURE — S4991 NICOTINE PATCH NONLEGEND: HCPCS | Performed by: SURGERY

## 2021-07-15 PROCEDURE — 80053 COMPREHEN METABOLIC PANEL: CPT | Performed by: SURGERY

## 2021-07-15 PROCEDURE — 96372 THER/PROPH/DIAG INJ SC/IM: CPT | Mod: 59

## 2021-07-15 PROCEDURE — 25000003 PHARM REV CODE 250: Performed by: HOSPITALIST

## 2021-07-15 PROCEDURE — 96376 TX/PRO/DX INJ SAME DRUG ADON: CPT

## 2021-07-15 PROCEDURE — G0378 HOSPITAL OBSERVATION PER HR: HCPCS

## 2021-07-15 PROCEDURE — 63600175 PHARM REV CODE 636 W HCPCS: Performed by: SURGERY

## 2021-07-15 PROCEDURE — 36415 COLL VENOUS BLD VENIPUNCTURE: CPT | Performed by: SURGERY

## 2021-07-15 PROCEDURE — 96374 THER/PROPH/DIAG INJ IV PUSH: CPT | Mod: 59

## 2021-07-15 RX ORDER — OXYCODONE HYDROCHLORIDE 5 MG/1
5 TABLET ORAL EVERY 4 HOURS PRN
Status: DISCONTINUED | OUTPATIENT
Start: 2021-07-15 | End: 2021-07-18

## 2021-07-15 RX ORDER — ATORVASTATIN CALCIUM 10 MG/1
10 TABLET, FILM COATED ORAL DAILY
Status: DISCONTINUED | OUTPATIENT
Start: 2021-07-15 | End: 2021-07-18 | Stop reason: HOSPADM

## 2021-07-15 RX ORDER — LISINOPRIL 10 MG/1
20 TABLET ORAL DAILY
Status: DISCONTINUED | OUTPATIENT
Start: 2021-07-15 | End: 2021-07-18 | Stop reason: HOSPADM

## 2021-07-15 RX ADMIN — ATORVASTATIN CALCIUM 10 MG: 10 TABLET, FILM COATED ORAL at 09:07

## 2021-07-15 RX ADMIN — CLINDAMYCIN IN 5 PERCENT DEXTROSE 600 MG: 12 INJECTION, SOLUTION INTRAVENOUS at 07:07

## 2021-07-15 RX ADMIN — OXYCODONE HYDROCHLORIDE 5 MG: 5 TABLET ORAL at 01:07

## 2021-07-15 RX ADMIN — ONDANSETRON 4 MG: 2 INJECTION INTRAMUSCULAR; INTRAVENOUS at 08:07

## 2021-07-15 RX ADMIN — MORPHINE SULFATE 2 MG: 4 INJECTION INTRAVENOUS at 08:07

## 2021-07-15 RX ADMIN — MORPHINE SULFATE 4 MG: 4 INJECTION, SOLUTION INTRAMUSCULAR; INTRAVENOUS at 04:07

## 2021-07-15 RX ADMIN — LISINOPRIL 20 MG: 10 TABLET ORAL at 09:07

## 2021-07-15 RX ADMIN — CIPROFLOXACIN 400 MG: 2 INJECTION, SOLUTION INTRAVENOUS at 07:07

## 2021-07-15 RX ADMIN — ENOXAPARIN SODIUM 40 MG: 40 INJECTION SUBCUTANEOUS at 04:07

## 2021-07-15 RX ADMIN — CIPROFLOXACIN 400 MG: 2 INJECTION, SOLUTION INTRAVENOUS at 08:07

## 2021-07-15 RX ADMIN — Medication 1 PATCH: at 08:07

## 2021-07-15 RX ADMIN — MORPHINE SULFATE 4 MG: 4 INJECTION, SOLUTION INTRAMUSCULAR; INTRAVENOUS at 10:07

## 2021-07-15 RX ADMIN — CLINDAMYCIN IN 5 PERCENT DEXTROSE 600 MG: 12 INJECTION, SOLUTION INTRAVENOUS at 03:07

## 2021-07-15 RX ADMIN — CLINDAMYCIN IN 5 PERCENT DEXTROSE 600 MG: 12 INJECTION, SOLUTION INTRAVENOUS at 12:07

## 2021-07-16 LAB
ALBUMIN SERPL BCP-MCNC: 3.5 G/DL (ref 3.5–5.2)
ALP SERPL-CCNC: 56 U/L (ref 55–135)
ALT SERPL W/O P-5'-P-CCNC: 11 U/L (ref 10–44)
ANION GAP SERPL CALC-SCNC: 11 MMOL/L (ref 8–16)
AST SERPL-CCNC: 15 U/L (ref 10–40)
BASOPHILS # BLD AUTO: 0.07 K/UL (ref 0–0.2)
BASOPHILS NFR BLD: 0.7 % (ref 0–1.9)
BILIRUB SERPL-MCNC: 0.2 MG/DL (ref 0.1–1)
BUN SERPL-MCNC: 8 MG/DL (ref 6–20)
CALCIUM SERPL-MCNC: 8.9 MG/DL (ref 8.7–10.5)
CHLORIDE SERPL-SCNC: 103 MMOL/L (ref 95–110)
CO2 SERPL-SCNC: 22 MMOL/L (ref 23–29)
CREAT SERPL-MCNC: 0.8 MG/DL (ref 0.5–1.4)
DIFFERENTIAL METHOD: ABNORMAL
EOSINOPHIL # BLD AUTO: 0.2 K/UL (ref 0–0.5)
EOSINOPHIL NFR BLD: 2.4 % (ref 0–8)
ERYTHROCYTE [DISTWIDTH] IN BLOOD BY AUTOMATED COUNT: 12.3 % (ref 11.5–14.5)
EST. GFR  (AFRICAN AMERICAN): >60 ML/MIN/1.73 M^2
EST. GFR  (NON AFRICAN AMERICAN): >60 ML/MIN/1.73 M^2
GLUCOSE SERPL-MCNC: 93 MG/DL (ref 70–110)
HCT VFR BLD AUTO: 37.1 % (ref 37–48.5)
HGB BLD-MCNC: 12.6 G/DL (ref 12–16)
IMM GRANULOCYTES # BLD AUTO: 0.05 K/UL (ref 0–0.04)
IMM GRANULOCYTES NFR BLD AUTO: 0.5 % (ref 0–0.5)
LYMPHOCYTES # BLD AUTO: 2.9 K/UL (ref 1–4.8)
LYMPHOCYTES NFR BLD: 30.8 % (ref 18–48)
MCH RBC QN AUTO: 32.6 PG (ref 27–31)
MCHC RBC AUTO-ENTMCNC: 34 G/DL (ref 32–36)
MCV RBC AUTO: 96 FL (ref 82–98)
MONOCYTES # BLD AUTO: 0.8 K/UL (ref 0.3–1)
MONOCYTES NFR BLD: 8.6 % (ref 4–15)
NEUTROPHILS # BLD AUTO: 5.4 K/UL (ref 1.8–7.7)
NEUTROPHILS NFR BLD: 57 % (ref 38–73)
NRBC BLD-RTO: 0 /100 WBC
PLATELET # BLD AUTO: 349 K/UL (ref 150–450)
PMV BLD AUTO: 10.8 FL (ref 9.2–12.9)
POTASSIUM SERPL-SCNC: 3.8 MMOL/L (ref 3.5–5.1)
PROT SERPL-MCNC: 6.7 G/DL (ref 6–8.4)
RBC # BLD AUTO: 3.86 M/UL (ref 4–5.4)
SODIUM SERPL-SCNC: 136 MMOL/L (ref 136–145)
VANCOMYCIN TROUGH SERPL-MCNC: <1.1 UG/ML (ref 10–22)
WBC # BLD AUTO: 9.49 K/UL (ref 3.9–12.7)

## 2021-07-16 PROCEDURE — 25000003 PHARM REV CODE 250: Performed by: SURGERY

## 2021-07-16 PROCEDURE — 25000003 PHARM REV CODE 250: Performed by: HOSPITALIST

## 2021-07-16 PROCEDURE — 85025 COMPLETE CBC W/AUTO DIFF WBC: CPT | Performed by: HOSPITALIST

## 2021-07-16 PROCEDURE — 63700000 PHARM REV CODE 250 ALT 637 W/O HCPCS: Performed by: HOSPITALIST

## 2021-07-16 PROCEDURE — 96372 THER/PROPH/DIAG INJ SC/IM: CPT | Mod: 59

## 2021-07-16 PROCEDURE — 96376 TX/PRO/DX INJ SAME DRUG ADON: CPT

## 2021-07-16 PROCEDURE — 63600175 PHARM REV CODE 636 W HCPCS: Performed by: SURGERY

## 2021-07-16 PROCEDURE — G0378 HOSPITAL OBSERVATION PER HR: HCPCS

## 2021-07-16 PROCEDURE — 80202 ASSAY OF VANCOMYCIN: CPT | Performed by: SURGERY

## 2021-07-16 PROCEDURE — 36415 COLL VENOUS BLD VENIPUNCTURE: CPT | Performed by: SURGERY

## 2021-07-16 PROCEDURE — S4991 NICOTINE PATCH NONLEGEND: HCPCS | Performed by: SURGERY

## 2021-07-16 PROCEDURE — 80053 COMPREHEN METABOLIC PANEL: CPT | Performed by: HOSPITALIST

## 2021-07-16 RX ORDER — CIPROFLOXACIN 250 MG/1
500 TABLET, FILM COATED ORAL EVERY 12 HOURS
Status: DISCONTINUED | OUTPATIENT
Start: 2021-07-16 | End: 2021-07-18 | Stop reason: HOSPADM

## 2021-07-16 RX ORDER — CLINDAMYCIN HYDROCHLORIDE 150 MG/1
300 CAPSULE ORAL EVERY 8 HOURS
Status: DISCONTINUED | OUTPATIENT
Start: 2021-07-16 | End: 2021-07-18 | Stop reason: HOSPADM

## 2021-07-16 RX ORDER — DOCUSATE SODIUM 100 MG/1
100 CAPSULE, LIQUID FILLED ORAL DAILY PRN
Status: DISCONTINUED | OUTPATIENT
Start: 2021-07-16 | End: 2021-07-18 | Stop reason: HOSPADM

## 2021-07-16 RX ORDER — DOCUSATE SODIUM 100 MG/1
100 CAPSULE, LIQUID FILLED ORAL DAILY
Status: DISCONTINUED | OUTPATIENT
Start: 2021-07-16 | End: 2021-07-16

## 2021-07-16 RX ORDER — MICONAZOLE NITRATE 2 %
1 CREAM WITH APPLICATOR VAGINAL NIGHTLY
Status: DISCONTINUED | OUTPATIENT
Start: 2021-07-16 | End: 2021-07-16

## 2021-07-16 RX ADMIN — FLUCONAZOLE 150 MG: 50 TABLET ORAL at 10:07

## 2021-07-16 RX ADMIN — OXYCODONE HYDROCHLORIDE 5 MG: 5 TABLET ORAL at 09:07

## 2021-07-16 RX ADMIN — Medication 1 PATCH: at 09:07

## 2021-07-16 RX ADMIN — OXYCODONE HYDROCHLORIDE 5 MG: 5 TABLET ORAL at 08:07

## 2021-07-16 RX ADMIN — DOCUSATE SODIUM 100 MG: 100 CAPSULE, LIQUID FILLED ORAL at 08:07

## 2021-07-16 RX ADMIN — LISINOPRIL 20 MG: 10 TABLET ORAL at 08:07

## 2021-07-16 RX ADMIN — ONDANSETRON 4 MG: 2 INJECTION INTRAMUSCULAR; INTRAVENOUS at 01:07

## 2021-07-16 RX ADMIN — CIPROFLOXACIN 400 MG: 2 INJECTION, SOLUTION INTRAVENOUS at 07:07

## 2021-07-16 RX ADMIN — DOCUSATE SODIUM 100 MG: 100 CAPSULE, LIQUID FILLED ORAL at 07:07

## 2021-07-16 RX ADMIN — ATORVASTATIN CALCIUM 10 MG: 10 TABLET, FILM COATED ORAL at 08:07

## 2021-07-16 RX ADMIN — CLINDAMYCIN HYDROCHLORIDE 300 MG: 150 CAPSULE ORAL at 02:07

## 2021-07-16 RX ADMIN — MORPHINE SULFATE 2 MG: 4 INJECTION INTRAVENOUS at 12:07

## 2021-07-16 RX ADMIN — OXYCODONE HYDROCHLORIDE 5 MG: 5 TABLET ORAL at 04:07

## 2021-07-16 RX ADMIN — CIPROFLOXACIN HYDROCHLORIDE 500 MG: 250 TABLET, FILM COATED ORAL at 08:07

## 2021-07-16 RX ADMIN — ENOXAPARIN SODIUM 40 MG: 40 INJECTION SUBCUTANEOUS at 05:07

## 2021-07-16 RX ADMIN — CLINDAMYCIN IN 5 PERCENT DEXTROSE 600 MG: 12 INJECTION, SOLUTION INTRAVENOUS at 02:07

## 2021-07-16 RX ADMIN — OXYCODONE HYDROCHLORIDE 5 MG: 5 TABLET ORAL at 02:07

## 2021-07-16 RX ADMIN — MORPHINE SULFATE 4 MG: 4 INJECTION, SOLUTION INTRAMUSCULAR; INTRAVENOUS at 06:07

## 2021-07-16 RX ADMIN — CLINDAMYCIN HYDROCHLORIDE 300 MG: 150 CAPSULE ORAL at 09:07

## 2021-07-17 PROBLEM — K59.00 CONSTIPATION: Status: ACTIVE | Noted: 2021-07-17

## 2021-07-17 LAB — BACTERIA SPEC AEROBE CULT: NORMAL

## 2021-07-17 PROCEDURE — 63600175 PHARM REV CODE 636 W HCPCS: Performed by: SURGERY

## 2021-07-17 PROCEDURE — 25000003 PHARM REV CODE 250: Performed by: HOSPITALIST

## 2021-07-17 PROCEDURE — G0378 HOSPITAL OBSERVATION PER HR: HCPCS

## 2021-07-17 PROCEDURE — S4991 NICOTINE PATCH NONLEGEND: HCPCS | Performed by: SURGERY

## 2021-07-17 PROCEDURE — 25000003 PHARM REV CODE 250: Performed by: SURGERY

## 2021-07-17 PROCEDURE — 96372 THER/PROPH/DIAG INJ SC/IM: CPT

## 2021-07-17 RX ORDER — PROMETHAZINE HYDROCHLORIDE 12.5 MG/1
12.5 TABLET ORAL EVERY 6 HOURS PRN
Status: DISCONTINUED | OUTPATIENT
Start: 2021-07-17 | End: 2021-07-18 | Stop reason: HOSPADM

## 2021-07-17 RX ORDER — SYRING-NEEDL,DISP,INSUL,0.3 ML 29 G X1/2"
296 SYRINGE, EMPTY DISPOSABLE MISCELLANEOUS ONCE
Status: COMPLETED | OUTPATIENT
Start: 2021-07-17 | End: 2021-07-18

## 2021-07-17 RX ORDER — ADHESIVE BANDAGE
30 BANDAGE TOPICAL ONCE
Status: COMPLETED | OUTPATIENT
Start: 2021-07-17 | End: 2021-07-17

## 2021-07-17 RX ADMIN — OXYCODONE HYDROCHLORIDE 5 MG: 5 TABLET ORAL at 06:07

## 2021-07-17 RX ADMIN — CLINDAMYCIN HYDROCHLORIDE 300 MG: 150 CAPSULE ORAL at 10:07

## 2021-07-17 RX ADMIN — DOCUSATE SODIUM 100 MG: 100 CAPSULE, LIQUID FILLED ORAL at 10:07

## 2021-07-17 RX ADMIN — CIPROFLOXACIN HYDROCHLORIDE 500 MG: 250 TABLET, FILM COATED ORAL at 08:07

## 2021-07-17 RX ADMIN — Medication 1 PATCH: at 08:07

## 2021-07-17 RX ADMIN — LISINOPRIL 20 MG: 10 TABLET ORAL at 08:07

## 2021-07-17 RX ADMIN — CLINDAMYCIN HYDROCHLORIDE 300 MG: 150 CAPSULE ORAL at 05:07

## 2021-07-17 RX ADMIN — OXYCODONE HYDROCHLORIDE 5 MG: 5 TABLET ORAL at 10:07

## 2021-07-17 RX ADMIN — OXYCODONE HYDROCHLORIDE 5 MG: 5 TABLET ORAL at 08:07

## 2021-07-17 RX ADMIN — ENOXAPARIN SODIUM 40 MG: 40 INJECTION SUBCUTANEOUS at 05:07

## 2021-07-17 RX ADMIN — ATORVASTATIN CALCIUM 10 MG: 10 TABLET, FILM COATED ORAL at 08:07

## 2021-07-17 RX ADMIN — PROMETHAZINE HYDROCHLORIDE 12.5 MG: 12.5 TABLET ORAL at 08:07

## 2021-07-17 RX ADMIN — MAGNESIUM HYDROXIDE 2400 MG: 400 SUSPENSION ORAL at 11:07

## 2021-07-17 RX ADMIN — CLINDAMYCIN HYDROCHLORIDE 300 MG: 150 CAPSULE ORAL at 02:07

## 2021-07-17 RX ADMIN — OXYCODONE HYDROCHLORIDE 5 MG: 5 TABLET ORAL at 02:07

## 2021-07-18 VITALS
WEIGHT: 173.31 LBS | RESPIRATION RATE: 18 BRPM | TEMPERATURE: 98 F | OXYGEN SATURATION: 98 % | HEART RATE: 59 BPM | DIASTOLIC BLOOD PRESSURE: 59 MMHG | BODY MASS INDEX: 28.87 KG/M2 | SYSTOLIC BLOOD PRESSURE: 107 MMHG | HEIGHT: 65 IN

## 2021-07-18 PROBLEM — K59.00 CONSTIPATION: Status: RESOLVED | Noted: 2021-07-17 | Resolved: 2021-07-18

## 2021-07-18 PROCEDURE — G0378 HOSPITAL OBSERVATION PER HR: HCPCS

## 2021-07-18 PROCEDURE — 25000003 PHARM REV CODE 250: Performed by: HOSPITALIST

## 2021-07-18 RX ORDER — CLINDAMYCIN HYDROCHLORIDE 300 MG/1
300 CAPSULE ORAL EVERY 8 HOURS
Qty: 15 CAPSULE | Refills: 0 | Status: SHIPPED | OUTPATIENT
Start: 2021-07-18 | End: 2021-07-23

## 2021-07-18 RX ORDER — NAPROXEN 375 MG/1
375 TABLET ORAL 2 TIMES DAILY
Qty: 110 TABLET | Refills: 0 | Status: SHIPPED | OUTPATIENT
Start: 2021-07-18 | End: 2022-04-21 | Stop reason: ALTCHOICE

## 2021-07-18 RX ORDER — ACETAMINOPHEN 500 MG
1000 TABLET ORAL EVERY 8 HOURS PRN
Status: DISCONTINUED | OUTPATIENT
Start: 2021-07-18 | End: 2021-07-18 | Stop reason: HOSPADM

## 2021-07-18 RX ADMIN — OXYCODONE HYDROCHLORIDE 5 MG: 5 TABLET ORAL at 02:07

## 2021-07-18 RX ADMIN — CLINDAMYCIN HYDROCHLORIDE 300 MG: 150 CAPSULE ORAL at 06:07

## 2021-07-18 RX ADMIN — MAGNESIUM CITRATE 296 ML: 1.75 LIQUID ORAL at 07:07

## 2021-07-18 RX ADMIN — CIPROFLOXACIN HYDROCHLORIDE 500 MG: 250 TABLET, FILM COATED ORAL at 07:07

## 2021-07-18 RX ADMIN — PROMETHAZINE HYDROCHLORIDE 12.5 MG: 12.5 TABLET ORAL at 07:07

## 2021-07-19 ENCOUNTER — TELEPHONE (OUTPATIENT)
Dept: SURGERY | Facility: CLINIC | Age: 41
End: 2021-07-19

## 2021-07-19 DIAGNOSIS — G89.18 POST-OPERATIVE PAIN: Primary | ICD-10-CM

## 2021-07-19 LAB
BACTERIA BLD CULT: NORMAL
BACTERIA SPEC ANAEROBE CULT: ABNORMAL
BACTERIA SPEC ANAEROBE CULT: ABNORMAL

## 2021-07-19 RX ORDER — OXYCODONE AND ACETAMINOPHEN 5; 325 MG/1; MG/1
1 TABLET ORAL EVERY 4 HOURS PRN
Qty: 30 TABLET | Refills: 0 | Status: SHIPPED | OUTPATIENT
Start: 2021-07-19 | End: 2021-07-29

## 2021-07-20 LAB — BACTERIA BLD CULT: NORMAL

## 2021-08-04 ENCOUNTER — OFFICE VISIT (OUTPATIENT)
Dept: SURGERY | Facility: CLINIC | Age: 41
End: 2021-08-04

## 2021-08-04 VITALS
DIASTOLIC BLOOD PRESSURE: 75 MMHG | HEART RATE: 79 BPM | HEIGHT: 65 IN | BODY MASS INDEX: 29.49 KG/M2 | TEMPERATURE: 98 F | WEIGHT: 177 LBS | RESPIRATION RATE: 17 BRPM | OXYGEN SATURATION: 97 % | SYSTOLIC BLOOD PRESSURE: 118 MMHG

## 2021-08-04 DIAGNOSIS — K60.4 RECTAL FISTULA: Primary | ICD-10-CM

## 2021-08-04 PROCEDURE — 99024 POSTOP FOLLOW-UP VISIT: CPT | Mod: S$GLB,,, | Performed by: SURGERY

## 2021-08-04 PROCEDURE — 99024 PR POST-OP FOLLOW-UP VISIT: ICD-10-PCS | Mod: S$GLB,,, | Performed by: SURGERY

## 2021-08-23 ENCOUNTER — TELEPHONE (OUTPATIENT)
Dept: SURGERY | Facility: CLINIC | Age: 41
End: 2021-08-23

## 2022-01-08 ENCOUNTER — HOSPITAL ENCOUNTER (EMERGENCY)
Facility: HOSPITAL | Age: 42
Discharge: HOME | End: 2022-01-08
Attending: INTERNAL MEDICINE

## 2022-01-08 VITALS
BODY MASS INDEX: 24.99 KG/M2 | TEMPERATURE: 98 F | HEIGHT: 65 IN | DIASTOLIC BLOOD PRESSURE: 95 MMHG | RESPIRATION RATE: 20 BRPM | SYSTOLIC BLOOD PRESSURE: 139 MMHG | OXYGEN SATURATION: 99 % | HEART RATE: 95 BPM | WEIGHT: 150 LBS

## 2022-01-08 DIAGNOSIS — F12.10 CANNABIS ABUSE: ICD-10-CM

## 2022-01-08 DIAGNOSIS — R42 DIZZINESS: Primary | ICD-10-CM

## 2022-01-08 DIAGNOSIS — I10 HYPERTENSION, UNSPECIFIED TYPE: ICD-10-CM

## 2022-01-08 DIAGNOSIS — R07.9 CHEST PAIN: ICD-10-CM

## 2022-01-08 DIAGNOSIS — R07.9 CHEST PAIN, UNSPECIFIED TYPE: ICD-10-CM

## 2022-01-08 LAB
AMPHET+METHAMPHET UR QL: NEGATIVE
ANION GAP SERPL CALC-SCNC: 12 MMOL/L (ref 8–16)
B-HCG UR QL: NEGATIVE
BARBITURATES UR QL SCN>200 NG/ML: NEGATIVE
BASOPHILS # BLD AUTO: 0.07 K/UL (ref 0–0.2)
BASOPHILS NFR BLD: 0.8 % (ref 0–1.9)
BENZODIAZ UR QL SCN>200 NG/ML: NEGATIVE
BILIRUB UR QL STRIP: NEGATIVE
BUN SERPL-MCNC: 9 MG/DL (ref 6–20)
BZE UR QL SCN: NEGATIVE
CALCIUM SERPL-MCNC: 9.4 MG/DL (ref 8.7–10.5)
CANNABINOIDS UR QL SCN: ABNORMAL
CHLORIDE SERPL-SCNC: 103 MMOL/L (ref 95–110)
CLARITY UR: CLEAR
CO2 SERPL-SCNC: 21 MMOL/L (ref 23–29)
COLOR UR: YELLOW
CREAT SERPL-MCNC: 0.8 MG/DL (ref 0.5–1.4)
CREAT UR-MCNC: 78 MG/DL (ref 15–325)
DIFFERENTIAL METHOD: ABNORMAL
EOSINOPHIL # BLD AUTO: 0.3 K/UL (ref 0–0.5)
EOSINOPHIL NFR BLD: 3.5 % (ref 0–8)
ERYTHROCYTE [DISTWIDTH] IN BLOOD BY AUTOMATED COUNT: 12.6 % (ref 11.5–14.5)
EST. GFR  (AFRICAN AMERICAN): >60 ML/MIN/1.73 M^2
EST. GFR  (NON AFRICAN AMERICAN): >60 ML/MIN/1.73 M^2
GLUCOSE SERPL-MCNC: 79 MG/DL (ref 70–110)
GLUCOSE UR QL STRIP: NEGATIVE
HCT VFR BLD AUTO: 41.3 % (ref 37–48.5)
HGB BLD-MCNC: 14 G/DL (ref 12–16)
HGB UR QL STRIP: ABNORMAL
IMM GRANULOCYTES # BLD AUTO: 0.03 K/UL (ref 0–0.04)
IMM GRANULOCYTES NFR BLD AUTO: 0.3 % (ref 0–0.5)
KETONES UR QL STRIP: NEGATIVE
LEUKOCYTE ESTERASE UR QL STRIP: NEGATIVE
LYMPHOCYTES # BLD AUTO: 1.8 K/UL (ref 1–4.8)
LYMPHOCYTES NFR BLD: 20.2 % (ref 18–48)
MCH RBC QN AUTO: 32.2 PG (ref 27–31)
MCHC RBC AUTO-ENTMCNC: 33.9 G/DL (ref 32–36)
MCV RBC AUTO: 95 FL (ref 82–98)
METHADONE UR QL SCN>300 NG/ML: NEGATIVE
MONOCYTES # BLD AUTO: 0.7 K/UL (ref 0.3–1)
MONOCYTES NFR BLD: 7.6 % (ref 4–15)
NEUTROPHILS # BLD AUTO: 5.9 K/UL (ref 1.8–7.7)
NEUTROPHILS NFR BLD: 67.6 % (ref 38–73)
NITRITE UR QL STRIP: NEGATIVE
NRBC BLD-RTO: 0 /100 WBC
OPIATES UR QL SCN: NEGATIVE
PCP UR QL SCN>25 NG/ML: NEGATIVE
PH UR STRIP: 7 [PH] (ref 5–8)
PLATELET # BLD AUTO: 341 K/UL (ref 150–450)
PMV BLD AUTO: 10.1 FL (ref 9.2–12.9)
POTASSIUM SERPL-SCNC: 4.5 MMOL/L (ref 3.5–5.1)
PROT UR QL STRIP: NEGATIVE
RBC # BLD AUTO: 4.35 M/UL (ref 4–5.4)
SODIUM SERPL-SCNC: 136 MMOL/L (ref 136–145)
SP GR UR STRIP: 1.02 (ref 1–1.03)
TOXICOLOGY INFORMATION: ABNORMAL
TROPONIN I SERPL DL<=0.01 NG/ML-MCNC: <0.006 NG/ML (ref 0–0.03)
URN SPEC COLLECT METH UR: ABNORMAL
UROBILINOGEN UR STRIP-ACNC: NEGATIVE EU/DL
WBC # BLD AUTO: 8.8 K/UL (ref 3.9–12.7)

## 2022-01-08 PROCEDURE — 36000 PLACE NEEDLE IN VEIN: CPT

## 2022-01-08 PROCEDURE — 84484 ASSAY OF TROPONIN QUANT: CPT | Performed by: INTERNAL MEDICINE

## 2022-01-08 PROCEDURE — 71045 X-RAY EXAM CHEST 1 VIEW: CPT | Mod: TC,FY

## 2022-01-08 PROCEDURE — 93005 ELECTROCARDIOGRAM TRACING: CPT

## 2022-01-08 PROCEDURE — 71045 XR CHEST AP PORTABLE: ICD-10-PCS | Mod: 26,,, | Performed by: RADIOLOGY

## 2022-01-08 PROCEDURE — 93010 EKG 12-LEAD: ICD-10-PCS | Mod: ,,, | Performed by: INTERNAL MEDICINE

## 2022-01-08 PROCEDURE — 70450 CT HEAD WITHOUT CONTRAST: ICD-10-PCS | Mod: 26,,, | Performed by: RADIOLOGY

## 2022-01-08 PROCEDURE — 93010 ELECTROCARDIOGRAM REPORT: CPT | Mod: ,,, | Performed by: INTERNAL MEDICINE

## 2022-01-08 PROCEDURE — 70450 CT HEAD/BRAIN W/O DYE: CPT | Mod: 26,,, | Performed by: RADIOLOGY

## 2022-01-08 PROCEDURE — 80048 BASIC METABOLIC PNL TOTAL CA: CPT | Performed by: INTERNAL MEDICINE

## 2022-01-08 PROCEDURE — 99285 EMERGENCY DEPT VISIT HI MDM: CPT | Mod: 25

## 2022-01-08 PROCEDURE — 70450 CT HEAD/BRAIN W/O DYE: CPT | Mod: TC

## 2022-01-08 PROCEDURE — 81025 URINE PREGNANCY TEST: CPT | Performed by: INTERNAL MEDICINE

## 2022-01-08 PROCEDURE — 71045 X-RAY EXAM CHEST 1 VIEW: CPT | Mod: 26,,, | Performed by: RADIOLOGY

## 2022-01-08 PROCEDURE — 85025 COMPLETE CBC W/AUTO DIFF WBC: CPT | Performed by: INTERNAL MEDICINE

## 2022-01-08 PROCEDURE — 80307 DRUG TEST PRSMV CHEM ANLYZR: CPT | Performed by: INTERNAL MEDICINE

## 2022-01-08 PROCEDURE — 81003 URINALYSIS AUTO W/O SCOPE: CPT | Mod: 59 | Performed by: INTERNAL MEDICINE

## 2022-01-08 RX ORDER — LISINOPRIL 20 MG/1
20 TABLET ORAL DAILY
Qty: 15 TABLET | Refills: 0 | Status: SHIPPED | OUTPATIENT
Start: 2022-01-08 | End: 2022-01-23

## 2022-01-08 NOTE — ED PROVIDER NOTES
Encounter Date: 1/8/2022       History     Chief Complaint   Patient presents with    Dizziness    Chest Pain     Pt reports intermittent chest pain and dizziness since am. Pt reports is currently out of BP meds     Patient states that she has had dizziness and chest pain off and on for several days.  The patient said that she ran out of her blood pressure medicines 2 weeks ago and has been cutting them in half to keep them going until Monday.  There is no history of any fever chills or neurological changes.        Review of patient's allergies indicates:   Allergen Reactions    Pcn [penicillins] Anaphylaxis    Shellfish containing products Anaphylaxis    Iodine and iodide containing products Itching and Rash     Past Medical History:   Diagnosis Date    Anxiety     Hyperlipidemia     Hypertension     Migraine headache      Past Surgical History:   Procedure Laterality Date    ANOSCOPY N/A 2/12/2020    Procedure: ANOSCOPY;  Surgeon: Musa Cannon MD;  Location: [facility] OR;  Service: General;  Laterality: N/A;    INCISION AND DRAINAGE OF BUTTOCK N/A 7/14/2021    Procedure: INCISION AND DRAINAGE, BUTTOCK;  Surgeon: Jayjay Swain MD;  Location: [facility] OR;  Service: General;  Laterality: N/A;  JESSEE-RECTAL    INCISION OF PERIRECTAL ABSCESS Left 2/12/2020    Procedure: INCISION, ABSCESS, PERIRECTAL;  Surgeon: Musa Cannon MD;  Location: [facility] OR;  Service: General;  Laterality: Left;     No family history on file.  Social History     Tobacco Use    Smoking status: Current Every Day Smoker     Packs/day: 1.00     Years: 20.00     Pack years: 20.00     Types: Cigarettes    Smokeless tobacco: Never Used   Substance Use Topics    Alcohol use: Yes     Alcohol/week: 7.0 standard drinks     Types: 7 Glasses of wine per week    Drug use: Yes     Frequency: 3.0 times per week     Types: Marijuana     Review of Systems   Constitutional: Negative for fever.   HENT: Negative for sore throat.    Respiratory:  Negative for shortness of breath.    Cardiovascular: Negative for chest pain.   Gastrointestinal: Negative for nausea.   Genitourinary: Negative for dysuria.   Musculoskeletal: Negative for back pain.   Skin: Negative for rash.   Neurological: Negative for weakness.   Hematological: Does not bruise/bleed easily.       Physical Exam     Initial Vitals [01/08/22 0859]   BP Pulse Resp Temp SpO2   (!) 139/95 95 20 98.2 °F (36.8 °C) 99 %      MAP       --         Physical Exam    Vitals reviewed.  Constitutional: She appears well-developed.   Eyes: Pupils are equal, round, and reactive to light.   Neck:   Normal range of motion.  Cardiovascular: Normal rate.   Pulmonary/Chest: Breath sounds normal.   Abdominal: Abdomen is soft.   Musculoskeletal:         General: Normal range of motion.      Cervical back: Normal range of motion.     Neurological: She is alert. She has normal strength and normal reflexes. No cranial nerve deficit or sensory deficit. GCS eye subscore is 4. GCS verbal subscore is 5. GCS motor subscore is 6.   Skin: Skin is warm.   Psychiatric: She has a normal mood and affect.         ED Course   Procedures  Labs Reviewed   BASIC METABOLIC PANEL - Abnormal; Notable for the following components:       Result Value    CO2 21 (*)     All other components within normal limits   CBC W/ AUTO DIFFERENTIAL - Abnormal; Notable for the following components:    MCH 32.2 (*)     All other components within normal limits   DRUG SCREEN PANEL, URINE EMERGENCY - Abnormal; Notable for the following components:    THC Presumptive Positive (*)     All other components within normal limits    Narrative:     Preferred Collection Type->Urine, Clean Catch  Specimen Source->Urine   URINALYSIS, REFLEX TO URINE CULTURE - Abnormal; Notable for the following components:    Occult Blood UA Trace (*)     All other components within normal limits    Narrative:     Preferred Collection Type->Urine, Clean Catch  Specimen Source->Urine    TROPONIN I   PREGNANCY TEST, URINE RAPID    Narrative:     Specimen Source->Urine        ECG Results          EKG 12-lead (Preliminary result)  Result time 01/08/22 09:32:52    ED Interpretation by Jonh Cr MD (01/08/22 09:32:52, [facility] Emergency Dept, Emergency Medicine)    NORMAL SINUS RHYTHM WITH RATE OF 83 AND NO ACUTE ISCHEMIC CHANGES                            Imaging Results          CT Head Without Contrast (Final result)  Result time 01/08/22 10:06:39    Final result by Gene Candelaria MD (01/08/22 10:06:39)                 Impression:      No acute intracranial abnormality.      Electronically signed by: Gene Candelaria  Date:    01/08/2022  Time:    10:06             Narrative:    EXAMINATION:  CT HEAD WITHOUT CONTRAST    CLINICAL HISTORY:  Dizziness, persistent/recurrent, cardiac or vascular cause suspected;    TECHNIQUE:  Low dose axial images were obtained through the head.  Coronal and sagittal reformations were also performed. Contrast was not administered.    COMPARISON:  CT 08/11/2009.    FINDINGS:  There is no acute hemorrhage or infarction.  There is a normal pattern of gray-white matter differentiation.    No extra-axial fluid collections.  Ventricles are normal in size, shape and configuration.  The basal cisterns are patent.    The imaged paranasal sinuses and ethmoid air cells are well aerated.    The mastoid air cells and middle ears are normally pneumatized.                               X-Ray Chest AP Portable (Final result)  Result time 01/08/22 09:54:22    Final result by Gene Candelaria MD (01/08/22 09:54:22)                 Impression:      No acute abnormality.      Electronically signed by: Gene Candelaria  Date:    01/08/2022  Time:    09:54             Narrative:    EXAMINATION:  XR CHEST AP PORTABLE    CLINICAL HISTORY:  chest pain;.    TECHNIQUE:  Single frontal portable view of the chest was performed.    COMPARISON:  06/18/2015.    FINDINGS:  Support devices:  None    The lungs are clear, with normal appearance of pulmonary vasculature and no pleural effusion or pneumothorax.    The cardiac silhouette is normal in size. The hilar and mediastinal contours are unremarkable.    Bones are intact.                                 Medications - No data to display              ED Course as of 01/08/22 1139   Sat Jan 08, 2022   0932 EKG 12-lead [PW]   0959 Complete Blood Count (CBC)(!) [PW]   1010 Urinalysis, Reflex to Urine Culture Urine, Clean Catch(!) [PW]   1010 CT Head Without Contrast [PW]   1010 X-Ray Chest AP Portable [PW]   1023 Drug screen panel, emergency(!) [PW]   1023 Marijuana (THC) Metabolite(!): Presumptive Positive [PW]   1055 Preg Test, Ur: Negative [PW]   1109 Troponin I: <0.006 [PW]   1135 PATIENT ASYMPTOMATIC [PW]   1138 STABLE   [PW]      ED Course User Index  [PW] Jonh Cr MD             Clinical Impression:   Final diagnoses:  [R07.9] Chest pain  [R42] Dizziness (Primary)  [R07.9] Chest pain, unspecified type  [I10] Hypertension, unspecified type  [F12.10] Cannabis abuse          ED Disposition Condition    Discharge Stable        ED Prescriptions     Medication Sig Dispense Start Date End Date Auth. Provider    lisinopriL (PRINIVIL,ZESTRIL) 20 MG tablet Take 1 tablet (20 mg total) by mouth once daily. for 15 days 15 tablet 1/8/2022 1/23/2022 Jonh Cr MD        Follow-up Information     Follow up With Specialties Details Why Contact Info    PRIMARY CARE PROVIDER ON MONDAY  In 2 days             Jonh Cr MD  01/08/22 1100       Jonh Cr MD  01/08/22 1111       Jonh Cr MD  01/08/22 1135       Jonh Cr MD  01/08/22 1135       Jonh Cr MD  01/08/22 1139

## 2022-02-04 ENCOUNTER — HOSPITAL ENCOUNTER (OUTPATIENT)
Dept: RADIOLOGY | Facility: HOSPITAL | Age: 42
Discharge: HOME | End: 2022-02-04

## 2022-02-04 VITALS — WEIGHT: 150 LBS | HEIGHT: 65 IN | BODY MASS INDEX: 24.99 KG/M2

## 2022-02-04 DIAGNOSIS — N63.20 UNSPECIFIED LUMP IN THE LEFT BREAST, UNSPECIFIED QUADRANT: ICD-10-CM

## 2022-02-04 PROCEDURE — 76642 ULTRASOUND BREAST LIMITED: CPT | Mod: TC,50

## 2022-02-04 PROCEDURE — 76642 ULTRASOUND BREAST LIMITED: CPT | Mod: 26,50,, | Performed by: RADIOLOGY

## 2022-02-04 PROCEDURE — 77066 MAMMO DIGITAL DIAGNOSTIC BILAT WITH TOMO: ICD-10-PCS | Mod: 26,,, | Performed by: RADIOLOGY

## 2022-02-04 PROCEDURE — 76641 ULTRASOUND BREAST COMPLETE: CPT | Mod: TC,LT

## 2022-02-04 PROCEDURE — 77062 MAMMO DIGITAL DIAGNOSTIC BILAT WITH TOMO: ICD-10-PCS | Mod: 26,,, | Performed by: RADIOLOGY

## 2022-02-04 PROCEDURE — 77066 DX MAMMO INCL CAD BI: CPT | Mod: 26,,, | Performed by: RADIOLOGY

## 2022-02-04 PROCEDURE — 76642 US BREAST BILATERAL LIMITED: ICD-10-PCS | Mod: 26,50,, | Performed by: RADIOLOGY

## 2022-02-04 PROCEDURE — 77062 BREAST TOMOSYNTHESIS BI: CPT | Mod: TC

## 2022-02-04 PROCEDURE — 77062 BREAST TOMOSYNTHESIS BI: CPT | Mod: 26,,, | Performed by: RADIOLOGY

## 2022-02-22 ENCOUNTER — HOSPITAL ENCOUNTER (OUTPATIENT)
Dept: RADIOLOGY | Facility: HOSPITAL | Age: 42
Discharge: HOME | End: 2022-02-22

## 2022-02-22 DIAGNOSIS — R92.8 ABNORMAL ULTRASOUND OF BREAST: ICD-10-CM

## 2022-02-22 DIAGNOSIS — N63.11 MASS OF UPPER OUTER QUADRANT OF RIGHT BREAST: ICD-10-CM

## 2022-02-22 DIAGNOSIS — Z98.890 STATUS POST BREAST BIOPSY: ICD-10-CM

## 2022-02-22 PROCEDURE — 19083 US BREAST BIOPSY WITH IMAGING 1ST SITE RIGHT: ICD-10-PCS | Mod: RT,,, | Performed by: RADIOLOGY

## 2022-02-22 PROCEDURE — 88305 TISSUE EXAM BY PATHOLOGIST: CPT | Performed by: PATHOLOGY

## 2022-02-22 PROCEDURE — 19083 BX BREAST 1ST LESION US IMAG: CPT | Mod: RT,,, | Performed by: RADIOLOGY

## 2022-02-22 PROCEDURE — A4550 SURGICAL TRAYS: HCPCS

## 2022-02-22 PROCEDURE — 88305 TISSUE EXAM BY PATHOLOGIST: CPT | Mod: 26,,, | Performed by: PATHOLOGY

## 2022-02-22 PROCEDURE — 88305 TISSUE EXAM BY PATHOLOGIST: ICD-10-PCS | Mod: 26,,, | Performed by: PATHOLOGY

## 2022-02-24 LAB
FINAL PATHOLOGIC DIAGNOSIS: NORMAL
GROSS: NORMAL
Lab: NORMAL

## 2022-04-21 ENCOUNTER — HOSPITAL ENCOUNTER (EMERGENCY)
Facility: HOSPITAL | Age: 42
Discharge: HOME | End: 2022-04-21
Attending: EMERGENCY MEDICINE

## 2022-04-21 VITALS
TEMPERATURE: 98 F | RESPIRATION RATE: 15 BRPM | HEIGHT: 65 IN | HEART RATE: 68 BPM | SYSTOLIC BLOOD PRESSURE: 116 MMHG | DIASTOLIC BLOOD PRESSURE: 84 MMHG | BODY MASS INDEX: 26.66 KG/M2 | WEIGHT: 160 LBS | OXYGEN SATURATION: 97 %

## 2022-04-21 DIAGNOSIS — K05.00 GINGIVITIS, ACUTE: ICD-10-CM

## 2022-04-21 DIAGNOSIS — K08.89 PAIN, DENTAL: Primary | ICD-10-CM

## 2022-04-21 DIAGNOSIS — H92.01 OTALGIA OF RIGHT EAR: ICD-10-CM

## 2022-04-21 DIAGNOSIS — K02.9 DENTAL CARIES: ICD-10-CM

## 2022-04-21 DIAGNOSIS — K08.89 OTHER SPECIFIED DISORDERS OF TEETH AND SUPPORTING STRUCTURES: ICD-10-CM

## 2022-04-21 PROCEDURE — 96372 THER/PROPH/DIAG INJ SC/IM: CPT | Performed by: NURSE PRACTITIONER

## 2022-04-21 PROCEDURE — 63600175 PHARM REV CODE 636 W HCPCS: Performed by: NURSE PRACTITIONER

## 2022-04-21 PROCEDURE — 99284 EMERGENCY DEPT VISIT MOD MDM: CPT | Mod: 25

## 2022-04-21 RX ORDER — CLINDAMYCIN HYDROCHLORIDE 300 MG/1
300 CAPSULE ORAL EVERY 8 HOURS
Qty: 30 CAPSULE | Refills: 0 | Status: SHIPPED | OUTPATIENT
Start: 2022-04-21 | End: 2022-05-01

## 2022-04-21 RX ORDER — KETOROLAC TROMETHAMINE 10 MG/1
10 TABLET, FILM COATED ORAL EVERY 6 HOURS
Qty: 20 TABLET | Refills: 0 | Status: SHIPPED | OUTPATIENT
Start: 2022-04-21 | End: 2022-04-26

## 2022-04-21 RX ORDER — KETOROLAC TROMETHAMINE 30 MG/ML
30 INJECTION, SOLUTION INTRAMUSCULAR; INTRAVENOUS
Status: COMPLETED | OUTPATIENT
Start: 2022-04-21 | End: 2022-04-21

## 2022-04-21 RX ADMIN — KETOROLAC TROMETHAMINE 30 MG: 30 INJECTION, SOLUTION INTRAMUSCULAR at 02:04

## 2022-04-21 NOTE — ED PROVIDER NOTES
"Encounter Date: 4/21/2022       History     Chief Complaint   Patient presents with    Otalgia     Patient c/o right sided ear pain also c/o headache and sore throat. Onset yesterday. Denies fevers. States "feels like something is stuck in my ear."     The history is provided by the patient.   Otalgia  This is a new problem. The current episode started yesterday. There is pain in the right ear. The problem occurs throughout the day. The problem has been unchanged. The pain is at a severity of 8/10. Associated symptoms include headaches, sore throat and neck pain. Pertinent negatives include no ear discharge, no hearing loss, no rhinorrhea, no abdominal pain, no diarrhea, no vomiting, no cough and no rash. Associated symptoms comments: Right side ear, head. Her past medical history does not include chronic ear infection, hearing loss or tympanostomy tube.   Dental Pain  The primary symptoms include headaches and sore throat. Primary symptoms do not include cough. The symptoms began two days ago. The symptoms are worsening. The symptoms are new. The symptoms occur constantly.   The headache began yesterday. Headache is a new problem. The pain from the headache is at a severity of 8/10. Location/region(s) of the headache: right unilateral. The headache is associated with activity. The headache is not associated with photophobia or visual change.   The sore throat began yesterday. The sore throat has been unchanged since its onset. The sore throat is not accompanied by trouble swallowing. The sore throat pain is at a severity of 6/10.   Additional symptoms include: gum swelling, gum tenderness, jaw pain and ear pain. Additional symptoms do not include: trouble swallowing and hearing loss. Medical issues include: periodontal disease.     Review of patient's allergies indicates:   Allergen Reactions    Pcn [penicillins] Anaphylaxis    Shellfish containing products Anaphylaxis    Iodine and iodide containing products " Itching and Rash     Past Medical History:   Diagnosis Date    Anxiety     Hyperlipidemia     Hypertension     Migraine headache      Past Surgical History:   Procedure Laterality Date    ANOSCOPY N/A 2/12/2020    Procedure: ANOSCOPY;  Surgeon: Musa Cannon MD;  Location: [facility] OR;  Service: General;  Laterality: N/A;    INCISION AND DRAINAGE OF BUTTOCK N/A 7/14/2021    Procedure: INCISION AND DRAINAGE, BUTTOCK;  Surgeon: Jayjay Swain MD;  Location: [facility] OR;  Service: General;  Laterality: N/A;  JESSEE-RECTAL    INCISION OF PERIRECTAL ABSCESS Left 2/12/2020    Procedure: INCISION, ABSCESS, PERIRECTAL;  Surgeon: Musa Cannon MD;  Location: [facility] OR;  Service: General;  Laterality: Left;     Family History   Problem Relation Age of Onset    Breast cancer Mother     Breast cancer Paternal Grandmother      Social History     Tobacco Use    Smoking status: Current Every Day Smoker     Packs/day: 1.00     Years: 20.00     Pack years: 20.00     Types: Cigarettes    Smokeless tobacco: Never Used   Substance Use Topics    Alcohol use: Yes     Alcohol/week: 7.0 standard drinks     Types: 7 Glasses of wine per week    Drug use: Yes     Frequency: 3.0 times per week     Types: Marijuana     Review of Systems   Constitutional: Positive for activity change.   HENT: Positive for dental problem, ear pain and sore throat. Negative for ear discharge, hearing loss, rhinorrhea and trouble swallowing.    Eyes: Negative for photophobia.   Respiratory: Negative for cough.    Gastrointestinal: Negative for abdominal pain, diarrhea and vomiting.   Musculoskeletal: Positive for neck pain.   Skin: Negative for rash.   Neurological: Positive for headaches.   All other systems reviewed and are negative.      Physical Exam     Initial Vitals [04/21/22 1444]   BP Pulse Resp Temp SpO2   116/84 68 15 98.4 °F (36.9 °C) 97 %      MAP       --         Physical Exam    Nursing note and vitals reviewed.  Constitutional: She  appears well-developed and well-nourished. No distress.   HENT:   Head: Normocephalic and atraumatic.   Right Ear: Tympanic membrane normal.   Left Ear: Tympanic membrane normal.   Mouth/Throat: Abnormal dentition. Dental caries present.       Eyes: EOM are normal. Pupils are equal, round, and reactive to light.   Neck:   Normal range of motion.  Cardiovascular: Normal rate and regular rhythm.   No murmur heard.  Pulmonary/Chest: Breath sounds normal. No respiratory distress. She has no wheezes. She has no rhonchi. She has no rales. She exhibits no tenderness.   Musculoskeletal:         General: Normal range of motion.      Cervical back: Normal range of motion.     Neurological: She is alert and oriented to person, place, and time. She has normal strength. GCS score is 15. GCS eye subscore is 4. GCS verbal subscore is 5. GCS motor subscore is 6.   Skin: Skin is warm and dry.   Psychiatric: She has a normal mood and affect.         ED Course   Procedures  Labs Reviewed - No data to display       Imaging Results    None          Medications   ketorolac injection 30 mg (30 mg Intramuscular Given 4/21/22 0561)     Medical Decision Making:   Differential Diagnosis:   Dental abscess, dental caries, acute gingivitis  ED Management:  Ear exam was normal  Otalgia, right unilateral headache seems from dental pain/infection.  Will treat dental infection with oral antibiotics  Patient voiced calling her dentist to follow up ASAP.  Please return for new, changing, or worsening pain. The patient was also instructed that follow up with a primary care physician is strongly recommended after any visit to the ED.  Patient expressed understanding and agreed with treatment plan and was discharged in stable condition.                         Clinical Impression:   Final diagnoses:  [K08.89] Pain, dental (Primary)  [K08.89] Other specified disorders of teeth and supporting structures  [K05.00] Gingivitis, acute  [K02.9] Dental  caries  [H92.01] Otalgia of right ear          ED Disposition Condition    Discharge Stable        ED Prescriptions     Medication Sig Dispense Start Date End Date Auth. Provider    clindamycin (CLEOCIN) 300 MG capsule Take 1 capsule (300 mg total) by mouth every 8 (eight) hours. for 10 days 30 capsule 4/21/2022 5/1/2022 Dewey Duggan NP    ketorolac (TORADOL) 10 mg tablet Take 1 tablet (10 mg total) by mouth every 6 (six) hours. for 5 days 20 tablet 4/21/2022 4/26/2022 Dewey Duggan NP        Follow-up Information     Follow up With Specialties Details Why Contact Info    Dentist  Schedule an appointment as soon as possible for a visit       [facility] Emergency Dept Emergency Medicine  If symptoms worsen [address] [facility] [address] [phone]           Dewey Duggan NP  04/21/22 3946

## 2022-09-19 ENCOUNTER — HOSPITAL ENCOUNTER (OUTPATIENT)
Dept: RADIOLOGY | Facility: HOSPITAL | Age: 42
Discharge: HOME | End: 2022-09-19
Attending: NURSE PRACTITIONER

## 2022-09-19 DIAGNOSIS — R92.8 OTHER ABNORMAL AND INCONCLUSIVE FINDINGS ON DIAGNOSTIC IMAGING OF BREAST: ICD-10-CM

## 2022-09-19 PROCEDURE — 77065 DX MAMMO INCL CAD UNI: CPT | Mod: 26,RT,, | Performed by: RADIOLOGY

## 2022-09-19 PROCEDURE — 77065 DX MAMMO INCL CAD UNI: CPT | Mod: TC,RT

## 2022-09-19 PROCEDURE — 77061 BREAST TOMOSYNTHESIS UNI: CPT | Mod: 26,RT,, | Performed by: RADIOLOGY

## 2022-09-19 PROCEDURE — 77065 MAMMO DIGITAL DIAGNOSTIC RIGHT WITH TOMO: ICD-10-PCS | Mod: 26,RT,, | Performed by: RADIOLOGY

## 2022-09-19 PROCEDURE — 77061 MAMMO DIGITAL DIAGNOSTIC RIGHT WITH TOMO: ICD-10-PCS | Mod: 26,RT,, | Performed by: RADIOLOGY

## 2022-12-04 ENCOUNTER — HOSPITAL ENCOUNTER (EMERGENCY)
Facility: HOSPITAL | Age: 42
Discharge: HOME | End: 2022-12-04
Attending: INTERNAL MEDICINE

## 2022-12-04 VITALS
RESPIRATION RATE: 18 BRPM | OXYGEN SATURATION: 100 % | BODY MASS INDEX: 26.66 KG/M2 | HEIGHT: 65 IN | WEIGHT: 160 LBS | HEART RATE: 80 BPM | DIASTOLIC BLOOD PRESSURE: 89 MMHG | TEMPERATURE: 99 F | SYSTOLIC BLOOD PRESSURE: 121 MMHG

## 2022-12-04 DIAGNOSIS — R07.9 CHEST PAIN, UNSPECIFIED TYPE: Primary | ICD-10-CM

## 2022-12-04 DIAGNOSIS — R07.9 CHEST PAIN: ICD-10-CM

## 2022-12-04 LAB
ALBUMIN SERPL BCP-MCNC: 4.4 G/DL (ref 3.5–5.2)
ALP SERPL-CCNC: 61 U/L (ref 55–135)
ALT SERPL W/O P-5'-P-CCNC: 11 U/L (ref 10–44)
ANION GAP SERPL CALC-SCNC: 14 MMOL/L (ref 8–16)
AST SERPL-CCNC: 17 U/L (ref 10–40)
BASOPHILS # BLD AUTO: 0.07 K/UL (ref 0–0.2)
BASOPHILS NFR BLD: 0.7 % (ref 0–1.9)
BILIRUB SERPL-MCNC: 0.3 MG/DL (ref 0.1–1)
BNP SERPL-MCNC: 27 PG/ML (ref 0–99)
BUN SERPL-MCNC: 14 MG/DL (ref 6–20)
CALCIUM SERPL-MCNC: 10 MG/DL (ref 8.7–10.5)
CHLORIDE SERPL-SCNC: 98 MMOL/L (ref 95–110)
CO2 SERPL-SCNC: 24 MMOL/L (ref 23–29)
CREAT SERPL-MCNC: 0.8 MG/DL (ref 0.5–1.4)
D DIMER PPP IA.FEU-MCNC: <0.19 MG/L FEU
DIFFERENTIAL METHOD: ABNORMAL
EOSINOPHIL # BLD AUTO: 0.3 K/UL (ref 0–0.5)
EOSINOPHIL NFR BLD: 2.6 % (ref 0–8)
ERYTHROCYTE [DISTWIDTH] IN BLOOD BY AUTOMATED COUNT: 12.2 % (ref 11.5–14.5)
EST. GFR  (NO RACE VARIABLE): >60 ML/MIN/1.73 M^2
GLUCOSE SERPL-MCNC: 78 MG/DL (ref 70–110)
HCT VFR BLD AUTO: 38.3 % (ref 37–48.5)
HGB BLD-MCNC: 13.3 G/DL (ref 12–16)
IMM GRANULOCYTES # BLD AUTO: 0.04 K/UL (ref 0–0.04)
IMM GRANULOCYTES NFR BLD AUTO: 0.4 % (ref 0–0.5)
INR PPP: 1 (ref 0.8–1.2)
LYMPHOCYTES # BLD AUTO: 2.8 K/UL (ref 1–4.8)
LYMPHOCYTES NFR BLD: 29 % (ref 18–48)
MCH RBC QN AUTO: 32.8 PG (ref 27–31)
MCHC RBC AUTO-ENTMCNC: 34.7 G/DL (ref 32–36)
MCV RBC AUTO: 95 FL (ref 82–98)
MONOCYTES # BLD AUTO: 0.5 K/UL (ref 0.3–1)
MONOCYTES NFR BLD: 5.5 % (ref 4–15)
NEUTROPHILS # BLD AUTO: 6 K/UL (ref 1.8–7.7)
NEUTROPHILS NFR BLD: 61.8 % (ref 38–73)
NRBC BLD-RTO: 0 /100 WBC
PLATELET # BLD AUTO: 356 K/UL (ref 150–450)
PMV BLD AUTO: 10 FL (ref 9.2–12.9)
POTASSIUM SERPL-SCNC: 3.9 MMOL/L (ref 3.5–5.1)
PROT SERPL-MCNC: 7.6 G/DL (ref 6–8.4)
PROTHROMBIN TIME: 10.5 SEC (ref 9–12.5)
RBC # BLD AUTO: 4.05 M/UL (ref 4–5.4)
SODIUM SERPL-SCNC: 136 MMOL/L (ref 136–145)
TROPONIN I SERPL DL<=0.01 NG/ML-MCNC: 0.02 NG/ML (ref 0–0.03)
WBC # BLD AUTO: 9.63 K/UL (ref 3.9–12.7)

## 2022-12-04 PROCEDURE — 71046 XR CHEST PA AND LATERAL: ICD-10-PCS | Mod: 26,,, | Performed by: RADIOLOGY

## 2022-12-04 PROCEDURE — 93010 ELECTROCARDIOGRAM REPORT: CPT | Mod: ,,, | Performed by: INTERNAL MEDICINE

## 2022-12-04 PROCEDURE — 99285 EMERGENCY DEPT VISIT HI MDM: CPT | Mod: 25

## 2022-12-04 PROCEDURE — 85379 FIBRIN DEGRADATION QUANT: CPT | Performed by: INTERNAL MEDICINE

## 2022-12-04 PROCEDURE — 96374 THER/PROPH/DIAG INJ IV PUSH: CPT

## 2022-12-04 PROCEDURE — 93010 EKG 12-LEAD: ICD-10-PCS | Mod: ,,, | Performed by: INTERNAL MEDICINE

## 2022-12-04 PROCEDURE — 36415 COLL VENOUS BLD VENIPUNCTURE: CPT | Performed by: INTERNAL MEDICINE

## 2022-12-04 PROCEDURE — 80053 COMPREHEN METABOLIC PANEL: CPT | Performed by: INTERNAL MEDICINE

## 2022-12-04 PROCEDURE — 84484 ASSAY OF TROPONIN QUANT: CPT | Performed by: INTERNAL MEDICINE

## 2022-12-04 PROCEDURE — 83880 ASSAY OF NATRIURETIC PEPTIDE: CPT | Performed by: INTERNAL MEDICINE

## 2022-12-04 PROCEDURE — 85025 COMPLETE CBC W/AUTO DIFF WBC: CPT | Performed by: INTERNAL MEDICINE

## 2022-12-04 PROCEDURE — 85610 PROTHROMBIN TIME: CPT | Performed by: INTERNAL MEDICINE

## 2022-12-04 PROCEDURE — 93005 ELECTROCARDIOGRAM TRACING: CPT

## 2022-12-04 PROCEDURE — 71046 X-RAY EXAM CHEST 2 VIEWS: CPT | Mod: 26,,, | Performed by: RADIOLOGY

## 2022-12-04 PROCEDURE — 63600175 PHARM REV CODE 636 W HCPCS: Performed by: INTERNAL MEDICINE

## 2022-12-04 PROCEDURE — 71046 X-RAY EXAM CHEST 2 VIEWS: CPT | Mod: TC

## 2022-12-04 RX ORDER — NAPROXEN SODIUM 220 MG/1
324 TABLET, FILM COATED ORAL
Status: DISCONTINUED | OUTPATIENT
Start: 2022-12-04 | End: 2022-12-04

## 2022-12-04 RX ORDER — KETOROLAC TROMETHAMINE 30 MG/ML
15 INJECTION, SOLUTION INTRAMUSCULAR; INTRAVENOUS
Status: COMPLETED | OUTPATIENT
Start: 2022-12-04 | End: 2022-12-04

## 2022-12-04 RX ADMIN — KETOROLAC TROMETHAMINE 15 MG: 30 INJECTION, SOLUTION INTRAMUSCULAR at 11:12

## 2022-12-04 NOTE — ED PROVIDER NOTES
Encounter Date: 12/4/2022       History     Chief Complaint   Patient presents with    Chest Pain     Patient reports substernal chest pain x 45 minutes. Pain radiates into her back.      Patient comes in complaining about chest pain generalized in the last 1 hour while she was at work.  Denies any shortness of breath, no nausea vomiting fever chills abdominal pain.  The patient was seen in January for similar chest pain workup but does not follow with cardiologist.  The patient denies any MI or coronary events.      Review of patient's allergies indicates:   Allergen Reactions    Pcn [penicillins] Anaphylaxis    Shellfish containing products Anaphylaxis    Iodine and iodide containing products Itching and Rash     Past Medical History:   Diagnosis Date    Anxiety     Hyperlipidemia     Hypertension     Migraine headache      Past Surgical History:   Procedure Laterality Date    ANOSCOPY N/A 2/12/2020    Procedure: ANOSCOPY;  Surgeon: Musa Cannon MD;  Location: [facility] OR;  Service: General;  Laterality: N/A;    INCISION AND DRAINAGE OF BUTTOCK N/A 7/14/2021    Procedure: INCISION AND DRAINAGE, BUTTOCK;  Surgeon: Jayjay Swain MD;  Location: [facility] OR;  Service: General;  Laterality: N/A;  JESSEE-RECTAL    INCISION OF PERIRECTAL ABSCESS Left 2/12/2020    Procedure: INCISION, ABSCESS, PERIRECTAL;  Surgeon: Musa Cannon MD;  Location: [facility] OR;  Service: General;  Laterality: Left;     Family History   Problem Relation Age of Onset    Breast cancer Mother     Breast cancer Paternal Grandmother      Social History     Tobacco Use    Smoking status: Every Day     Packs/day: 1.00     Years: 20.00     Pack years: 20.00     Types: Cigarettes    Smokeless tobacco: Never   Substance Use Topics    Alcohol use: Yes     Alcohol/week: 7.0 standard drinks     Types: 7 Glasses of wine per week    Drug use: Yes     Frequency: 3.0 times per week     Types: Marijuana     Review of Systems   Constitutional:  Negative for  fever.   HENT:  Negative for sore throat.    Respiratory:  Negative for shortness of breath.    Cardiovascular:  Negative for chest pain.   Gastrointestinal:  Negative for nausea.   Genitourinary:  Negative for dysuria.   Musculoskeletal:  Negative for back pain.   Skin:  Negative for rash.   Neurological:  Negative for weakness.   Hematological:  Does not bruise/bleed easily.     Physical Exam     Initial Vitals   BP Pulse Resp Temp SpO2   12/04/22 1006 12/04/22 1006 12/04/22 1006 12/04/22 1018 12/04/22 1006   121/89 80 18 98.7 °F (37.1 °C) 100 %      MAP       --                Physical Exam    Vitals reviewed.  Constitutional: She appears well-developed.   Eyes: Pupils are equal, round, and reactive to light.   Neck:   Normal range of motion.  Cardiovascular:  Normal rate, regular rhythm, normal heart sounds and normal pulses.           Pulmonary/Chest: Effort normal and breath sounds normal. No accessory muscle usage. No respiratory distress.   Abdominal: Abdomen is soft.   Musculoskeletal:         General: Normal range of motion.      Cervical back: Normal range of motion.     Neurological: She is alert. She has normal strength. No cranial nerve deficit or sensory deficit. GCS eye subscore is 4. GCS verbal subscore is 5. GCS motor subscore is 6.   Skin: Skin is warm.   Psychiatric: She has a normal mood and affect.       ED Course   Procedures  Labs Reviewed   CBC W/ AUTO DIFFERENTIAL - Abnormal; Notable for the following components:       Result Value    MCH 32.8 (*)     All other components within normal limits   COMPREHENSIVE METABOLIC PANEL   B-TYPE NATRIURETIC PEPTIDE   TROPONIN I   PROTIME-INR   D DIMER, QUANTITATIVE        ECG Results              EKG 12-lead (Preliminary result)  Result time 12/04/22 10:27:06      ED Interpretation by Jonh Cr MD (12/04/22 10:27:06, [facility], Emergency Medicine)    Normal sinus rhythm rate of 68 and no acute ischemic changes                                   Imaging Results              X-Ray Chest PA And Lateral (Final result)  Result time 12/04/22 10:27:26      Final result by Toney Potts MD (12/04/22 10:27:26)                   Impression:      No evidence of an acute radiographic abnormality.      Electronically signed by: Toney Potts  Date:    12/04/2022  Time:    10:27               Narrative:    EXAMINATION:  XR CHEST PA AND LATERAL    CLINICAL HISTORY:  Chest Pain;    TECHNIQUE:  PA and lateral views of the chest were performed.    COMPARISON:  01/08/2022    FINDINGS:  Cardiac monitor wires overlie the chest.  Cardiomediastinal silhouette is within normal limits.   Lungs are well expanded without evidence of focal consolidation, pleural effusion, or pneumothorax.  Pulmonary vasculature and hilar regions are within normal limits.  No acute osseous abnormality.                                       Medications   ketorolac injection 15 mg (has no administration in time range)     Medical Decision Making:   ED Management:  Patient presents with non angina type chest pain.  Has been occurred in the past has not had follow-up with Cardiology.  Labs including D-dimer all negative.  Advised will refer her to a cardiologist for further workup evaluation including stress test, echocardiogram, and or heart catheterization.           ED Course as of 12/04/22 1131   Sun Dec 04, 2022   1026 CBC auto differential(!) [PW]   1027 EKG 12-lead [PW]   1034 X-Ray Chest PA And Lateral [PW]   1102 Troponin I: 0.020 [PW]   1102 BNP: 27 [PW]   1102 Comprehensive metabolic panel [PW]   1129 D-Dimer: <0.19 [PW]   1129 Troponin I: 0.020 [PW]   1129 BNP: 27 [PW]      ED Course User Index  [PW] Jonh Cr MD                 Clinical Impression:   Final diagnoses:  [R07.9] Chest pain  [R07.9] Chest pain, unspecified type (Primary)        ED Disposition Condition    Discharge Stable          ED Prescriptions    None       Follow-up Information    None          Jonh Cr  MD  12/04/22 1139

## 2022-12-04 NOTE — ED TRIAGE NOTES
Patient reports that she was at work this am when she began experiencing substernal chest pain that radiates into her back causeing dyspnea.

## 2022-12-05 ENCOUNTER — TELEPHONE (OUTPATIENT)
Dept: CARDIOLOGY | Facility: CLINIC | Age: 42
End: 2022-12-05

## 2022-12-07 ENCOUNTER — TELEPHONE (OUTPATIENT)
Dept: CARDIOLOGY | Facility: CLINIC | Age: 42
End: 2022-12-07

## 2022-12-07 NOTE — TELEPHONE ENCOUNTER
Spoke wit pt about referral. She has no insurance right now. I gave her the assistance program number. She is going to call them first before she decides she needs an appointment.

## 2022-12-16 ENCOUNTER — TELEPHONE (OUTPATIENT)
Dept: CARDIOLOGY | Facility: CLINIC | Age: 42
End: 2022-12-16

## 2023-06-08 ENCOUNTER — HOSPITAL ENCOUNTER (EMERGENCY)
Facility: HOSPITAL | Age: 43
Discharge: HOME | End: 2023-06-08
Attending: EMERGENCY MEDICINE

## 2023-06-08 VITALS
HEART RATE: 105 BPM | WEIGHT: 170 LBS | SYSTOLIC BLOOD PRESSURE: 138 MMHG | OXYGEN SATURATION: 99 % | RESPIRATION RATE: 20 BRPM | TEMPERATURE: 98 F | DIASTOLIC BLOOD PRESSURE: 99 MMHG | BODY MASS INDEX: 28.32 KG/M2 | HEIGHT: 65 IN

## 2023-06-08 DIAGNOSIS — L23.9 ALLERGIC CONTACT DERMATITIS, UNSPECIFIED TRIGGER: Primary | ICD-10-CM

## 2023-06-08 DIAGNOSIS — L50.9 URTICARIA: ICD-10-CM

## 2023-06-08 DIAGNOSIS — F41.9 ANXIETY: ICD-10-CM

## 2023-06-08 LAB — B-HCG UR QL: NEGATIVE

## 2023-06-08 PROCEDURE — 63600175 PHARM REV CODE 636 W HCPCS: Performed by: NURSE PRACTITIONER

## 2023-06-08 PROCEDURE — 81025 URINE PREGNANCY TEST: CPT | Performed by: NURSE PRACTITIONER

## 2023-06-08 PROCEDURE — 25000003 PHARM REV CODE 250: Performed by: NURSE PRACTITIONER

## 2023-06-08 PROCEDURE — 99284 EMERGENCY DEPT VISIT MOD MDM: CPT | Mod: 25

## 2023-06-08 PROCEDURE — 96372 THER/PROPH/DIAG INJ SC/IM: CPT | Performed by: NURSE PRACTITIONER

## 2023-06-08 RX ORDER — METHYLPREDNISOLONE 4 MG/1
TABLET ORAL
Qty: 1 EACH | Refills: 0 | Status: SHIPPED | OUTPATIENT
Start: 2023-06-08 | End: 2023-06-29

## 2023-06-08 RX ORDER — LORAZEPAM 0.5 MG/1
1 TABLET ORAL
Status: COMPLETED | OUTPATIENT
Start: 2023-06-08 | End: 2023-06-08

## 2023-06-08 RX ORDER — DIPHENHYDRAMINE HYDROCHLORIDE 50 MG/ML
25 INJECTION INTRAMUSCULAR; INTRAVENOUS
Status: COMPLETED | OUTPATIENT
Start: 2023-06-08 | End: 2023-06-08

## 2023-06-08 RX ORDER — HYDROXYZINE HYDROCHLORIDE 25 MG/1
25 TABLET, FILM COATED ORAL 3 TIMES DAILY PRN
Qty: 12 TABLET | Refills: 0 | Status: SHIPPED | OUTPATIENT
Start: 2023-06-08

## 2023-06-08 RX ORDER — FAMOTIDINE 20 MG/1
40 TABLET, FILM COATED ORAL
Status: COMPLETED | OUTPATIENT
Start: 2023-06-08 | End: 2023-06-08

## 2023-06-08 RX ORDER — DEXAMETHASONE SODIUM PHOSPHATE 10 MG/ML
10 INJECTION INTRAMUSCULAR; INTRAVENOUS
Status: COMPLETED | OUTPATIENT
Start: 2023-06-08 | End: 2023-06-08

## 2023-06-08 RX ORDER — ATORVASTATIN CALCIUM 10 MG/1
1 TABLET, FILM COATED ORAL NIGHTLY
COMMUNITY
Start: 2023-01-20

## 2023-06-08 RX ORDER — CITALOPRAM 20 MG/1
20 TABLET, FILM COATED ORAL
COMMUNITY
Start: 2023-04-25

## 2023-06-08 RX ORDER — HYDROXYZINE PAMOATE 25 MG/1
50 CAPSULE ORAL
Status: DISCONTINUED | OUTPATIENT
Start: 2023-06-08 | End: 2023-06-08

## 2023-06-08 RX ADMIN — DEXAMETHASONE SODIUM PHOSPHATE 10 MG: 10 INJECTION INTRAMUSCULAR; INTRAVENOUS at 11:06

## 2023-06-08 RX ADMIN — DIPHENHYDRAMINE HYDROCHLORIDE 25 MG: 50 INJECTION INTRAMUSCULAR; INTRAVENOUS at 11:06

## 2023-06-08 RX ADMIN — FAMOTIDINE 40 MG: 20 TABLET ORAL at 11:06

## 2023-06-08 RX ADMIN — LORAZEPAM 1 MG: 0.5 TABLET ORAL at 11:06

## 2023-06-08 NOTE — ED PROVIDER NOTES
Encounter Date: 6/8/2023       History     Chief Complaint   Patient presents with    Rash     Pt reports itchy rash to chest,back, bilateral arms and legs x 1 week. Pt reports recently starting to make tumbler cups and believes she may be allergic to the epoxy.      The history is provided by the patient.   Rash   This is a new problem. The current episode started several days ago. The problem has been gradually worsening. The rash is present on the face, left arm and right arm. The pain is at a severity of 0/10. The pain has been Constant since onset. Associated symptoms include itching. Pertinent negatives include no blisters, no pain and no weeping. She has tried antihistamines for the symptoms. The treatment provided no relief.   Review of patient's allergies indicates:   Allergen Reactions    Pcn [penicillins] Anaphylaxis    Shellfish containing products Anaphylaxis    Iodine and iodide containing products Itching and Rash     Past Medical History:   Diagnosis Date    Anxiety     Hyperlipidemia     Hypertension     Migraine headache      Past Surgical History:   Procedure Laterality Date    ANOSCOPY N/A 2/12/2020    Procedure: ANOSCOPY;  Surgeon: Musa Cannon MD;  Location: [facility] OR;  Service: General;  Laterality: N/A;    INCISION AND DRAINAGE OF BUTTOCK N/A 7/14/2021    Procedure: INCISION AND DRAINAGE, BUTTOCK;  Surgeon: Jayjay Swain MD;  Location: [facility] OR;  Service: General;  Laterality: N/A;  JESSEE-RECTAL    INCISION OF PERIRECTAL ABSCESS Left 2/12/2020    Procedure: INCISION, ABSCESS, PERIRECTAL;  Surgeon: Musa Cannon MD;  Location: [facility] OR;  Service: General;  Laterality: Left;     Family History   Problem Relation Age of Onset    Breast cancer Mother     Breast cancer Paternal Grandmother      Social History     Tobacco Use    Smoking status: Every Day     Packs/day: 1.00     Years: 20.00     Pack years: 20.00     Types: Cigarettes    Smokeless tobacco: Never   Substance Use Topics     Alcohol use: Yes     Alcohol/week: 7.0 standard drinks     Types: 7 Glasses of wine per week    Drug use: Yes     Frequency: 3.0 times per week     Types: Marijuana     Review of Systems   Skin:  Positive for itching and rash.   Psychiatric/Behavioral:  The patient is nervous/anxious.    All other systems reviewed and are negative.    Physical Exam     Initial Vitals [06/08/23 1026]   BP Pulse Resp Temp SpO2   (!) 138/99 105 20 97.9 °F (36.6 °C) 99 %      MAP       --         Physical Exam    Nursing note and vitals reviewed.  Constitutional: She appears well-developed and well-nourished. No distress.   HENT:   Head: Normocephalic and atraumatic.   Eyes: EOM are normal. Pupils are equal, round, and reactive to light.   Neck:   Normal range of motion.  Cardiovascular:  Normal rate and regular rhythm.           No murmur heard.  Pulmonary/Chest: Breath sounds normal. No respiratory distress. She has no wheezes. She has no rhonchi. She has no rales. She exhibits no tenderness.   Abdominal: Abdomen is soft.   Musculoskeletal:         General: Normal range of motion.      Cervical back: Normal range of motion.     Neurological: She is alert and oriented to person, place, and time. She has normal strength. GCS score is 15. GCS eye subscore is 4. GCS verbal subscore is 5. GCS motor subscore is 6.   Skin: Skin is warm and dry.   Psychiatric: Her mood appears anxious (nervous).   Shaking, nervous during interview and physical,  Patient has history of anxiety, does not take any medication for chronic anxiety       ED Course   Procedures  Labs Reviewed   PREGNANCY TEST, URINE RAPID    Narrative:     Specimen Source->Urine          Imaging Results    None          Medications   dexAMETHasone sodium phos (PF) injection 10 mg (10 mg Intramuscular Given 6/8/23 1113)   diphenhydrAMINE injection 25 mg (25 mg Intramuscular Given 6/8/23 1113)   famotidine tablet 40 mg (40 mg Oral Given 6/8/23 1112)   LORazepam tablet 1 mg (1 mg Oral  Given 6/8/23 1112)                     Medical Decision Making  DDX:  Cellulitis, erysipelas, skin abscess, insect bite, necrotizing fasciitis, contact dermatitis, allergic dermatitis       Problems Addressed:  Allergic contact dermatitis, unspecified trigger: acute illness or injury  Anxiety: chronic illness or injury with exacerbation, progression, or side effects of treatment     Details: One time dose lorazepam given, patient's symptoms improved  Urticaria: acute illness or injury     Details: Symptoms of rashes, swelling of face improved after medications given    Risk  Prescription drug management.  Risk Details: Patient discharged to home in stable condition.  She was advised to follow-up with her provider primary care provider.  She agrees to the current treatment plan.               Clinical Impression:   Final diagnoses:  [L23.9] Allergic contact dermatitis, unspecified trigger (Primary)  [F41.9] Anxiety  [L50.9] Urticaria        ED Disposition Condition    Discharge Stable          ED Prescriptions       Medication Sig Dispense Start Date End Date Auth. Provider    methylPREDNISolone (MEDROL DOSEPACK) 4 mg tablet Take it as directed with food 1 each 6/8/2023 6/29/2023 Dewey Duggan NP    hydrOXYzine HCL (ATARAX) 25 MG tablet Take 1 tablet (25 mg total) by mouth 3 (three) times daily as needed for Itching or Anxiety. 12 tablet 6/8/2023 -- Dewey Duggan NP          Follow-up Information       Follow up With Specialties Details Why Contact Info    PCP        [facility] - Emergency Dept Emergency Medicine Schedule an appointment as soon as possible for a visit   [address] [facility] [id number] [phone]             Dewey Duggan NP  06/08/23 8877

## 2023-08-09 ENCOUNTER — HOSPITAL ENCOUNTER (EMERGENCY)
Facility: HOSPITAL | Age: 43
Discharge: HOME | End: 2023-08-09
Attending: EMERGENCY MEDICINE

## 2023-08-09 VITALS
OXYGEN SATURATION: 98 % | DIASTOLIC BLOOD PRESSURE: 70 MMHG | SYSTOLIC BLOOD PRESSURE: 120 MMHG | RESPIRATION RATE: 20 BRPM | WEIGHT: 164 LBS | HEART RATE: 101 BPM | HEIGHT: 65 IN | BODY MASS INDEX: 27.32 KG/M2 | TEMPERATURE: 98 F

## 2023-08-09 DIAGNOSIS — L02.31 CELLULITIS AND ABSCESS OF BUTTOCK: Primary | ICD-10-CM

## 2023-08-09 DIAGNOSIS — L03.317 CELLULITIS AND ABSCESS OF BUTTOCK: Primary | ICD-10-CM

## 2023-08-09 PROCEDURE — 99284 EMERGENCY DEPT VISIT MOD MDM: CPT

## 2023-08-09 PROCEDURE — 25000003 PHARM REV CODE 250: Performed by: EMERGENCY MEDICINE

## 2023-08-09 RX ORDER — IBUPROFEN 600 MG/1
600 TABLET ORAL EVERY 6 HOURS PRN
Qty: 30 TABLET | Refills: 0 | Status: SHIPPED | OUTPATIENT
Start: 2023-08-09

## 2023-08-09 RX ORDER — HYDROCODONE BITARTRATE AND ACETAMINOPHEN 10; 325 MG/1; MG/1
1 TABLET ORAL
Status: COMPLETED | OUTPATIENT
Start: 2023-08-09 | End: 2023-08-09

## 2023-08-09 RX ORDER — CLINDAMYCIN HYDROCHLORIDE 150 MG/1
300 CAPSULE ORAL
Status: COMPLETED | OUTPATIENT
Start: 2023-08-09 | End: 2023-08-09

## 2023-08-09 RX ORDER — KETOROLAC TROMETHAMINE 10 MG/1
10 TABLET, FILM COATED ORAL
Status: COMPLETED | OUTPATIENT
Start: 2023-08-09 | End: 2023-08-09

## 2023-08-09 RX ORDER — HYDROCODONE BITARTRATE AND ACETAMINOPHEN 7.5; 325 MG/1; MG/1
1 TABLET ORAL EVERY 6 HOURS PRN
Qty: 12 TABLET | Refills: 0 | OUTPATIENT
Start: 2023-08-09 | End: 2024-01-02

## 2023-08-09 RX ORDER — CLINDAMYCIN HYDROCHLORIDE 150 MG/1
300 CAPSULE ORAL 4 TIMES DAILY
Qty: 56 CAPSULE | Refills: 0 | Status: SHIPPED | OUTPATIENT
Start: 2023-08-09 | End: 2023-08-16

## 2023-08-09 RX ADMIN — KETOROLAC TROMETHAMINE 10 MG: 10 TABLET, FILM COATED ORAL at 12:08

## 2023-08-09 RX ADMIN — CLINDAMYCIN HYDROCHLORIDE 300 MG: 150 CAPSULE ORAL at 12:08

## 2023-08-09 RX ADMIN — HYDROCODONE BITARTRATE AND ACETAMINOPHEN 1 TABLET: 10; 325 TABLET ORAL at 12:08

## 2023-08-09 NOTE — DISCHARGE INSTRUCTIONS
Take all the antibiotics as necessary.  Return emergency with any worsening of the abscess, drainage, pain, fever, chills or any other concerning symptoms.

## 2023-08-09 NOTE — Clinical Note
"Kaylene Ivy"Jose was seen and treated in our emergency department on 8/9/2023.  She may return to work on 08/12/2023.       If you have any questions or concerns, please don't hesitate to call.      Lawrence Wyatt MD"

## 2023-08-09 NOTE — ED PROVIDER NOTES
Encounter Date: 8/9/2023       History     Chief Complaint   Patient presents with    rectal abscess     Re occurring perirectal abscess x 3 days . Previous surgery,( x 2 ) in same area.      42-year-old female comes in for evaluation of an abscess on her buttocks.  The patient states that she is had surgery on this twice.  Once by Dr. Swain.  Once by Dr. Cannon.  She done well with both of those.  This started to hurt again.  She came back to the emergency room for further evaluation.  Denies any fevers or chills.  The patient states that it actually got a little bit better since yesterday but is still very painful.  Difficult to sit on.  Difficult to walk.  This consistent with previous.  No further symptomatology.  Past medical history of anxiety hyperlipidemia hypertension migraine headaches and abscesses.      Review of patient's allergies indicates:   Allergen Reactions    Pcn [penicillins] Anaphylaxis    Shellfish containing products Anaphylaxis    Iodine and iodide containing products Itching and Rash     Past Medical History:   Diagnosis Date    Anxiety     Hyperlipidemia     Hypertension     Migraine headache      Past Surgical History:   Procedure Laterality Date    ANOSCOPY N/A 2/12/2020    Procedure: ANOSCOPY;  Surgeon: Musa Cannon MD;  Location: [facility] OR;  Service: General;  Laterality: N/A;    INCISION AND DRAINAGE OF BUTTOCK N/A 7/14/2021    Procedure: INCISION AND DRAINAGE, BUTTOCK;  Surgeon: Jayjay Swain MD;  Location: [facility] OR;  Service: General;  Laterality: N/A;  JESSEE-RECTAL    INCISION OF PERIRECTAL ABSCESS Left 2/12/2020    Procedure: INCISION, ABSCESS, PERIRECTAL;  Surgeon: Musa Cannon MD;  Location: [facility] OR;  Service: General;  Laterality: Left;     Family History   Problem Relation Age of Onset    Breast cancer Mother     Breast cancer Paternal Grandmother      Social History     Tobacco Use    Smoking status: Every Day     Current packs/day: 1.00     Average  packs/day: 1 pack/day for 20.0 years (20.0 ttl pk-yrs)     Types: Cigarettes    Smokeless tobacco: Never   Substance Use Topics    Alcohol use: Yes     Alcohol/week: 7.0 standard drinks of alcohol     Types: 7 Glasses of wine per week    Drug use: Yes     Frequency: 3.0 times per week     Types: Marijuana     Review of Systems   Constitutional:  Negative for fever.   HENT:  Negative for sore throat.    Respiratory:  Negative for shortness of breath.    Cardiovascular:  Negative for chest pain.   Gastrointestinal:  Negative for nausea.   Genitourinary:  Negative for dysuria.   Musculoskeletal:  Negative for back pain.   Skin:  Negative for rash and wound.        Abscess on the left buttocks has been had previously.   Neurological:  Negative for weakness.   Hematological:  Does not bruise/bleed easily.   All other systems reviewed and are negative.      Physical Exam     Initial Vitals [08/09/23 1113]   BP Pulse Resp Temp SpO2   (!) 144/109 (!) 123 20 98.9 °F (37.2 °C) 99 %      MAP       --         Physical Exam    Nursing note and vitals reviewed.  Constitutional: She appears well-developed and well-nourished.   HENT:   Head: Normocephalic and atraumatic.   Eyes: Pupils are equal, round, and reactive to light.   Neck:   Normal range of motion.  Cardiovascular:  Normal rate, regular rhythm and normal heart sounds.           Pulmonary/Chest: Breath sounds normal.   Abdominal: Abdomen is soft. Bowel sounds are normal.   Musculoskeletal:         General: Normal range of motion.      Cervical back: Normal range of motion.     Neurological: She is alert and oriented to person, place, and time. She has normal strength. GCS score is 15. GCS eye subscore is 4. GCS verbal subscore is 5. GCS motor subscore is 6.   Skin: Skin is warm and dry.   Abscess left buttocks.  Very soft.  Minimal redness.  Mildly tender to palpation.  No mountain noted.  There are multiple scars in the region of the same abscess.   Psychiatric: She has  a normal mood and affect. Her behavior is normal. Judgment and thought content normal.         ED Course   Procedures  Labs Reviewed - No data to display       Imaging Results    None          Medications   clindamycin capsule 300 mg (has no administration in time range)   HYDROcodone-acetaminophen  mg per tablet 1 tablet (has no administration in time range)   ketorolac tablet 10 mg (has no administration in time range)     Medical Decision Making:   Differential Diagnosis:   Abscess, deep space abscess, perirectal abscess, fistula.  Crohn's.  ED Management:  Patient is stable this time.  The patient does have an abscess but it seems to be very mild.  I suspect he can be actually treated with antibiotics at this time and no incision and drainage.  I explained this to the patient however this could go the opposite direction and she may necessitate an incision and drainage.  I explained this is well to the patient.  I will give the patient clindamycin.  Also pain medication anti-inflammatory medication.  Have the patient follow up with the primary care as necessary.  Return emergency with any worsening symptomatology.                          Clinical Impression:   Final diagnoses:  [L02.31, L03.317] Cellulitis and abscess of buttock (Primary)        ED Disposition Condition    Discharge Stable          ED Prescriptions       Medication Sig Dispense Start Date End Date Auth. Provider    HYDROcodone-acetaminophen (NORCO) 7.5-325 mg per tablet Take 1 tablet by mouth every 6 (six) hours as needed. 12 tablet 8/9/2023 -- Lawrence Wyatt MD    ibuprofen (ADVIL,MOTRIN) 600 MG tablet Take 1 tablet (600 mg total) by mouth every 6 (six) hours as needed for Pain. 30 tablet 8/9/2023 -- Lawrence Wyatt MD    clindamycin (CLEOCIN) 150 MG capsule Take 2 capsules (300 mg total) by mouth 4 (four) times daily. for 7 days 56 capsule 8/9/2023 8/16/2023 Lawrence Wyatt MD          Follow-up Information    None           Lawrence Wyatt MD  08/09/23 8141

## 2024-01-02 ENCOUNTER — HOSPITAL ENCOUNTER (EMERGENCY)
Facility: HOSPITAL | Age: 44
Discharge: HOME | End: 2024-01-02
Attending: EMERGENCY MEDICINE

## 2024-01-02 VITALS
TEMPERATURE: 98 F | BODY MASS INDEX: 26.66 KG/M2 | WEIGHT: 160 LBS | HEIGHT: 65 IN | OXYGEN SATURATION: 94 % | SYSTOLIC BLOOD PRESSURE: 116 MMHG | HEART RATE: 94 BPM | DIASTOLIC BLOOD PRESSURE: 74 MMHG | RESPIRATION RATE: 20 BRPM

## 2024-01-02 DIAGNOSIS — S00.03XA CONTUSION OF SCALP, INITIAL ENCOUNTER: Primary | ICD-10-CM

## 2024-01-02 PROCEDURE — 25000003 PHARM REV CODE 250: Performed by: NURSE PRACTITIONER

## 2024-01-02 PROCEDURE — 70450 CT HEAD/BRAIN W/O DYE: CPT | Mod: 26,,, | Performed by: RADIOLOGY

## 2024-01-02 PROCEDURE — 99284 EMERGENCY DEPT VISIT MOD MDM: CPT | Mod: 25

## 2024-01-02 PROCEDURE — 70450 CT HEAD/BRAIN W/O DYE: CPT | Mod: TC

## 2024-01-02 RX ORDER — HYDROCODONE BITARTRATE AND ACETAMINOPHEN 5; 325 MG/1; MG/1
1 TABLET ORAL EVERY 8 HOURS PRN
Qty: 6 TABLET | Refills: 0 | Status: SHIPPED | OUTPATIENT
Start: 2024-01-02

## 2024-01-02 RX ORDER — ACETAMINOPHEN 325 MG/1
650 TABLET ORAL
Status: COMPLETED | OUTPATIENT
Start: 2024-01-02 | End: 2024-01-02

## 2024-01-02 RX ADMIN — ACETAMINOPHEN 650 MG: 325 TABLET ORAL at 10:01

## 2024-01-03 NOTE — ED PROVIDER NOTES
Encounter Date: 1/2/2024       History     Chief Complaint   Patient presents with    Fall     Pt stepped out of car tonight and fell backwards.  Hit head on asphalt. Bleeding controlled.  ETOH +     Pt presents with head injury from a fall. Pt was drinking and fell to ground hitting head on asphalt.  No loss of consciousness. Pt relates ome etoh use prior to fall      Review of patient's allergies indicates:   Allergen Reactions    Pcn [penicillins] Anaphylaxis    Shellfish containing products Anaphylaxis    Iodine and iodide containing products Itching and Rash     Past Medical History:   Diagnosis Date    Anxiety     Hyperlipidemia     Hypertension     Migraine headache      Past Surgical History:   Procedure Laterality Date    ANOSCOPY N/A 2/12/2020    Procedure: ANOSCOPY;  Surgeon: Musa Cannon MD;  Location: [facility] OR;  Service: General;  Laterality: N/A;    INCISION AND DRAINAGE OF BUTTOCK N/A 7/14/2021    Procedure: INCISION AND DRAINAGE, BUTTOCK;  Surgeon: Jayjay Swain MD;  Location: [facility] OR;  Service: General;  Laterality: N/A;  JESSEE-RECTAL    INCISION OF PERIRECTAL ABSCESS Left 2/12/2020    Procedure: INCISION, ABSCESS, PERIRECTAL;  Surgeon: Musa Cannon MD;  Location: [facility] OR;  Service: General;  Laterality: Left;     Family History   Problem Relation Age of Onset    Breast cancer Mother     Breast cancer Paternal Grandmother      Social History     Tobacco Use    Smoking status: Every Day     Current packs/day: 1.00     Average packs/day: 1 pack/day for 20.0 years (20.0 ttl pk-yrs)     Types: Cigarettes    Smokeless tobacco: Never   Substance Use Topics    Alcohol use: Yes     Alcohol/week: 7.0 standard drinks of alcohol     Types: 7 Glasses of wine per week    Drug use: Yes     Frequency: 3.0 times per week     Types: Marijuana     Review of Systems   Constitutional: Negative.    HENT: Negative.     Respiratory: Negative.     Cardiovascular: Negative.    Gastrointestinal: Negative.     Endocrine: Negative.    Genitourinary: Negative.    Skin: Negative.    Neurological:  Positive for headaches.   Hematological: Negative.    Psychiatric/Behavioral: Negative.     All other systems reviewed and are negative.      Physical Exam     Initial Vitals [01/02/24 2112]   BP Pulse Resp Temp SpO2   116/74 94 20 98.2 °F (36.8 °C) (!) 94 %      MAP       --         Physical Exam    Nursing note and vitals reviewed.  Constitutional: She appears well-developed.   HENT:   Head:       Hematoma and laceration noted to right parietal region of head. No saturable laceration noted   Eyes: Pupils are equal, round, and reactive to light.   Neck:   Normal range of motion.  Pulmonary/Chest: Breath sounds normal.   Abdominal: Abdomen is soft. There is abdominal tenderness.   Musculoskeletal:         General: Normal range of motion.      Cervical back: Normal range of motion.     Neurological: She is alert and oriented to person, place, and time.   Skin: Skin is warm and dry. Capillary refill takes less than 2 seconds.         ED Course   Procedures  Labs Reviewed - No data to display       Imaging Results              CT Head Without Contrast (Final result)  Result time 01/02/24 21:54:41      Final result by Peter Fernandez MD (01/02/24 21:54:41)                   Impression:      Small right parietal scalp contusion with laceration.  No acute intracranial hemorrhage.      Electronically signed by: Peter Fernandez  Date:    01/02/2024  Time:    21:54               Narrative:    EXAMINATION:  CT HEAD WITHOUT CONTRAST    CLINICAL HISTORY:  Head trauma, minor (Age >= 65y);    TECHNIQUE:  Low dose axial CT images obtained throughout the head without intravenous contrast. Sagittal and coronal reconstructions were performed.    COMPARISON:  01/08/2022    FINDINGS:  Intracranial compartment:    Ventricles and sulci are normal in size for age without evidence of hydrocephalus. No extra-axial blood or fluid  collections.    The brain parenchyma appears normal. No parenchymal mass, hemorrhage, edema or major vascular distribution infarct.    Skull/extracranial contents (limited evaluation): No fracture. Mastoid air cells and paranasal sinuses are essentially clear.  Small right parietal scalp contusion with laceration.                                       Medications - No data to display  Medical Decision Making  Amount and/or Complexity of Data Reviewed  Radiology: ordered.               ED Course as of 01/02/24 2209   Tue Jan 02, 2024 2202 Bleeding controlled with direct pressure no suturable laceration noted large hematoma noted with tenderness, patient advised to return if any change or concern.   states he will watch patient closely for the next 24 hours return if any change in mental status vomiting or concerns whatsoever [MR]   2206  and patient verbalized understanding to return if any return of bleeding, mental status change or concerns whatsoever [MR]      ED Course User Index  [MR] Michel Barriga NP                             Clinical Impression:  Final diagnoses:  [S00.03XA] Contusion of scalp, initial encounter (Primary)          ED Disposition Condition    Discharge Stable          ED Prescriptions       Medication Sig Dispense Start Date End Date Auth. Provider    HYDROcodone-acetaminophen (NORCO) 5-325 mg per tablet Take 1 tablet by mouth every 8 (eight) hours as needed for Pain. 6 tablet 1/2/2024 -- Michel Barriga NP          Follow-up Information    None          Michel Barriga NP  01/02/24 2210

## 2024-02-10 ENCOUNTER — NURSE TRIAGE (OUTPATIENT)
Dept: ADMINISTRATIVE | Facility: CLINIC | Age: 44
End: 2024-02-10

## 2024-02-10 ENCOUNTER — HOSPITAL ENCOUNTER (EMERGENCY)
Facility: HOSPITAL | Age: 44
Discharge: HOME | End: 2024-02-11
Attending: EMERGENCY MEDICINE

## 2024-02-10 DIAGNOSIS — R07.9 CHEST PAIN: ICD-10-CM

## 2024-02-10 DIAGNOSIS — F10.10 ALCOHOL ABUSE: ICD-10-CM

## 2024-02-10 DIAGNOSIS — F10.930 ALCOHOL WITHDRAWAL SYNDROME WITHOUT COMPLICATION: Primary | ICD-10-CM

## 2024-02-10 LAB
ALBUMIN SERPL BCP-MCNC: 4.1 G/DL (ref 3.5–5.2)
ALP SERPL-CCNC: 67 U/L (ref 55–135)
ALT SERPL W/O P-5'-P-CCNC: 18 U/L (ref 10–44)
ANION GAP SERPL CALC-SCNC: 18 MMOL/L (ref 8–16)
AST SERPL-CCNC: 27 U/L (ref 10–40)
B-HCG UR QL: NEGATIVE
BASOPHILS # BLD AUTO: 0.07 K/UL (ref 0–0.2)
BASOPHILS NFR BLD: 0.8 % (ref 0–1.9)
BILIRUB SERPL-MCNC: 0.3 MG/DL (ref 0.1–1)
BILIRUB UR QL STRIP: NEGATIVE
BUN SERPL-MCNC: 17 MG/DL (ref 6–20)
CALCIUM SERPL-MCNC: 8.7 MG/DL (ref 8.7–10.5)
CHLORIDE SERPL-SCNC: 95 MMOL/L (ref 95–110)
CLARITY UR: CLEAR
CO2 SERPL-SCNC: 18 MMOL/L (ref 23–29)
COLOR UR: YELLOW
CREAT SERPL-MCNC: 0.8 MG/DL (ref 0.5–1.4)
DIFFERENTIAL METHOD BLD: ABNORMAL
EOSINOPHIL # BLD AUTO: 0.2 K/UL (ref 0–0.5)
EOSINOPHIL NFR BLD: 2 % (ref 0–8)
ERYTHROCYTE [DISTWIDTH] IN BLOOD BY AUTOMATED COUNT: 12.5 % (ref 11.5–14.5)
EST. GFR  (NO RACE VARIABLE): >60 ML/MIN/1.73 M^2
GLUCOSE SERPL-MCNC: 56 MG/DL (ref 70–110)
GLUCOSE UR QL STRIP: NEGATIVE
HCT VFR BLD AUTO: 37.5 % (ref 37–48.5)
HGB BLD-MCNC: 13.6 G/DL (ref 12–16)
HGB UR QL STRIP: NEGATIVE
IMM GRANULOCYTES # BLD AUTO: 0.03 K/UL (ref 0–0.04)
IMM GRANULOCYTES NFR BLD AUTO: 0.3 % (ref 0–0.5)
KETONES UR QL STRIP: ABNORMAL
LEUKOCYTE ESTERASE UR QL STRIP: NEGATIVE
LIPASE SERPL-CCNC: 68 U/L (ref 4–60)
LYMPHOCYTES # BLD AUTO: 2.4 K/UL (ref 1–4.8)
LYMPHOCYTES NFR BLD: 26.5 % (ref 18–48)
MAGNESIUM SERPL-MCNC: 1.8 MG/DL (ref 1.6–2.6)
MCH RBC QN AUTO: 33.4 PG (ref 27–31)
MCHC RBC AUTO-ENTMCNC: 36.3 G/DL (ref 32–36)
MCV RBC AUTO: 92 FL (ref 82–98)
MONOCYTES # BLD AUTO: 0.7 K/UL (ref 0.3–1)
MONOCYTES NFR BLD: 7.4 % (ref 4–15)
NEUTROPHILS # BLD AUTO: 5.7 K/UL (ref 1.8–7.7)
NEUTROPHILS NFR BLD: 63 % (ref 38–73)
NITRITE UR QL STRIP: NEGATIVE
NRBC BLD-RTO: 0 /100 WBC
PH UR STRIP: 6 [PH] (ref 5–8)
PLATELET # BLD AUTO: 297 K/UL (ref 150–450)
PMV BLD AUTO: 9.3 FL (ref 9.2–12.9)
POTASSIUM SERPL-SCNC: 3.9 MMOL/L (ref 3.5–5.1)
PROT SERPL-MCNC: 6.9 G/DL (ref 6–8.4)
PROT UR QL STRIP: NEGATIVE
RBC # BLD AUTO: 4.07 M/UL (ref 4–5.4)
SODIUM SERPL-SCNC: 131 MMOL/L (ref 136–145)
SP GR UR STRIP: 1.01 (ref 1–1.03)
TROPONIN I SERPL DL<=0.01 NG/ML-MCNC: <0.006 NG/ML (ref 0–0.03)
URN SPEC COLLECT METH UR: ABNORMAL
UROBILINOGEN UR STRIP-ACNC: NEGATIVE EU/DL
WBC # BLD AUTO: 8.97 K/UL (ref 3.9–12.7)

## 2024-02-10 PROCEDURE — 96367 TX/PROPH/DG ADDL SEQ IV INF: CPT

## 2024-02-10 PROCEDURE — 86803 HEPATITIS C AB TEST: CPT | Performed by: EMERGENCY MEDICINE

## 2024-02-10 PROCEDURE — 81003 URINALYSIS AUTO W/O SCOPE: CPT | Performed by: EMERGENCY MEDICINE

## 2024-02-10 PROCEDURE — 80053 COMPREHEN METABOLIC PANEL: CPT | Performed by: EMERGENCY MEDICINE

## 2024-02-10 PROCEDURE — 96376 TX/PRO/DX INJ SAME DRUG ADON: CPT

## 2024-02-10 PROCEDURE — 93010 ELECTROCARDIOGRAM REPORT: CPT | Mod: ,,, | Performed by: INTERNAL MEDICINE

## 2024-02-10 PROCEDURE — 96361 HYDRATE IV INFUSION ADD-ON: CPT

## 2024-02-10 PROCEDURE — 96365 THER/PROPH/DIAG IV INF INIT: CPT

## 2024-02-10 PROCEDURE — 83735 ASSAY OF MAGNESIUM: CPT | Performed by: EMERGENCY MEDICINE

## 2024-02-10 PROCEDURE — 25000003 PHARM REV CODE 250: Performed by: EMERGENCY MEDICINE

## 2024-02-10 PROCEDURE — 99285 EMERGENCY DEPT VISIT HI MDM: CPT | Mod: 25

## 2024-02-10 PROCEDURE — 84484 ASSAY OF TROPONIN QUANT: CPT | Performed by: EMERGENCY MEDICINE

## 2024-02-10 PROCEDURE — 96375 TX/PRO/DX INJ NEW DRUG ADDON: CPT

## 2024-02-10 PROCEDURE — 83690 ASSAY OF LIPASE: CPT | Performed by: EMERGENCY MEDICINE

## 2024-02-10 PROCEDURE — 74176 CT ABD & PELVIS W/O CONTRAST: CPT | Mod: TC

## 2024-02-10 PROCEDURE — 85025 COMPLETE CBC W/AUTO DIFF WBC: CPT | Performed by: EMERGENCY MEDICINE

## 2024-02-10 PROCEDURE — 87389 HIV-1 AG W/HIV-1&-2 AB AG IA: CPT | Performed by: EMERGENCY MEDICINE

## 2024-02-10 PROCEDURE — 74176 CT ABD & PELVIS W/O CONTRAST: CPT | Mod: 26,,, | Performed by: RADIOLOGY

## 2024-02-10 PROCEDURE — 81025 URINE PREGNANCY TEST: CPT | Performed by: EMERGENCY MEDICINE

## 2024-02-10 PROCEDURE — 63600175 PHARM REV CODE 636 W HCPCS: Performed by: EMERGENCY MEDICINE

## 2024-02-10 PROCEDURE — 93005 ELECTROCARDIOGRAM TRACING: CPT

## 2024-02-10 PROCEDURE — 25000003 PHARM REV CODE 250

## 2024-02-10 RX ORDER — ACETAMINOPHEN 500 MG
1000 TABLET ORAL
Status: COMPLETED | OUTPATIENT
Start: 2024-02-10 | End: 2024-02-10

## 2024-02-10 RX ORDER — ONDANSETRON HYDROCHLORIDE 2 MG/ML
4 INJECTION, SOLUTION INTRAVENOUS
Status: COMPLETED | OUTPATIENT
Start: 2024-02-10 | End: 2024-02-10

## 2024-02-10 RX ORDER — MAGNESIUM SULFATE HEPTAHYDRATE 40 MG/ML
2 INJECTION, SOLUTION INTRAVENOUS
Status: COMPLETED | OUTPATIENT
Start: 2024-02-10 | End: 2024-02-10

## 2024-02-10 RX ORDER — FOLIC ACID 1 MG/1
TABLET ORAL
Status: DISCONTINUED
Start: 2024-02-10 | End: 2024-02-10 | Stop reason: WASHOUT

## 2024-02-10 RX ORDER — THIAMINE HYDROCHLORIDE 100 MG/ML
INJECTION, SOLUTION INTRAMUSCULAR; INTRAVENOUS
Status: DISCONTINUED
Start: 2024-02-10 | End: 2024-02-11 | Stop reason: HOSPADM

## 2024-02-10 RX ORDER — FOLIC ACID 1 MG/1
TABLET ORAL
Status: COMPLETED
Start: 2024-02-10 | End: 2024-02-10

## 2024-02-10 RX ORDER — PHENOBARBITAL SODIUM 65 MG/ML
INJECTION, SOLUTION INTRAMUSCULAR; INTRAVENOUS
Status: DISCONTINUED
Start: 2024-02-10 | End: 2024-02-11 | Stop reason: HOSPADM

## 2024-02-10 RX ADMIN — FOLIC ACID: 5 INJECTION, SOLUTION INTRAMUSCULAR; INTRAVENOUS; SUBCUTANEOUS at 08:02

## 2024-02-10 RX ADMIN — ACETAMINOPHEN 1000 MG: 500 TABLET ORAL at 10:02

## 2024-02-10 RX ADMIN — FOLIC ACID 1000 MCG: 1 TABLET ORAL at 08:02

## 2024-02-10 RX ADMIN — ONDANSETRON 4 MG: 2 INJECTION INTRAMUSCULAR; INTRAVENOUS at 07:02

## 2024-02-10 RX ADMIN — SODIUM CHLORIDE 1000 ML: 9 INJECTION, SOLUTION INTRAVENOUS at 07:02

## 2024-02-10 RX ADMIN — PHENOBARBITAL SODIUM 599.95 MG: 65 INJECTION INTRAMUSCULAR; INTRAVENOUS at 08:02

## 2024-02-10 RX ADMIN — ONDANSETRON 4 MG: 2 INJECTION INTRAMUSCULAR; INTRAVENOUS at 10:02

## 2024-02-10 RX ADMIN — MAGNESIUM SULFATE HEPTAHYDRATE 2 G: 40 INJECTION, SOLUTION INTRAVENOUS at 08:02

## 2024-02-10 NOTE — TELEPHONE ENCOUNTER
"Pt states "um, I'm a full blown alcoholic and I need to go through detox". Advised to go to the ED at this time. Pt VU.   Reason for Disposition   Nursing judgment or information in reference    Protocols used: No Guideline [id number]    "

## 2024-02-11 VITALS
TEMPERATURE: 98 F | DIASTOLIC BLOOD PRESSURE: 75 MMHG | OXYGEN SATURATION: 96 % | BODY MASS INDEX: 30.73 KG/M2 | HEART RATE: 79 BPM | WEIGHT: 180 LBS | RESPIRATION RATE: 18 BRPM | HEIGHT: 64 IN | SYSTOLIC BLOOD PRESSURE: 121 MMHG

## 2024-02-11 LAB
HCV AB SERPL QL IA: REACTIVE
HIV 1+2 AB+HIV1 P24 AG SERPL QL IA: NORMAL

## 2024-02-11 PROCEDURE — 96361 HYDRATE IV INFUSION ADD-ON: CPT

## 2024-02-11 PROCEDURE — 36415 COLL VENOUS BLD VENIPUNCTURE: CPT | Performed by: EMERGENCY MEDICINE

## 2024-02-11 RX ORDER — ONDANSETRON 4 MG/1
4 TABLET, FILM COATED ORAL EVERY 6 HOURS
Qty: 12 TABLET | Refills: 0 | Status: SHIPPED | OUTPATIENT
Start: 2024-02-11

## 2024-02-11 NOTE — ED PROVIDER NOTES
Encounter Date: 2/10/2024       History     Chief Complaint   Patient presents with    Delirium Tremens (DTS)     States no etoh x 18 hrs  hx etog everyday x 3 year   no marajuana x 36 hrs  hx everyday 'as long as I can remember'  Tremors, nausea  states she doesn't want to die  wants in pt therapy.      42 y/o female with alcohol withdrawal. Last drink 18 hours ago, usually drinks an entire box of wine a day.  Patient is interested in alcohol treatment.  She has never seized before with alcohol withdrawal.  Complaining of chest pain nausea vomiting and tremulousness.  Denies hallucinations.    The history is provided by the patient. No  was used.     Review of patient's allergies indicates:   Allergen Reactions    Pcn [penicillins] Anaphylaxis    Shellfish containing products Anaphylaxis    Iodine and iodide containing products Itching and Rash     Past Medical History:   Diagnosis Date    Anxiety     Hyperlipidemia     Hypertension     Migraine headache      Past Surgical History:   Procedure Laterality Date    ANOSCOPY N/A 2/12/2020    Procedure: ANOSCOPY;  Surgeon: Musa Cannon MD;  Location: [facility] OR;  Service: General;  Laterality: N/A;    INCISION AND DRAINAGE OF BUTTOCK N/A 7/14/2021    Procedure: INCISION AND DRAINAGE, BUTTOCK;  Surgeon: Jayjay Swain MD;  Location: [facility] OR;  Service: General;  Laterality: N/A;  JESSEE-RECTAL    INCISION OF PERIRECTAL ABSCESS Left 2/12/2020    Procedure: INCISION, ABSCESS, PERIRECTAL;  Surgeon: Muas Cannon MD;  Location: [facility] OR;  Service: General;  Laterality: Left;     Family History   Problem Relation Age of Onset    Breast cancer Mother     Breast cancer Paternal Grandmother      Social History     Tobacco Use    Smoking status: Every Day     Current packs/day: 1.00     Average packs/day: 1 pack/day for 20.0 years (20.0 ttl pk-yrs)     Types: Cigarettes    Smokeless tobacco: Never   Substance Use Topics    Alcohol use: Yes      Alcohol/week: 50.0 standard drinks of alcohol     Types: 50 Glasses of wine per week    Drug use: Yes     Frequency: 3.0 times per week     Types: Marijuana     Review of Systems   Constitutional:  Negative for fever.   HENT:  Negative for sore throat.    Respiratory:  Negative for shortness of breath.    Cardiovascular:  Positive for chest pain.   Gastrointestinal:  Positive for abdominal pain, nausea and vomiting.   Genitourinary:  Negative for dysuria.   Musculoskeletal:  Negative for back pain.   Skin:  Negative for rash.   Neurological:  Positive for headaches. Negative for weakness.   Hematological:  Does not bruise/bleed easily.   Psychiatric/Behavioral:  The patient is nervous/anxious and is hyperactive.    All other systems reviewed and are negative.      Physical Exam     Initial Vitals   BP Pulse Resp Temp SpO2   02/10/24 1903 02/10/24 1906 02/10/24 1906 02/10/24 1906 02/10/24 1903   110/80 (!) 124 20 98.2 °F (36.8 °C) 100 %      MAP       --                Physical Exam    Nursing note and vitals reviewed.  Constitutional: She appears distressed.   HENT:   Head: Normocephalic and atraumatic.   Eyes: EOM are normal. Pupils are equal, round, and reactive to light.   Neck:   Normal range of motion.  Cardiovascular:  Regular rhythm.           tachycardic   Pulmonary/Chest: Breath sounds normal. No respiratory distress. She has no wheezes.   Abdominal: Abdomen is soft. She exhibits no distension. There is no abdominal tenderness.   Musculoskeletal:         General: Normal range of motion.      Cervical back: Normal range of motion.     Neurological: She is alert and oriented to person, place, and time. She has normal strength. No cranial nerve deficit or sensory deficit. GCS score is 15. GCS eye subscore is 4. GCS verbal subscore is 5. GCS motor subscore is 6.   Tremulous    Skin: Skin is warm. Capillary refill takes less than 2 seconds.         ED Course   Procedures  Labs Reviewed   CBC W/ AUTO DIFFERENTIAL  - Abnormal; Notable for the following components:       Result Value    MCH 33.4 (*)     MCHC 36.3 (*)     All other components within normal limits    Narrative:     Release to patient->Immediate   COMPREHENSIVE METABOLIC PANEL - Abnormal; Notable for the following components:    Sodium 131 (*)     CO2 18 (*)     Glucose 56 (*)     Anion Gap 18 (*)     All other components within normal limits    Narrative:     Release to patient->Immediate   LIPASE - Abnormal; Notable for the following components:    Lipase 68 (*)     All other components within normal limits    Narrative:     Release to patient->Immediate   URINALYSIS, REFLEX TO URINE CULTURE - Abnormal; Notable for the following components:    Ketones, UA Trace (*)     All other components within normal limits    Narrative:     Specimen Source->Urine   TROPONIN I    Narrative:     Release to patient->Immediate   MAGNESIUM    Narrative:     Release to patient->Immediate   PREGNANCY TEST, URINE RAPID    Narrative:     Specimen Source->Urine   URINALYSIS, REFLEX TO URINE CULTURE   HIV 1 / 2 ANTIBODY   HEPATITIS C ANTIBODY     EKG Readings: (Independently Interpreted)   Initial Reading: No STEMI.   EKG done at 7:14 p.m. shows a rate of 83, ID interval 162, QRS duration 64, .  My interpretation of the EKG is this is a normal sinus rhythm without any findings concerning for acute ischemia or electrical abnormality at this time.       Imaging Results              CT Abdomen Pelvis  Without Contrast (Final result)  Result time 02/10/24 20:46:08      Final result by Itz Steiner MD (02/10/24 20:46:08)                   Impression:      Solitary right kidney.  Nonspecific right-sided perinephric edema.  Correlation with urinalysis to exclude infectious process advised.    Decreased attenuation of the hepatic parenchyma which may relate to hepatic steatosis.    Scattered colonic diverticula evidence of acute diverticulitis.    Additional findings as  above.      Electronically signed by: Itz Steiner MD  Date:    02/10/2024  Time:    20:46               Narrative:    EXAMINATION:  CT ABDOMEN PELVIS WITHOUT CONTRAST    CLINICAL HISTORY:  Epigastric pain;    TECHNIQUE:  Low dose axial images, sagittal and coronal reformations were obtained from the lung bases to the pubic symphysis without IV contrast.  Oral contrast was not administered.    COMPARISON:  CT 05/25/2019    FINDINGS:  There are scattered bandlike opacities at the lung bases suggesting atelectasis or scarring.  No significant pleural fluid identified.  The visualized portions of the heart appear normal.    The liver is normal in size.  There is diffuse decreased attenuation of the hepatic parenchyma which may relate to hepatic steatosis.  Further assessment of the hepatic parenchyma is limited without IV contrast.  No radiopaque calculi appreciated within the gallbladder lumen.  There is no intra-or extrahepatic biliary ductal dilatation.    The stomach, spleen, and adrenal glands appear within normal limits.  No significant peripancreatic inflammatory change appreciated.    The right kidney is present and demonstrates nonspecific perinephric edema.  No definite evidence of nephrolithiasis or hydronephrosis.  No definite obstructing calculus identified within the right ureter.  Left kidney again is not identified.  Possible small remnant of atrophic renal tissue noted within the left renal fossa, similar from prior examination.  Urinary bladder appears within normal limits.  Noncontrast appearance of the uterus is unremarkable.  The ovaries are not well delineated.  No significant free fluid in the pelvis.    The abdominal aorta is normal in course and caliber with minimal atherosclerotic calcification along its course.  There is no retroperitoneal hematoma.    The visualized loops of small and large bowel show no evidence of obstruction or inflammation.  There are scattered colonic diverticula  without evidence of acute diverticulitis.  The appendix is not definitively visualized, however no focal inflammatory changes seen at its expected location.  There is no ascites, portal venous gas, or free intraperitoneal air.    Osseous structures appear intact.  The extraperitoneal soft tissues are unremarkable.                                       Medications   phenobarbital (LUMINAL) 599.95 mg in sodium chloride 0.9% 100 mL IVPB (599.95 mg Intravenous Given 2/10/24 2042)   ondansetron injection 4 mg (4 mg Intravenous Given 2/10/24 1923)   sodium chloride 0.9% 1,000 mL with mvi, (ADULT) no.4 with vit K 3,300 unit- 150 mcg/10 mL 10 mL, thiamine 100 mg, folic acid 1 mg infusion ( Intravenous Stopped 2/11/24 0122)   sodium chloride 0.9% bolus 1,000 mL 1,000 mL (0 mLs Intravenous Stopped 2/10/24 2053)   magnesium sulfate 2g in water 50mL IVPB (premix) (0 g Intravenous Stopped 2/10/24 2059)   folic acid (FOLVITE) 1 MG tablet (1,000 mcg  Given 2/10/24 2029)   ondansetron injection 4 mg (4 mg Intravenous Given 2/10/24 2218)   acetaminophen tablet 1,000 mg (1,000 mg Oral Given 2/10/24 2217)     Medical Decision Making  CIWA: 11 on first arrival.     Labs without significant or concerning abnormality with the exception of a very slightly elevated lipase..  CT scan due to patient's pain and slightly elevated lipase shows a single kidney of which the patient is aware.  Urinalysis does not show evidence of infection.    950pm CIWA : 3    Plan will be continued hydration, repeat CIWA and then discharge with outpatient resources.     0020: CIWA: 2    Patient feels significantly better has been tolerating p.o. the entire time she has been here.  She does feel well enough to go home.  She was given resources regarding outpatient alcohol treatment.  Prescription for Zofran provided.  Patient encouraged to return to the emergency department with any new or worsening symptoms.    Amount and/or Complexity of Data Reviewed  Labs:  ordered.  Radiology: ordered.    Risk  OTC drugs.  Prescription drug management.                                      Clinical Impression:  Final diagnoses:  [R07.9] Chest pain  [F10.930] Alcohol withdrawal syndrome without complication (Primary)  [F10.10] Alcohol abuse          ED Disposition Condition    Discharge Stable          ED Prescriptions       Medication Sig Dispense Start Date End Date Auth. Provider    ondansetron (ZOFRAN) 4 MG tablet Take 1 tablet (4 mg total) by mouth every 6 (six) hours. 12 tablet 2/11/2024 -- Ilda Aburto MD          Follow-up Information    None          Ilda Aburto MD  02/11/24 3221

## 2024-02-11 NOTE — ED NOTES
Pt up ambulating to restroom. Pt reports she feels a lot better and does not feel weak and like she is going to pass out. Pt is walking with a steadier gait than she had upon arrival to ED.

## 2024-02-12 ENCOUNTER — TELEPHONE (OUTPATIENT)
Dept: EMERGENCY MEDICINE | Facility: HOSPITAL | Age: 44
End: 2024-02-12

## 2024-02-12 DIAGNOSIS — R76.8 HEPATITIS C ANTIBODY POSITIVE IN BLOOD: Primary | ICD-10-CM

## 2024-02-12 LAB
OHS QRS DURATION: 64 MS
OHS QTC CALCULATION: 437 MS

## 2024-02-13 ENCOUNTER — LAB VISIT (OUTPATIENT)
Dept: LAB | Facility: HOSPITAL | Age: 44
End: 2024-02-13
Attending: NURSE PRACTITIONER

## 2024-02-13 DIAGNOSIS — R76.8 HEPATITIS C ANTIBODY POSITIVE IN BLOOD: ICD-10-CM

## 2024-02-13 PROCEDURE — 87522 HEPATITIS C REVRS TRNSCRPJ: CPT | Performed by: NURSE PRACTITIONER

## 2024-02-13 PROCEDURE — 36415 COLL VENOUS BLD VENIPUNCTURE: CPT | Performed by: NURSE PRACTITIONER

## 2024-02-15 LAB
HCV RNA SERPL QL NAA+PROBE: NOT DETECTED
HCV RNA SPEC NAA+PROBE-ACNC: NOT DETECTED IU/ML

## 2024-07-11 ENCOUNTER — HOSPITAL ENCOUNTER (EMERGENCY)
Facility: HOSPITAL | Age: 44
Discharge: HOME | End: 2024-07-11
Attending: EMERGENCY MEDICINE

## 2024-07-11 VITALS
DIASTOLIC BLOOD PRESSURE: 60 MMHG | HEART RATE: 17 BPM | WEIGHT: 150 LBS | TEMPERATURE: 98 F | SYSTOLIC BLOOD PRESSURE: 110 MMHG | HEIGHT: 66 IN | BODY MASS INDEX: 24.11 KG/M2 | OXYGEN SATURATION: 98 % | RESPIRATION RATE: 17 BRPM

## 2024-07-11 DIAGNOSIS — Z87.892 HISTORY OF ANAPHYLAXIS: ICD-10-CM

## 2024-07-11 DIAGNOSIS — T78.1XXA ALLERGIC REACTION TO FOOD, INITIAL ENCOUNTER: Primary | ICD-10-CM

## 2024-07-11 PROCEDURE — 99284 EMERGENCY DEPT VISIT MOD MDM: CPT | Mod: 25

## 2024-07-11 PROCEDURE — 25000003 PHARM REV CODE 250: Performed by: EMERGENCY MEDICINE

## 2024-07-11 PROCEDURE — 96374 THER/PROPH/DIAG INJ IV PUSH: CPT

## 2024-07-11 PROCEDURE — 63600175 PHARM REV CODE 636 W HCPCS: Performed by: EMERGENCY MEDICINE

## 2024-07-11 PROCEDURE — 96375 TX/PRO/DX INJ NEW DRUG ADDON: CPT

## 2024-07-11 PROCEDURE — 96361 HYDRATE IV INFUSION ADD-ON: CPT

## 2024-07-11 RX ORDER — SODIUM CHLORIDE 9 MG/ML
1000 INJECTION, SOLUTION INTRAVENOUS
Status: COMPLETED | OUTPATIENT
Start: 2024-07-11 | End: 2024-07-11

## 2024-07-11 RX ORDER — EPINEPHRINE 0.3 MG/.3ML
2 INJECTION SUBCUTANEOUS ONCE
Qty: 0.6 ML | Refills: 2 | Status: SHIPPED | OUTPATIENT
Start: 2024-07-11 | End: 2024-07-11

## 2024-07-11 RX ORDER — METHYLPREDNISOLONE SOD SUCC 125 MG
125 VIAL (EA) INJECTION
Status: COMPLETED | OUTPATIENT
Start: 2024-07-11 | End: 2024-07-11

## 2024-07-11 RX ORDER — FAMOTIDINE 10 MG/ML
40 INJECTION INTRAVENOUS
Status: COMPLETED | OUTPATIENT
Start: 2024-07-11 | End: 2024-07-11

## 2024-07-11 RX ORDER — DIPHENHYDRAMINE HYDROCHLORIDE 50 MG/ML
25 INJECTION INTRAMUSCULAR; INTRAVENOUS
Status: COMPLETED | OUTPATIENT
Start: 2024-07-11 | End: 2024-07-11

## 2024-07-11 RX ADMIN — FAMOTIDINE 40 MG: 10 INJECTION, SOLUTION INTRAVENOUS at 11:07

## 2024-07-11 RX ADMIN — SODIUM CHLORIDE 1000 ML: 9 INJECTION, SOLUTION INTRAVENOUS at 11:07

## 2024-07-11 RX ADMIN — LORAZEPAM 1 MG: 2 INJECTION INTRAMUSCULAR; INTRAVENOUS at 11:07

## 2024-07-11 RX ADMIN — METHYLPREDNISOLONE SODIUM SUCCINATE 125 MG: 125 INJECTION, POWDER, FOR SOLUTION INTRAMUSCULAR; INTRAVENOUS at 11:07

## 2024-07-11 RX ADMIN — DIPHENHYDRAMINE HYDROCHLORIDE 25 MG: 50 INJECTION, SOLUTION INTRAMUSCULAR; INTRAVENOUS at 11:07

## 2024-07-11 NOTE — ED PROVIDER NOTES
"Encounter Date: 7/11/2024       History     Chief Complaint   Patient presents with    Allergic Reaction     Pleasant well-developed 43-year-old female comes emergency with complaints of allergic reaction.  The patient states that she contacted some shrimp this morning was washing them.  Then she touched her face.  Shortly thereafter that you develop itching urticaria of the arms bilateral.  This was followed by rapid closing of her throat.  She gave herself an epinephrine pen was concerned that it had not fired properly.  She gave herself a 2nd epinephrine pen.  Called EMS.  Passed out.  Patient arrived to the hospital awake alert and oriented.  AMS as her vital signs have been stable in route.  Her heart rate is 76 upon my initial evaluation.  She appears stable.  There is certainly no sore throat scratchy throat hoarse voice or any symptoms of throat closure.  She has no wheezing or stridor noted.  She has not tachycardic.  She has not tachypneic.  She appears quite stable at this time in fact.  Past medical history of anxiety hyperlipidemia hypertension migraine headaches.  She has had anaphylactic reactions in the past.  Hence the reason for 2 epinephrine pen.  She is requesting something for anxiety saying "my nerves are jumpy right now. "      Review of patient's allergies indicates:   Allergen Reactions    Pcn [penicillins] Anaphylaxis    Shellfish containing products Anaphylaxis    Iodine and iodide containing products Itching and Rash     Past Medical History:   Diagnosis Date    Anxiety     Hyperlipidemia     Hypertension     Migraine headache      Past Surgical History:   Procedure Laterality Date    ANOSCOPY N/A 2/12/2020    Procedure: ANOSCOPY;  Surgeon: Musa Cannon MD;  Location: [facility] OR;  Service: General;  Laterality: N/A;    INCISION AND DRAINAGE OF BUTTOCK N/A 7/14/2021    Procedure: INCISION AND DRAINAGE, BUTTOCK;  Surgeon: Jayjay Swain MD;  Location: [facility] OR;  Service: " General;  Laterality: N/A;  JESSEE-RECTAL    INCISION OF PERIRECTAL ABSCESS Left 2/12/2020    Procedure: INCISION, ABSCESS, PERIRECTAL;  Surgeon: Musa Cannon MD;  Location: [facility] OR;  Service: General;  Laterality: Left;     Family History   Problem Relation Name Age of Onset    Breast cancer Mother      Breast cancer Paternal Grandmother       Social History     Tobacco Use    Smoking status: Every Day     Current packs/day: 1.00     Average packs/day: 1 pack/day for 20.0 years (20.0 ttl pk-yrs)     Types: Cigarettes    Smokeless tobacco: Never   Substance Use Topics    Alcohol use: Yes     Alcohol/week: 50.0 standard drinks of alcohol     Types: 50 Glasses of wine per week    Drug use: Yes     Frequency: 3.0 times per week     Types: Marijuana     Review of Systems   Constitutional:  Negative for fever.   HENT:  Negative for sore throat.    Respiratory:  Negative for shortness of breath.    Cardiovascular:  Negative for chest pain.   Gastrointestinal:  Negative for nausea.   Genitourinary:  Negative for dysuria.   Musculoskeletal:  Negative for back pain.   Skin:  Negative for rash.   Neurological:  Negative for weakness.   Hematological:  Does not bruise/bleed easily.   All other systems reviewed and are negative.      Physical Exam     Initial Vitals   BP Pulse Resp Temp SpO2   07/11/24 1024 07/11/24 1024 07/11/24 1024 07/11/24 1029 07/11/24 1024   129/77 79 20 97.8 °F (36.6 °C) 100 %      MAP       --                Physical Exam    Nursing note and vitals reviewed.  Constitutional: She appears well-developed and well-nourished.   HENT:   Head: Normocephalic and atraumatic.   Eyes: Pupils are equal, round, and reactive to light.   Neck:   No stridor auscultated.  Posterior pharynx is clear.  There is no soft clear edema.  Uvula is midline.  Normal appearing.  Tonsillar edges are very straighten clear.  Posterior tongue is normal.   Normal range of motion.  Cardiovascular:  Normal rate, regular rhythm  and normal heart sounds.           Pulmonary/Chest: Breath sounds normal. No respiratory distress. She has no wheezes. She has no rhonchi. She has no rales.   Abdominal: Abdomen is soft. Bowel sounds are normal.   Musculoskeletal:         General: Normal range of motion.      Cervical back: Normal range of motion.     Neurological: She is alert and oriented to person, place, and time. She has normal strength. GCS score is 15. GCS eye subscore is 4. GCS verbal subscore is 5. GCS motor subscore is 6.   Skin: Skin is warm and dry.   Psychiatric: She has a normal mood and affect. Her behavior is normal. Judgment and thought content normal.         ED Course   Procedures  Labs Reviewed - No data to display       Imaging Results    None          Medications   diphenhydrAMINE injection 25 mg (has no administration in time range)   methylPREDNISolone sodium succinate injection 125 mg (has no administration in time range)   famotidine (PF) injection 40 mg (has no administration in time range)   LORazepam (ATIVAN) injection 1 mg (has no administration in time range)   0.9%  NaCl infusion (has no administration in time range)     Medical Decision Making  Anaphylaxis, throat closure, loss of airway, respiratory distress    Amount and/or Complexity of Data Reviewed  Discussion of management or test interpretation with external provider(s): The patient administered 2 epinephrine pens.  One of the poorly in the other very accurately.  The patient probably had a effect from both of these pins.  At this point heart rate is slow down.  Her respirations are normal.  She was still very jittery from the epinephrine.  Posterior pharynx is quite clear.  Laboratory values are pending.  The patient is very clear to auscultation.  I will continue to watch this patient.  I have given the patient Pepcid, Benadryl, Solu-Medrol.  I will watch this patient for no less than 2 hours.  If she has stable at the time of her discharge patient.  She  "will have a new prescription for epinephrine pen with a refill x2.    Risk  Prescription drug management.                                      Clinical Impression:   ***Please document a Clinical Impression and click the "Refresh" button to refresh your note and automatically pull in before signing.***           "

## 2024-07-11 NOTE — Clinical Note
"Kaylene Ivy"Jose was seen and treated in our emergency department on 7/11/2024.  She may return to work on 07/13/2024.       If you have any questions or concerns, please don't hesitate to call.      Lawrence Wyatt MD"

## 2024-07-11 NOTE — ED TRIAGE NOTES
"Pt presents to the er with c/o allergic reaction. 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 Pt reports she touched her lips without realizing. Pt states her throat feels a little sore. Pt has no airway swelling present. Pt states she feels anxious. Pt reveals that she gave herself two "shots" of epi PTA.  "

## 2024-10-13 ENCOUNTER — HOSPITAL ENCOUNTER (EMERGENCY)
Facility: HOSPITAL | Age: 44
Discharge: HOME | End: 2024-10-13
Attending: EMERGENCY MEDICINE

## 2024-10-13 VITALS
BODY MASS INDEX: 26.66 KG/M2 | SYSTOLIC BLOOD PRESSURE: 132 MMHG | HEART RATE: 70 BPM | RESPIRATION RATE: 18 BRPM | HEIGHT: 65 IN | TEMPERATURE: 99 F | DIASTOLIC BLOOD PRESSURE: 74 MMHG | WEIGHT: 160 LBS | OXYGEN SATURATION: 98 %

## 2024-10-13 DIAGNOSIS — T78.40XA ALLERGIC REACTION, INITIAL ENCOUNTER: Primary | ICD-10-CM

## 2024-10-13 PROCEDURE — 96374 THER/PROPH/DIAG INJ IV PUSH: CPT

## 2024-10-13 PROCEDURE — 96375 TX/PRO/DX INJ NEW DRUG ADDON: CPT

## 2024-10-13 PROCEDURE — 25000003 PHARM REV CODE 250: Performed by: EMERGENCY MEDICINE

## 2024-10-13 PROCEDURE — 63600175 PHARM REV CODE 636 W HCPCS: Performed by: EMERGENCY MEDICINE

## 2024-10-13 PROCEDURE — 99284 EMERGENCY DEPT VISIT MOD MDM: CPT | Mod: 25

## 2024-10-13 RX ORDER — PREDNISONE 20 MG/1
40 TABLET ORAL DAILY
Qty: 10 TABLET | Refills: 0 | Status: SHIPPED | OUTPATIENT
Start: 2024-10-13 | End: 2024-10-18

## 2024-10-13 RX ORDER — METHYLPREDNISOLONE SOD SUCC 125 MG
125 VIAL (EA) INJECTION
Status: COMPLETED | OUTPATIENT
Start: 2024-10-13 | End: 2024-10-13

## 2024-10-13 RX ORDER — FAMOTIDINE 10 MG/ML
20 INJECTION INTRAVENOUS
Status: COMPLETED | OUTPATIENT
Start: 2024-10-13 | End: 2024-10-13

## 2024-10-13 RX ORDER — DIPHENHYDRAMINE HYDROCHLORIDE 50 MG/ML
25 INJECTION INTRAMUSCULAR; INTRAVENOUS
Status: COMPLETED | OUTPATIENT
Start: 2024-10-13 | End: 2024-10-13

## 2024-10-13 RX ADMIN — METHYLPREDNISOLONE SODIUM SUCCINATE 125 MG: 125 INJECTION, POWDER, FOR SOLUTION INTRAMUSCULAR; INTRAVENOUS at 07:10

## 2024-10-13 RX ADMIN — FAMOTIDINE 20 MG: 10 INJECTION, SOLUTION INTRAVENOUS at 07:10

## 2024-10-13 RX ADMIN — DIPHENHYDRAMINE HYDROCHLORIDE 25 MG: 50 INJECTION INTRAMUSCULAR; INTRAVENOUS at 07:10

## 2024-10-14 NOTE — ED NOTES
"Patient states she is feeling better now and "returning to her normal". Denies any palpitations now. States they have resolved. Vitally wnl. Denies any SOB, n/v/d. Will continue to closely monitor for rebound affect. Denies any needs at this time.   "

## 2024-10-14 NOTE — ED PROVIDER NOTES
Encounter Date: 10/13/2024       History     Chief Complaint   Patient presents with    Allergic Reaction     Throat felt scratchy post ingesting shellfish. C/o trouble breathing. Gave themself 2 epi pens prior to calling EMS     Patient presents to the emergency department for evaluation of allergic reaction.  Patient states she was at a restaurant and ordered food and apparently warned them that she had a seafood allergy not give her anything that been exposed to seafood.  She states she had a spinach dip and began to experience scratching in her throat shortness of breath and wheezing.  Patient states that she gave herself an EpiPen in each thigh and call the ambulance.  At time of arrival she complains of palpitations and shortness of breath.  She denies any wheezing.  She denies any swelling of her throat.  Patient denies any fevers or chills.  She denies any chest pain.  She denies any other complaints.    The history is provided by the patient.     Review of patient's allergies indicates:   Allergen Reactions    Pcn [penicillins] Anaphylaxis    Shellfish containing products Anaphylaxis    Iodine and iodide containing products Itching and Rash     Past Medical History:   Diagnosis Date    Anxiety     Hyperlipidemia     Hypertension     Migraine headache      Past Surgical History:   Procedure Laterality Date    ANOSCOPY N/A 2/12/2020    Procedure: ANOSCOPY;  Surgeon: Musa Cannon MD;  Location: [facility] OR;  Service: General;  Laterality: N/A;    INCISION AND DRAINAGE OF BUTTOCK N/A 7/14/2021    Procedure: INCISION AND DRAINAGE, BUTTOCK;  Surgeon: Jayjay Swain MD;  Location: [facility] OR;  Service: General;  Laterality: N/A;  JESSEE-RECTAL    INCISION OF PERIRECTAL ABSCESS Left 2/12/2020    Procedure: INCISION, ABSCESS, PERIRECTAL;  Surgeon: Musa Cannon MD;  Location: [facility] OR;  Service: General;  Laterality: Left;     Family History   Problem Relation Name Age of Onset    Breast cancer Mother       Breast cancer Paternal Grandmother       Social History     Tobacco Use    Smoking status: Every Day     Current packs/day: 1.00     Average packs/day: 1 pack/day for 20.0 years (20.0 ttl pk-yrs)     Types: Cigarettes    Smokeless tobacco: Never   Substance Use Topics    Alcohol use: Yes     Alcohol/week: 50.0 standard drinks of alcohol     Types: 50 Glasses of wine per week    Drug use: Yes     Frequency: 3.0 times per week     Types: Marijuana     Review of Systems   Constitutional:  Negative for activity change, appetite change, chills and fever.   HENT:  Negative for congestion, ear discharge, ear pain, nosebleeds, sore throat and voice change.    Eyes:  Negative for photophobia, pain and discharge.   Respiratory:  Negative for cough, chest tightness, shortness of breath and wheezing.    Cardiovascular:  Positive for palpitations. Negative for chest pain.   Gastrointestinal:  Negative for abdominal pain, constipation, nausea and vomiting.   Genitourinary:  Negative for dysuria, flank pain, frequency and urgency.   Musculoskeletal:  Negative for back pain and neck pain.   Skin:  Negative for rash and wound.   Neurological:  Negative for dizziness, seizures, weakness and headaches.       Physical Exam     Initial Vitals   BP Pulse Resp Temp SpO2   10/13/24 1941 10/13/24 1941 10/13/24 1942 10/13/24 1954 10/13/24 1941   139/82 78 13 98.7 °F (37.1 °C) 98 %      MAP       --                Physical Exam    Nursing note and vitals reviewed.  Constitutional: She appears well-developed and well-nourished.   HENT:   Head: Normocephalic and atraumatic.   Right Ear: External ear normal.   Left Ear: External ear normal.   Nose: Nose normal. Mouth/Throat: Oropharynx is clear and moist.   Eyes: Conjunctivae and EOM are normal. Pupils are equal, round, and reactive to light.   Neck: Neck supple. No tracheal deviation present.   Normal range of motion.  Cardiovascular:  Normal rate, regular rhythm and normal heart sounds.            Patient is not tachycardic at time of evaluation.   Pulmonary/Chest: Breath sounds normal. She has no wheezes.   Abdominal: Abdomen is soft. Bowel sounds are normal. There is no abdominal tenderness.   Musculoskeletal:         General: No tenderness. Normal range of motion.      Cervical back: Normal range of motion and neck supple.     Neurological: She is alert and oriented to person, place, and time. She has normal reflexes.   Skin: Skin is warm and dry. Capillary refill takes less than 2 seconds. No rash noted.         ED Course   Procedures  Labs Reviewed - No data to display       Imaging Results    None          Medications   methylPREDNISolone sodium succinate injection 125 mg (125 mg Intravenous Given 10/13/24 1953)   famotidine (PF) injection 20 mg (20 mg Intravenous Given 10/13/24 1953)   diphenhydrAMINE injection 25 mg (25 mg Intravenous Given 10/13/24 1953)     Medical Decision Making  History is obtained from the patient and EMS.  Differential diagnosis includes, but is not limited to, cardiac arrhythmias/electrolyte abnormality/allergic reaction/medication side-effect  Patient has no limitation to healthcare access      Patient re-evaluated at 9:00 p.m..  Patient is sleeping comfortably in bed no acute distress.  We will discharge patient home.    Amount and/or Complexity of Data Reviewed  ECG/medicine tests: ordered and independent interpretation performed.     Details: EKG reveals a normal sinus rhythm with a heart rate of 72.  There is no evidence of acute ischemic change.  There is no axis deviation noted.    Risk  Prescription drug management.                                      Clinical Impression:  Final diagnoses:  [T78.40XA] Allergic reaction, initial encounter (Primary)          ED Disposition Condition    Discharge Stable          ED Prescriptions       Medication Sig Dispense Start Date End Date Auth. Provider    predniSONE (DELTASONE) 20 MG tablet Take 2 tablets (40 mg total) by mouth  once daily. for 5 days 10 tablet 10/13/2024 10/18/2024 Michel Moreno, DO          Follow-up Information       Follow up With Specialties Details Why Contact Info    Primary care physician of choice  Schedule an appointment as soon as possible for a visit                Michel Moreno, DO  10/13/24 2100

## 2024-10-14 NOTE — ED NOTES
Resting comfortably at this time. No relapse of symptoms from reaction noted. Remains vitally stable. Denies any needs at this time.

## 2024-10-14 NOTE — ED NOTES
D/c instructions provided to patient. RX provided for oral steroids with instructions for use. Follow up explained and instructed to return for new or worsening symptoms. Verbalized to avoid seafood in the future and to return for any new or worsening/returning symptoms. All questions answered. Paperwork provided. Remains asymptomatic at this time and vitally stable. Escorted to lobby to complete d/c process.

## 2024-10-17 LAB
OHS QRS DURATION: 68 MS
OHS QTC CALCULATION: 418 MS

## 2025-01-14 ENCOUNTER — HOSPITAL ENCOUNTER (EMERGENCY)
Facility: HOSPITAL | Age: 45
Discharge: HOME | End: 2025-01-15
Attending: EMERGENCY MEDICINE

## 2025-01-14 DIAGNOSIS — S20.223A: Primary | ICD-10-CM
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
## 2025-01-14 LAB
B-HCG UR QL: NEGATIVE
BILIRUB UR QL STRIP: NEGATIVE
CLARITY UR: CLEAR
COLOR UR: YELLOW
GLUCOSE UR QL STRIP: NEGATIVE
HGB UR QL STRIP: ABNORMAL
KETONES UR QL STRIP: NEGATIVE
LEUKOCYTE ESTERASE UR QL STRIP: NEGATIVE
NITRITE UR QL STRIP: NEGATIVE
PH UR STRIP: 6 [PH] (ref 5–8)
PROT UR QL STRIP: NEGATIVE
SP GR UR STRIP: 1.01 (ref 1–1.03)
URN SPEC COLLECT METH UR: ABNORMAL
UROBILINOGEN UR STRIP-ACNC: NEGATIVE EU/DL

## 2025-01-14 PROCEDURE — 72110 X-RAY EXAM L-2 SPINE 4/>VWS: CPT | Mod: TC

## 2025-01-14 PROCEDURE — 71046 X-RAY EXAM CHEST 2 VIEWS: CPT | Mod: TC

## 2025-01-14 PROCEDURE — 72110 X-RAY EXAM L-2 SPINE 4/>VWS: CPT | Mod: 26,,, | Performed by: RADIOLOGY

## 2025-01-14 PROCEDURE — 99284 EMERGENCY DEPT VISIT MOD MDM: CPT | Mod: 25

## 2025-01-14 PROCEDURE — 71046 X-RAY EXAM CHEST 2 VIEWS: CPT | Mod: 26,,, | Performed by: RADIOLOGY

## 2025-01-14 PROCEDURE — 81003 URINALYSIS AUTO W/O SCOPE: CPT | Performed by: EMERGENCY MEDICINE

## 2025-01-14 PROCEDURE — 81025 URINE PREGNANCY TEST: CPT | Performed by: EMERGENCY MEDICINE

## 2025-01-14 NOTE — Clinical Note
"Kaylene Ivy" Jose was seen and treated in our emergency department on 1/14/2025.  She may return to work on 01/17/2025.       If you have any questions or concerns, please don't hesitate to call.      SHASHANK Kaur RN    "

## 2025-01-15 VITALS
TEMPERATURE: 98 F | RESPIRATION RATE: 20 BRPM | WEIGHT: 150 LBS | DIASTOLIC BLOOD PRESSURE: 74 MMHG | HEIGHT: 65 IN | OXYGEN SATURATION: 99 % | HEART RATE: 94 BPM | SYSTOLIC BLOOD PRESSURE: 123 MMHG | BODY MASS INDEX: 24.99 KG/M2

## 2025-01-15 PROCEDURE — 25000003 PHARM REV CODE 250: Performed by: EMERGENCY MEDICINE

## 2025-01-15 RX ORDER — HYDROCODONE BITARTRATE AND ACETAMINOPHEN 10; 325 MG/1; MG/1
1 TABLET ORAL
Status: COMPLETED | OUTPATIENT
Start: 2025-01-15 | End: 2025-01-15

## 2025-01-15 RX ORDER — HYDROCODONE BITARTRATE AND ACETAMINOPHEN 10; 325 MG/1; MG/1
1 TABLET ORAL EVERY 6 HOURS PRN
Qty: 12 TABLET | Refills: 0 | Status: SHIPPED | OUTPATIENT
Start: 2025-01-15

## 2025-01-15 RX ADMIN — HYDROCODONE BITARTRATE AND ACETAMINOPHEN 1 TABLET: 10; 325 TABLET ORAL at 12:01

## 2025-01-15 NOTE — DISCHARGE INSTRUCTIONS
Continue with alternating doses of Tylenol and Motrin.  You can take up to 3 g Tylenol daily, and you can take 800 mg of ibuprofen 3 times daily.  Take ibuprofen with food to help prevent stomach upset.  For unrelieved pain, take [place].  Remember Norco has 325 mg Tylenol in it so be careful to the factor it into your daily Tylenol limit.  Do not drive or drink alcohol after taking Norco.  Follow-up with your doctor tomorrow.  Return here as needed or if worse in any way.

## 2025-01-15 NOTE — ED NOTES
"Pt presents to ED via POV for fall that occurred yesterday evening.  Pt reports falling down 18 steps at home.  Bruising  and tenderness noted back.  Pt reports attempting to relieve pain with ibuprofen at home with no relief.  States, " I think I might have broken my ribs."  Reports difficulty taking in deep breath and states a cough has developed. No other concerns voiced at this time.  NAD noted.   "

## 2025-01-15 NOTE — ED NOTES
Pt DC instructions reviewed with pt and pt medicated per MAR.  Pt verbalized understanding of DC instructions.  Pt questions answered to pt satisfaction.  No other questions or needs voiced at this time.  NAD noted.  Pt ambulatory out of department at this time.

## 2025-01-15 NOTE — ED PROVIDER NOTES
Encounter Date: 1/14/2025       History     Chief Complaint   Patient presents with    Fall     Fall last night down 18 steps at home.  Back pain.  Left rib pain worse than right.  Bruising noted to bilateral flanks.      44-year-old female, here from home via private vehicle, driven by her , for evaluation and treatment of injuries received when she slipped down some steps last night.  She states she has bilateral flank pain which is made worse by palpation, and by certain movements and positions.  She has taken some sporadic Tylenol and Motrin for the pain without relief.  Denies any hematuria or dysuria.  No shortness of breath or cough, no hemoptysis.  Denies any other injuries or complaints.      Review of patient's allergies indicates:   Allergen Reactions    Pcn [penicillins] Anaphylaxis    Shellfish containing products Anaphylaxis    Iodine and iodide containing products Itching and Rash     Past Medical History:   Diagnosis Date    Anxiety     Hyperlipidemia     Hypertension     Migraine headache      Past Surgical History:   Procedure Laterality Date    ANOSCOPY N/A 2/12/2020    Procedure: ANOSCOPY;  Surgeon: Musa Cannon MD;  Location: [facility] OR;  Service: General;  Laterality: N/A;    INCISION AND DRAINAGE OF BUTTOCK N/A 7/14/2021    Procedure: INCISION AND DRAINAGE, BUTTOCK;  Surgeon: Jayjay Swain MD;  Location: [facility] OR;  Service: General;  Laterality: N/A;  JESSEE-RECTAL    INCISION OF PERIRECTAL ABSCESS Left 2/12/2020    Procedure: INCISION, ABSCESS, PERIRECTAL;  Surgeon: Musa Cannon MD;  Location: [facility] OR;  Service: General;  Laterality: Left;     Family History   Problem Relation Name Age of Onset    Breast cancer Mother      Breast cancer Paternal Grandmother       Social History     Tobacco Use    Smoking status: Every Day     Current packs/day: 1.00     Average packs/day: 1 pack/day for 20.0 years (20.0 ttl pk-yrs)     Types: Cigarettes    Smokeless tobacco: Never    Substance Use Topics    Alcohol use: Yes     Alcohol/week: 50.0 standard drinks of alcohol     Types: 50 Glasses of wine per week    Drug use: Yes     Frequency: 3.0 times per week     Types: Marijuana     Review of Systems   Musculoskeletal:  Positive for arthralgias (Complains of pain in both flanks secondary to injuries received in a fall).   All other systems reviewed and are negative.      Physical Exam     Initial Vitals [01/14/25 2151]   BP Pulse Resp Temp SpO2   125/76 91 20 97.6 °F (36.4 °C) 100 %      MAP       --         Physical Exam    Nursing note and vitals reviewed.  Constitutional: She appears well-developed and well-nourished. She is not diaphoretic. No distress.   HENT:   Head: Normocephalic and atraumatic.   Nose: Nose normal. Mouth/Throat: Oropharynx is clear and moist. No oropharyngeal exudate.   Eyes: Conjunctivae and EOM are normal. Pupils are equal, round, and reactive to light. No scleral icterus.   Neck: Neck supple. No JVD present.   Normal range of motion.  Cardiovascular:  Normal rate, regular rhythm, normal heart sounds and intact distal pulses.           No murmur heard.  Pulmonary/Chest: Breath sounds normal. No stridor. No respiratory distress. She has no wheezes. She has no rhonchi. She has no rales.   Abdominal: Abdomen is soft. Bowel sounds are normal. She exhibits no distension. There is no abdominal tenderness.   Musculoskeletal:         General: Tenderness present. No edema. Normal range of motion.      Cervical back: Normal range of motion and neck supple.      Comments: Examination of the patient's back reveals abrasions over both flanks.  No tenderness to palpation of the spine itself, but she has significant tenderness in the areas of the abrasions.  No crepitus noted in the ribs.  No step-offs etc..  No obvious hematomas     Neurological: She is alert and oriented to person, place, and time. She has normal strength. No cranial nerve deficit or sensory deficit. GCS score  is 15. GCS eye subscore is 4. GCS verbal subscore is 5. GCS motor subscore is 6.   Skin: Skin is warm and dry. Capillary refill takes less than 2 seconds. No rash noted. No erythema.   Psychiatric: She has a normal mood and affect. Her behavior is normal.         ED Course   Procedures  Labs Reviewed   URINALYSIS, REFLEX TO URINE CULTURE - Abnormal       Result Value    Specimen UA Urine, Clean Catch      Color, UA Yellow      Appearance, UA Clear      pH, UA 6.0      Specific Gravity, UA 1.015      Protein, UA Negative      Glucose, UA Negative      Ketones, UA Negative      Bilirubin (UA) Negative      Occult Blood UA Trace (*)     Nitrite, UA Negative      Urobilinogen, UA Negative      Leukocytes, UA Negative      Narrative:     Preferred Collection Type->Urine, Clean Catch  Specimen Source->Urine   PREGNANCY TEST, URINE RAPID    Preg Test, Ur Negative      Narrative:     Specimen Source->Urine          Imaging Results              X-Ray Lumbar Spine 5 View (Final result)  Result time 01/14/25 23:49:44      Final result by Peter Fernandez MD (01/14/25 23:49:44)                   Impression:      As above      Electronically signed by: Peter eFrnandez  Date:    01/14/2025  Time:    23:49               Narrative:    EXAMINATION:  XR LUMBAR SPINE COMPLETE 5 VIEW    CLINICAL HISTORY:  Pain    TECHNIQUE:  AP, lateral, spot and bilateral oblique views of the lumbar spine were performed.    COMPARISON:  None    FINDINGS:  Mild dextrocurvature lumbar spine.  No acute fracture or listhesis.  Mild lower lumbar facet arthrosis.                                       X-Ray Chest PA And Lateral (Final result)  Result time 01/14/25 23:47:11      Final result by Peter Fernandez MD (01/14/25 23:47:11)                   Impression:      No acute findings      Electronically signed by: Peter Fernandez  Date:    01/14/2025  Time:    23:47               Narrative:    EXAMINATION:  XR CHEST PA AND  LATERAL    CLINICAL HISTORY:  Unspecified fall, initial encounter    TECHNIQUE:  PA and lateral views of the chest were performed.    COMPARISON:  12/04/2022    FINDINGS:  Lungs are clear. No focal consolidation. No pleural effusion. No pneumothorax. Normal heart size.                                    X-Rays:   Independently Interpreted Readings:   Other Readings:  X-ray lumbar spine and x-ray chest, PA and lateral, personally reviewed by me, shows no evidence of lumbar fractures, no evidence of rib fractures or thoracic spine fractures.     Medications   HYDROcodone-acetaminophen  mg per tablet 1 tablet (has no administration in time range)     Medical Decision Making  Differential includes rib fractures, pneumothorax, contusions, spinal fractures, etc.    X-rays were negative for any evidence of any fractures.  He has abrasions over both flanks, and she states this where her pain is located.  Breath sounds are clear.  I believe she is suffering from contusions and abrasions only.  Will give 1 Norco here prior to discharge, and will give a prescription to supplement over-the-counter Tylenol and Motrin at home.  Patient will follow-up with her PCP, and return here as needed or if worse in any way.    Amount and/or Complexity of Data Reviewed  Radiology: ordered.    Risk  Prescription drug management.                                      Clinical Impression:  Final diagnoses:  [W19.XXXA] Fall  [W19.XXXA, Y92.009] Fall at home, initial encounter  [S20.223A] Contusion of bilateral back wall of thorax, initial encounter (Primary)          ED Disposition Condition    Discharge Stable          ED Prescriptions    None       Follow-up Information       Follow up With Specialties Details Why Contact Info    Your primary care provider  Today      [facility] - Emergency Dept Emergency Medicine  If symptoms worsen [address] [facility] [address] [phone]             Kwasi Yost,  MD  01/15/25 0032

## 2025-02-26 ENCOUNTER — HOSPITAL ENCOUNTER (OUTPATIENT)
Dept: RADIOLOGY | Facility: HOSPITAL | Age: 45
Discharge: HOME | End: 2025-02-26
Attending: NURSE PRACTITIONER

## 2025-02-26 DIAGNOSIS — M25.551 PAIN IN RIGHT HIP: ICD-10-CM

## 2025-02-26 DIAGNOSIS — Z12.31 ENCOUNTER FOR SCREENING MAMMOGRAM FOR MALIGNANT NEOPLASM OF BREAST: ICD-10-CM

## 2025-02-26 PROCEDURE — 73502 X-RAY EXAM HIP UNI 2-3 VIEWS: CPT | Mod: 26,RT,, | Performed by: RADIOLOGY

## 2025-02-26 PROCEDURE — 77063 BREAST TOMOSYNTHESIS BI: CPT | Mod: 26,,, | Performed by: RADIOLOGY

## 2025-02-26 PROCEDURE — 77067 SCR MAMMO BI INCL CAD: CPT | Mod: TC

## 2025-02-26 PROCEDURE — 77067 SCR MAMMO BI INCL CAD: CPT | Mod: 26,,, | Performed by: RADIOLOGY

## 2025-02-26 PROCEDURE — 73502 X-RAY EXAM HIP UNI 2-3 VIEWS: CPT | Mod: TC,RT

## 2025-02-28 ENCOUNTER — HOSPITAL ENCOUNTER (OUTPATIENT)
Dept: RADIOLOGY | Facility: HOSPITAL | Age: 45
Discharge: HOME | End: 2025-02-28
Attending: NURSE PRACTITIONER
Payer: COMMERCIAL

## 2025-02-28 DIAGNOSIS — R92.8 OTHER ABNORMAL AND INCONCLUSIVE FINDINGS ON DIAGNOSTIC IMAGING OF BREAST: ICD-10-CM

## 2025-04-12 ENCOUNTER — HOSPITAL ENCOUNTER (EMERGENCY)
Facility: HOSPITAL | Age: 45
Discharge: HOME | End: 2025-04-12
Attending: EMERGENCY MEDICINE

## 2025-04-12 VITALS
TEMPERATURE: 98 F | OXYGEN SATURATION: 97 % | DIASTOLIC BLOOD PRESSURE: 62 MMHG | HEART RATE: 58 BPM | BODY MASS INDEX: 29.99 KG/M2 | SYSTOLIC BLOOD PRESSURE: 116 MMHG | RESPIRATION RATE: 20 BRPM | HEIGHT: 65 IN | WEIGHT: 180 LBS

## 2025-04-12 DIAGNOSIS — R55 SYNCOPE: ICD-10-CM

## 2025-04-12 DIAGNOSIS — T50.905A MEDICATION ADVERSE EFFECT, INITIAL ENCOUNTER: Primary | ICD-10-CM

## 2025-04-12 LAB
ABSOLUTE EOSINOPHIL (OHS): 0.2 K/UL
ABSOLUTE MONOCYTE (OHS): 0.69 K/UL (ref 0.3–1)
ABSOLUTE NEUTROPHIL COUNT (OHS): 4.57 K/UL (ref 1.8–7.7)
ALBUMIN SERPL BCP-MCNC: 3.9 G/DL (ref 3.5–5.2)
ALP SERPL-CCNC: 59 UNIT/L (ref 40–150)
ALT SERPL W/O P-5'-P-CCNC: 18 UNIT/L (ref 10–44)
ANION GAP (OHS): 9 MMOL/L (ref 8–16)
AST SERPL-CCNC: 17 UNIT/L (ref 11–45)
B-HCG UR QL: NEGATIVE
BASOPHILS # BLD AUTO: 0.07 K/UL
BASOPHILS NFR BLD AUTO: 1 %
BILIRUB SERPL-MCNC: 0.6 MG/DL (ref 0.1–1)
BILIRUB UR QL STRIP.AUTO: NEGATIVE
BUN SERPL-MCNC: 21 MG/DL (ref 6–20)
CALCIUM SERPL-MCNC: 9.3 MG/DL (ref 8.7–10.5)
CHLORIDE SERPL-SCNC: 104 MMOL/L (ref 95–110)
CLARITY UR: CLEAR
CO2 SERPL-SCNC: 23 MMOL/L (ref 23–29)
COLOR UR AUTO: YELLOW
CREAT SERPL-MCNC: 0.9 MG/DL (ref 0.5–1.4)
ERYTHROCYTE [DISTWIDTH] IN BLOOD BY AUTOMATED COUNT: 12.4 % (ref 11.5–14.5)
GFR SERPLBLD CREATININE-BSD FMLA CKD-EPI: >60 ML/MIN/1.73/M2
GLUCOSE SERPL-MCNC: 85 MG/DL (ref 70–110)
GLUCOSE UR QL STRIP: NEGATIVE
HCT VFR BLD AUTO: 38.9 % (ref 37–48.5)
HGB BLD-MCNC: 13.4 GM/DL (ref 12–16)
HGB UR QL STRIP: NEGATIVE
IMM GRANULOCYTES # BLD AUTO: 0.01 K/UL (ref 0–0.04)
IMM GRANULOCYTES NFR BLD AUTO: 0.1 % (ref 0–0.5)
KETONES UR QL STRIP: NEGATIVE
LEUKOCYTE ESTERASE UR QL STRIP: NEGATIVE
LYMPHOCYTES # BLD AUTO: 1.8 K/UL (ref 1–4.8)
MAGNESIUM SERPL-MCNC: 1.8 MG/DL (ref 1.6–2.6)
MCH RBC QN AUTO: 31.2 PG (ref 27–31)
MCHC RBC AUTO-ENTMCNC: 34.4 G/DL (ref 32–36)
MCV RBC AUTO: 91 FL (ref 82–98)
NITRITE UR QL STRIP: NEGATIVE
NUCLEATED RBC (/100WBC) (OHS): 0 /100 WBC
PH UR STRIP: 7 [PH]
PHOSPHATE SERPL-MCNC: 2.7 MG/DL (ref 2.7–4.5)
PLATELET # BLD AUTO: 355 K/UL (ref 150–450)
PMV BLD AUTO: 10.4 FL (ref 9.2–12.9)
POCT GLUCOSE: 81 MG/DL (ref 70–110)
POTASSIUM SERPL-SCNC: 4.3 MMOL/L (ref 3.5–5.1)
PROT SERPL-MCNC: 7.1 GM/DL (ref 6–8.4)
PROT UR QL STRIP: NEGATIVE
RBC # BLD AUTO: 4.29 M/UL (ref 4–5.4)
RELATIVE EOSINOPHIL (OHS): 2.7 %
RELATIVE LYMPHOCYTE (OHS): 24.5 % (ref 18–48)
RELATIVE MONOCYTE (OHS): 9.4 % (ref 4–15)
RELATIVE NEUTROPHIL (OHS): 62.3 % (ref 38–73)
SODIUM SERPL-SCNC: 136 MMOL/L (ref 136–145)
SP GR UR STRIP: 1.01
UROBILINOGEN UR STRIP-ACNC: NEGATIVE EU/DL
WBC # BLD AUTO: 7.34 K/UL (ref 3.9–12.7)

## 2025-04-12 PROCEDURE — 93005 ELECTROCARDIOGRAM TRACING: CPT | Performed by: INTERNAL MEDICINE

## 2025-04-12 PROCEDURE — 81003 URINALYSIS AUTO W/O SCOPE: CPT | Performed by: EMERGENCY MEDICINE

## 2025-04-12 PROCEDURE — 85025 COMPLETE CBC W/AUTO DIFF WBC: CPT | Performed by: EMERGENCY MEDICINE

## 2025-04-12 PROCEDURE — 84100 ASSAY OF PHOSPHORUS: CPT | Performed by: EMERGENCY MEDICINE

## 2025-04-12 PROCEDURE — 81025 URINE PREGNANCY TEST: CPT | Performed by: EMERGENCY MEDICINE

## 2025-04-12 PROCEDURE — 83735 ASSAY OF MAGNESIUM: CPT | Performed by: EMERGENCY MEDICINE

## 2025-04-12 PROCEDURE — 82247 BILIRUBIN TOTAL: CPT | Performed by: EMERGENCY MEDICINE

## 2025-04-12 PROCEDURE — 25000003 PHARM REV CODE 250: Performed by: EMERGENCY MEDICINE

## 2025-04-12 PROCEDURE — 82962 GLUCOSE BLOOD TEST: CPT

## 2025-04-12 PROCEDURE — 99284 EMERGENCY DEPT VISIT MOD MDM: CPT | Mod: 25

## 2025-04-12 PROCEDURE — 96360 HYDRATION IV INFUSION INIT: CPT

## 2025-04-12 RX ORDER — PROPRANOLOL HYDROCHLORIDE 60 MG/1
1 CAPSULE, EXTENDED RELEASE ORAL
COMMUNITY
Start: 2025-03-24 | End: 2025-04-12 | Stop reason: SINTOL

## 2025-04-12 RX ORDER — MONTELUKAST SODIUM 10 MG/1
TABLET ORAL
COMMUNITY
Start: 2024-06-17

## 2025-04-12 RX ORDER — LISINOPRIL AND HYDROCHLOROTHIAZIDE 20; 25 MG/1; MG/1
TABLET ORAL
COMMUNITY
Start: 2025-02-25 | End: 2025-04-12 | Stop reason: SINTOL

## 2025-04-12 RX ADMIN — SODIUM CHLORIDE 1000 ML: 9 INJECTION, SOLUTION INTRAVENOUS at 01:04

## 2025-04-12 NOTE — ED PROVIDER NOTES
History     Chief Complaint   Patient presents with    Loss of Consciousness     Reports syncopal episode in hot shower approx 30 mins PTA.     HPI:  Kaylene Garcia is a 44 y.o. female with PMH as below who presents to the [facility] emergency department for evaluation of with syncope and collapse in shower 30 min prior to arrival. She hit the back of her head which she says only hurts minimally. No N/V. She states she's had near-syncope frequently recently, but this is the first full syncopal episode. She used to be on several heavy antihypertensives but after quitting alcohol over the last few months has had decreasing BP. Her doctor had recommended cutting her lisinopril-HCTZ in half recently for this reason. Patient feels dizzy with standing.       PCP: Josefina Hartman (Inactive)    Review of patient's allergies indicates:   Allergen Reactions    Pcn [penicillins] Anaphylaxis    Shellfish containing products Anaphylaxis    Iodine Rash    Iodine and iodide containing products Itching and Rash      Past Medical History:   Diagnosis Date    Anxiety     Hyperlipidemia     Hypertension     Migraine headache      Past Surgical History:   Procedure Laterality Date    ANOSCOPY N/A 02/12/2020    Procedure: ANOSCOPY;  Surgeon: Musa Cannon MD;  Location: [facility] OR;  Service: General;  Laterality: N/A;    BREAST BIOPSY Right 2022    INCISION AND DRAINAGE OF BUTTOCK N/A 07/14/2021    Procedure: INCISION AND DRAINAGE, BUTTOCK;  Surgeon: Jayjay Swain MD;  Location: [facility] OR;  Service: General;  Laterality: N/A;  JESSEE-RECTAL    INCISION OF PERIRECTAL ABSCESS Left 02/12/2020    Procedure: INCISION, ABSCESS, PERIRECTAL;  Surgeon: Musa Cannon MD;  Location: [facility] OR;  Service: General;  Laterality: Left;       Family History   Problem Relation Name Age of Onset    Breast cancer Mother      Breast cancer Paternal Grandmother       Social History     Tobacco Use    Smoking status: Every Day     Current  packs/day: 1.00     Average packs/day: 1 pack/day for 20.0 years (20.0 ttl pk-yrs)     Types: Cigarettes    Smokeless tobacco: Never   Substance and Sexual Activity    Alcohol use: Yes     Alcohol/week: 50.0 standard drinks of alcohol     Types: 50 Glasses of wine per week    Drug use: Yes     Frequency: 3.0 times per week     Types: Marijuana    Sexual activity: Yes     Partners: Male     Birth control/protection: None      Review of Systems     Review of Systems   Constitutional: Negative.  Negative for fever.   HENT: Negative.     Eyes: Negative.    Respiratory: Negative.  Negative for shortness of breath.    Cardiovascular: Negative.  Negative for chest pain and palpitations.   Gastrointestinal: Negative.    Endocrine: Negative.    Genitourinary: Negative.    Musculoskeletal: Negative.    Skin: Negative.    Allergic/Immunologic: Negative.    Neurological: Negative.    Hematological: Negative.    Psychiatric/Behavioral: Negative.     All other systems reviewed and are negative.       Physical Exam     Initial Vitals [04/12/25 1314]   BP Pulse Resp Temp SpO2   122/72 69 20 97.4 °F (36.3 °C) 96 %      MAP       --          Nursing notes and vital signs reviewed.  Constitutional: Patient is in no acute distress.   Head: Normocephalic. Atraumatic.   Eyes:  Conjunctivae are not pale. No scleral icterus.   ENT: Mucous membranes dry.   Neck: Supple.   Cardiovascular: Regular rate. Regular rhythm. No murmurs, rubs, or gallops   Pulmonary: No respiratory distress. Clear to auscultation bilaterally   Abdominal: Soft, nondistended, nontender.    Musculoskeletal: Moves all extremities. No obvious deformities.   Skin: Warm and dry.   Neurological:  Alert, awake, and appropriate. Normal speech. No acute lateralizing neurologic deficits appreciated.   Psychiatric: Normal affect.       ED Course   Procedures  Vitals:    04/12/25 1348 04/12/25 1349 04/12/25 1401 04/12/25 1402   BP:    119/74   Pulse: 61 61 63 64   Resp: 15 15 19     Temp:       TempSrc:       SpO2: 97% 96% 95% 95%   Weight:       Height:        04/12/25 1403 04/12/25 1414 04/12/25 1417 04/12/25 1439   BP:   114/68 109/61   Pulse: 61 60 62 (!) 57   Resp: 18 18  19   Temp:       TempSrc:       SpO2: 97% 97% 95% 96%   Weight:       Height:        04/12/25 1638 04/12/25 1640 04/12/25 1642 04/12/25 1731   BP: 118/61 118/72 (!) 116/56 116/62   Pulse: (!) 54 60 (!) 56 60   Resp: 15 (!) 26 11    Temp:       TempSrc:       SpO2: 97% 95% 97% 97%   Weight:       Height:        04/12/25 1732 04/12/25 1734 04/12/25 1736   BP:   116/62   Pulse: (!) 56 (!) 58 (!) 58   Resp: 17 20 20   Temp:   98 °F (36.7 °C)   TempSrc:   Oral   SpO2: 96% 97% 97%   Weight:      Height:        Lab Results Interpreted as Abnormal:  Labs Reviewed   COMPREHENSIVE METABOLIC PANEL - Abnormal       Result Value    Sodium 136      Potassium 4.3      Chloride 104      CO2 23      Glucose 85      BUN 21 (*)     Creatinine 0.9      Calcium 9.3      Protein Total 7.1      Albumin 3.9      Bilirubin Total 0.6      ALP 59      AST 17      ALT 18      Anion Gap 9      eGFR >60     CBC WITH DIFFERENTIAL - Abnormal    WBC 7.34      RBC 4.29      HGB 13.4      HCT 38.9      MCV 91      MCH 31.2 (*)     MCHC 34.4      RDW 12.4      Platelet Count 355      MPV 10.4      Nucleated RBC 0      Neut % 62.3      Lymph % 24.5      Mono % 9.4      Eos % 2.7      Basophil % 1.0      Imm Grans % 0.1      Neut # 4.57      Lymph # 1.80      Mono # 0.69      Eos # 0.20      Baso # 0.07      Imm Grans # 0.01     MAGNESIUM - Normal    Magnesium  1.8     PHOSPHORUS - Normal    Phosphorus Level 2.7     URINALYSIS, REFLEX TO URINE CULTURE - Normal    Color, UA Yellow      Appearance, UA Clear      pH, UA 7.0      Spec Grav UA 1.015      Protein, UA Negative      Glucose, UA Negative      Ketones, UA Negative      Bilirubin, UA Negative      Blood, UA Negative      Nitrites, UA Negative      Urobilinogen, UA Negative      Leukocyte Esterase, UA  Negative     PREGNANCY TEST, URINE RAPID - Normal    hCG Qualitative, Urine Negative     CBC W/ AUTO DIFFERENTIAL    Narrative:     The following orders were created for panel order CBC auto differential.  Procedure                               Abnormality         Status                     ---------                               -----------         ------                     CBC with Differential[[id number]]       Abnormal            Final result                 Please view results for these tests on the individual orders.   GREY TOP URINE HOLD   POCT GLUCOSE    POCT Glucose 81        All Lab Results:  Results for orders placed or performed during the hospital encounter of 04/12/25   POCT glucose    Collection Time: 04/12/25  1:39 PM   Result Value Ref Range    POCT Glucose 81 70 - 110 mg/dL   Comprehensive metabolic panel    Collection Time: 04/12/25  1:42 PM   Result Value Ref Range    Sodium 136 136 - 145 mmol/L    Potassium 4.3 3.5 - 5.1 mmol/L    Chloride 104 95 - 110 mmol/L    CO2 23 23 - 29 mmol/L    Glucose 85 70 - 110 mg/dL    BUN 21 (H) 6 - 20 mg/dL    Creatinine 0.9 0.5 - 1.4 mg/dL    Calcium 9.3 8.7 - 10.5 mg/dL    Protein Total 7.1 6.0 - 8.4 gm/dL    Albumin 3.9 3.5 - 5.2 g/dL    Bilirubin Total 0.6 0.1 - 1.0 mg/dL    ALP 59 40 - 150 unit/L    AST 17 11 - 45 unit/L    ALT 18 10 - 44 unit/L    Anion Gap 9 8 - 16 mmol/L    eGFR >60 >60 mL/min/1.73/m2   Magnesium    Collection Time: 04/12/25  1:42 PM   Result Value Ref Range    Magnesium  1.8 1.6 - 2.6 mg/dL   Phosphorus    Collection Time: 04/12/25  1:42 PM   Result Value Ref Range    Phosphorus Level 2.7 2.7 - 4.5 mg/dL   CBC with Differential    Collection Time: 04/12/25  1:42 PM   Result Value Ref Range    WBC 7.34 3.90 - 12.70 K/uL    RBC 4.29 4.00 - 5.40 M/uL    HGB 13.4 12.0 - 16.0 gm/dL    HCT 38.9 37.0 - 48.5 %    MCV 91 82 - 98 fL    MCH 31.2 (H) 27.0 - 31.0 pg    MCHC 34.4 32.0 - 36.0 g/dL    RDW 12.4 11.5 - 14.5 %    Platelet Count 355 150 -  450 K/uL    MPV 10.4 9.2 - 12.9 fL    Nucleated RBC 0 <=0 /100 WBC    Neut % 62.3 38 - 73 %    Lymph % 24.5 18 - 48 %    Mono % 9.4 4 - 15 %    Eos % 2.7 <=8 %    Basophil % 1.0 <=1.9 %    Imm Grans % 0.1 0.0 - 0.5 %    Neut # 4.57 1.8 - 7.7 K/uL    Lymph # 1.80 1 - 4.8 K/uL    Mono # 0.69 0.3 - 1 K/uL    Eos # 0.20 <=0.5 K/uL    Baso # 0.07 <=0.2 K/uL    Imm Grans # 0.01 0.00 - 0.04 K/uL   Urinalysis, Reflex to Urine Culture    Collection Time: 04/12/25  2:31 PM    Specimen: Urine   Result Value Ref Range    Color, UA Yellow Straw, Claudia, Yellow, Light-Orange    Appearance, UA Clear Clear    pH, UA 7.0 5.0 - 8.0    Spec Grav UA 1.015 1.005 - 1.030    Protein, UA Negative Negative    Glucose, UA Negative Negative    Ketones, UA Negative Negative    Bilirubin, UA Negative Negative    Blood, UA Negative Negative    Nitrites, UA Negative Negative    Urobilinogen, UA Negative <2.0 EU/dL    Leukocyte Esterase, UA Negative Negative   Pregnancy, urine rapid    Collection Time: 04/12/25  2:31 PM   Result Value Ref Range    hCG Qualitative, Urine Negative Negative     Imaging Results    None          The emergency physician reviewed the vital signs / test results outlined above.     ED Discussion     ED Course as of 04/13/25 0712   Sat Apr 12, 2025   1331 The EKG taken at 13:23 was reviewed and independently interpreted by the ED physician:  Physician interpreting: Anshu Dickey  Rhythm: sinus bradycardia   Rate: 59 bpm  No ST-T changes concerning for acute ischemia  Normal intervals   Normal axis   [ND]      ED Course User Index  [ND] Anshu Dickey MD     Stable after observation. Patient to discontinue all antihypertensives. Return precautions given.       ED Medication(s) Administered:  Medications   sodium chloride 0.9% bolus 1,000 mL 1,000 mL (0 mLs Intravenous Stopped 4/12/25 1444)       Prescription Management: I performed a review of the patient's current Rx medication list as input by nursing  staff.    Discharge Medication List as of 4/12/2025  5:01 PM        CONTINUE these medications which have NOT CHANGED    Details   montelukast (SINGULAIR) 10 mg tablet TAKE 1 TABLET BY MOUTH ONCE DAILY IN THE EVENING FOR ALLERGIES, Historical Med      !! atorvastatin (LIPITOR) 10 MG tablet Take 10 mg by mouth once daily., Historical Med      !! atorvastatin (LIPITOR) 10 MG tablet Take 1 tablet by mouth every evening., Starting Fri 1/20/2023, Historical Med      citalopram (CELEXA) 20 MG tablet Take 20 mg by mouth., Starting Tue 4/25/2023, Historical Med      EPINEPHrine (EPIPEN 2-MAYELIN) 0.3 mg/0.3 mL AtIn Inject 0.6 mLs (0.6 mg total) into the muscle once. for 1 dose, Starting Thu 7/11/2024, Print      hydrOXYzine HCL (ATARAX) 25 MG tablet Take 1 tablet (25 mg total) by mouth 3 (three) times daily as needed for Itching or Anxiety., Starting Thu 6/8/2023, Normal      ibuprofen (ADVIL,MOTRIN) 600 MG tablet Take 1 tablet (600 mg total) by mouth every 6 (six) hours as needed for Pain., Starting Wed 8/9/2023, Print      ondansetron (ZOFRAN) 4 MG tablet Take 1 tablet (4 mg total) by mouth every 6 (six) hours., Starting Sun 2/11/2024, Normal       !! - Potential duplicate medications found. Please discuss with provider.        STOP taking these medications       lisinopriL-hydrochlorothiazide (PRINZIDE,ZESTORETIC) 20-25 mg Tab Comments:   Reason for Stopping:         propranoloL (INDERAL LA) 60 MG 24 hr capsule Comments:   Reason for Stopping:         chlorthalidone (HYGROTEN) 25 MG Tab Comments:   Reason for Stopping:         HYDROcodone-acetaminophen (NORCO)  mg per tablet Comments:   Reason for Stopping:         lisinopriL (PRINIVIL,ZESTRIL) 20 MG tablet Comments:   Reason for Stopping:                 Follow-up Information       your primary care physician. Schedule an appointment as soon as possible for a visit in 2 days.               [facility], [place], operated by [facility].    Specialty: Emergency Medicine  Why: As needed, If  symptoms worsen  Contact information:  [address] [facility] [address] [phone]                          Clinical Impression       ICD-10-CM ICD-9-CM   1. Medication adverse effect, initial encounter  T50.905A E947.9   2. Syncope  R55 780.2      ED Disposition Condition    Discharge Stable             Anshu Dickey MD  04/13/25 0712

## 2025-04-12 NOTE — Clinical Note
"Kaylene Ivy"Jose was seen and treated in our emergency department on 4/12/2025.  She may return to work on 04/15/2025.       If you have any questions or concerns, please don't hesitate to call.      Anshu Dickey MD"

## 2025-04-12 NOTE — ED NOTES
Patient reports intermittent syncope for the last two months.  Two months ago BP medication cut in half by MD due to symptoms.  Since patient has experienced intermittent dizziness that resolves while sitting at rest.  Today while in shower patient had little time to respond to the dizziness and ended up syncopal.  Patient denies neck or back pain after falling from standing position.  Patient reports posterior head pain rated 2/10.     Pain:  Rated 2/10.     Psychosocial:  Patient is calm and cooperative.  Patients insight and judgement are appropriate to situation.  Appears clean, well maintained, with clothing appropriate to environment.  No evidence of delusions, hallucinations, or psychosis.     Neuro:  Eyes open spontaneously.  Awake, alert, oriented x 4.  Speech clear and appropriate.  Tolerating saliva secretions well.  Able to follow commands, demonstrating ability to actively and appropriately communicate within context of current conversation.  Symmetrical facial muscles.  Moving all extremities well with no noted weakness.  Adequate muscle tone present.  Movement is purposeful.         Airway:  Bilateral chest rise and fall.  RR regular and non-labored.         Circulatory:  Skin warm, dry, and pink.  Apical and radial pulses strong and regular.  Capillary refill/skin blanching less than 3 seconds to distal of 4 extremities.  SR on the CM without ectopy.     Urinary:  Patient denies pain, frequency, or urgency.  Voids independently.       Extremities:  No redness, heat, swelling, deformity, or pain.     Skin:  Intact with no bruising/discolorations noted.

## 2025-04-12 NOTE — RESPIRATORY THERAPY
04/12/25 1344   Patient Assessment/Suction   Level of Consciousness (AVPU) alert   Respiratory Effort Normal;Unlabored   Expansion/Accessory Muscles/Retractions no retractions;no use of accessory muscles   Rhythm/Pattern, Respiratory depth regular;pattern regular;unlabored   Cough Frequency no cough   PRE-TX-O2   Device (Oxygen Therapy) room air   SpO2 96 %   Pulse Oximetry Type Continuous   Pulse 60   Resp 13

## 2025-04-14 LAB
OHS QRS DURATION: 72 MS
OHS QTC CALCULATION: 396 MS

## 2025-05-18 ENCOUNTER — HOSPITAL ENCOUNTER (INPATIENT)
Facility: HOSPITAL | Age: 45
LOS: 1 days | Discharge: ANOTHER HOSPITAL | DRG: 897 | End: 2025-05-19
Attending: EMERGENCY MEDICINE | Admitting: STUDENT IN AN ORGANIZED HEALTH CARE EDUCATION/TRAINING PROGRAM
Payer: COMMERCIAL

## 2025-05-18 DIAGNOSIS — R07.9 CHEST PAIN: ICD-10-CM

## 2025-05-18 DIAGNOSIS — F10.939 ALCOHOL WITHDRAWAL SYNDROME WITH COMPLICATION: Primary | ICD-10-CM

## 2025-05-18 PROBLEM — F17.200 TOBACCO DEPENDENCY: Status: ACTIVE | Noted: 2025-05-18

## 2025-05-18 LAB
ABSOLUTE EOSINOPHIL (OHS): 0.28 K/UL
ABSOLUTE MONOCYTE (OHS): 1.12 K/UL (ref 0.3–1)
ABSOLUTE NEUTROPHIL COUNT (OHS): 10.54 K/UL (ref 1.8–7.7)
ACETONE SERPL QL: NEGATIVE
ALBUMIN SERPL BCP-MCNC: 3.9 G/DL (ref 3.5–5.2)
ALP SERPL-CCNC: 72 UNIT/L (ref 40–150)
ALT SERPL W/O P-5'-P-CCNC: 19 UNIT/L (ref 10–44)
AMPHET UR QL SCN: NEGATIVE
ANION GAP (OHS): 13 MMOL/L (ref 8–16)
AST SERPL-CCNC: 22 UNIT/L (ref 11–45)
B-HCG UR QL: NEGATIVE
BACTERIA #/AREA URNS HPF: ABNORMAL /HPF
BARBITURATE SCN PRESENT UR: NEGATIVE
BASOPHILS # BLD AUTO: 0.11 K/UL
BASOPHILS NFR BLD AUTO: 0.7 %
BENZODIAZ UR QL SCN: NEGATIVE
BILIRUB SERPL-MCNC: 0.1 MG/DL (ref 0.1–1)
BILIRUB UR QL STRIP.AUTO: NEGATIVE
BUN SERPL-MCNC: 12 MG/DL (ref 6–20)
CALCIUM SERPL-MCNC: 9 MG/DL (ref 8.7–10.5)
CANNABINOIDS UR QL SCN: ABNORMAL
CHLORIDE SERPL-SCNC: 108 MMOL/L (ref 95–110)
CLARITY UR: CLEAR
CO2 SERPL-SCNC: 18 MMOL/L (ref 23–29)
COCAINE UR QL SCN: NEGATIVE
COLOR UR AUTO: YELLOW
CREAT SERPL-MCNC: 0.8 MG/DL (ref 0.5–1.4)
CREAT UR-MCNC: 24.2 MG/DL (ref 15–325)
ERYTHROCYTE [DISTWIDTH] IN BLOOD BY AUTOMATED COUNT: 13.6 % (ref 11.5–14.5)
ETHANOL SERPL-MCNC: 211 MG/DL
GFR SERPLBLD CREATININE-BSD FMLA CKD-EPI: >60 ML/MIN/1.73/M2
GLUCOSE SERPL-MCNC: 91 MG/DL (ref 70–110)
GLUCOSE UR QL STRIP: NEGATIVE
HCT VFR BLD AUTO: 37.9 % (ref 37–48.5)
HGB BLD-MCNC: 12.8 GM/DL (ref 12–16)
HGB UR QL STRIP: ABNORMAL
HOLD SPECIMEN: NORMAL
IMM GRANULOCYTES # BLD AUTO: 0.06 K/UL (ref 0–0.04)
IMM GRANULOCYTES NFR BLD AUTO: 0.4 % (ref 0–0.5)
KETONES UR QL STRIP: NEGATIVE
LACTATE SERPL-SCNC: 1.7 MMOL/L (ref 0.5–2.2)
LACTATE SERPL-SCNC: 1.9 MMOL/L (ref 0.5–2.2)
LEUKOCYTE ESTERASE UR QL STRIP: ABNORMAL
LYMPHOCYTES # BLD AUTO: 2.99 K/UL (ref 1–4.8)
MCH RBC QN AUTO: 31.7 PG (ref 27–31)
MCHC RBC AUTO-ENTMCNC: 33.8 G/DL (ref 32–36)
MCV RBC AUTO: 94 FL (ref 82–98)
METHADONE UR QL SCN: NEGATIVE
MICROSCOPIC COMMENT: ABNORMAL
NITRITE UR QL STRIP: NEGATIVE
NUCLEATED RBC (/100WBC) (OHS): 0 /100 WBC
OPIATES UR QL SCN: NEGATIVE
PCP UR QL: NEGATIVE
PH UR STRIP: 6 [PH]
PLATELET # BLD AUTO: 402 K/UL (ref 150–450)
PMV BLD AUTO: 10.1 FL (ref 9.2–12.9)
POTASSIUM SERPL-SCNC: 3.8 MMOL/L (ref 3.5–5.1)
PROT SERPL-MCNC: 7.3 GM/DL (ref 6–8.4)
PROT UR QL STRIP: NEGATIVE
RBC # BLD AUTO: 4.04 M/UL (ref 4–5.4)
RBC #/AREA URNS HPF: 2 /HPF (ref 0–4)
RELATIVE EOSINOPHIL (OHS): 1.9 %
RELATIVE LYMPHOCYTE (OHS): 19.8 % (ref 18–48)
RELATIVE MONOCYTE (OHS): 7.4 % (ref 4–15)
RELATIVE NEUTROPHIL (OHS): 69.8 % (ref 38–73)
SODIUM SERPL-SCNC: 139 MMOL/L (ref 136–145)
SP GR UR STRIP: <=1.005
SQUAMOUS #/AREA URNS HPF: 4 /HPF
UROBILINOGEN UR STRIP-ACNC: NEGATIVE EU/DL
WBC # BLD AUTO: 15.1 K/UL (ref 3.9–12.7)
WBC #/AREA URNS HPF: 30 /HPF (ref 0–5)

## 2025-05-18 PROCEDURE — 81025 URINE PREGNANCY TEST: CPT | Performed by: EMERGENCY MEDICINE

## 2025-05-18 PROCEDURE — 87070 CULTURE OTHR SPECIMN AEROBIC: CPT | Performed by: STUDENT IN AN ORGANIZED HEALTH CARE EDUCATION/TRAINING PROGRAM

## 2025-05-18 PROCEDURE — 63600175 PHARM REV CODE 636 W HCPCS: Performed by: EMERGENCY MEDICINE

## 2025-05-18 PROCEDURE — 96365 THER/PROPH/DIAG IV INF INIT: CPT

## 2025-05-18 PROCEDURE — 81003 URINALYSIS AUTO W/O SCOPE: CPT | Performed by: EMERGENCY MEDICINE

## 2025-05-18 PROCEDURE — 99222 1ST HOSP IP/OBS MODERATE 55: CPT | Mod: ,,, | Performed by: STUDENT IN AN ORGANIZED HEALTH CARE EDUCATION/TRAINING PROGRAM

## 2025-05-18 PROCEDURE — 82009 KETONE BODYS QUAL: CPT | Performed by: EMERGENCY MEDICINE

## 2025-05-18 PROCEDURE — 83605 ASSAY OF LACTIC ACID: CPT | Performed by: EMERGENCY MEDICINE

## 2025-05-18 PROCEDURE — 63600175 PHARM REV CODE 636 W HCPCS: Performed by: STUDENT IN AN ORGANIZED HEALTH CARE EDUCATION/TRAINING PROGRAM

## 2025-05-18 PROCEDURE — 87040 BLOOD CULTURE FOR BACTERIA: CPT | Performed by: EMERGENCY MEDICINE

## 2025-05-18 PROCEDURE — 80053 COMPREHEN METABOLIC PANEL: CPT | Performed by: EMERGENCY MEDICINE

## 2025-05-18 PROCEDURE — 21400001 HC TELEMETRY ROOM

## 2025-05-18 PROCEDURE — 25000003 PHARM REV CODE 250: Performed by: STUDENT IN AN ORGANIZED HEALTH CARE EDUCATION/TRAINING PROGRAM

## 2025-05-18 PROCEDURE — 99285 EMERGENCY DEPT VISIT HI MDM: CPT | Mod: 25

## 2025-05-18 PROCEDURE — 87088 URINE BACTERIA CULTURE: CPT | Performed by: EMERGENCY MEDICINE

## 2025-05-18 PROCEDURE — 96375 TX/PRO/DX INJ NEW DRUG ADDON: CPT

## 2025-05-18 PROCEDURE — 94761 N-INVAS EAR/PLS OXIMETRY MLT: CPT

## 2025-05-18 PROCEDURE — 82077 ASSAY SPEC XCP UR&BREATH IA: CPT | Performed by: EMERGENCY MEDICINE

## 2025-05-18 PROCEDURE — 36415 COLL VENOUS BLD VENIPUNCTURE: CPT | Performed by: EMERGENCY MEDICINE

## 2025-05-18 PROCEDURE — 94760 N-INVAS EAR/PLS OXIMETRY 1: CPT

## 2025-05-18 PROCEDURE — 80307 DRUG TEST PRSMV CHEM ANLYZR: CPT | Performed by: EMERGENCY MEDICINE

## 2025-05-18 PROCEDURE — 72192 CT PELVIS W/O DYE: CPT | Mod: TC

## 2025-05-18 PROCEDURE — 85025 COMPLETE CBC W/AUTO DIFF WBC: CPT | Performed by: EMERGENCY MEDICINE

## 2025-05-18 RX ORDER — TALC
6 POWDER (GRAM) TOPICAL NIGHTLY PRN
Status: DISCONTINUED | OUTPATIENT
Start: 2025-05-18 | End: 2025-05-19 | Stop reason: HOSPADM

## 2025-05-18 RX ORDER — DIAZEPAM 5 MG/1
5 TABLET ORAL EVERY 8 HOURS
Status: DISCONTINUED | OUTPATIENT
Start: 2025-05-18 | End: 2025-05-19 | Stop reason: HOSPADM

## 2025-05-18 RX ORDER — IBUPROFEN 200 MG
1 TABLET ORAL DAILY PRN
Status: DISCONTINUED | OUTPATIENT
Start: 2025-05-18 | End: 2025-05-19 | Stop reason: HOSPADM

## 2025-05-18 RX ORDER — ONDANSETRON HYDROCHLORIDE 2 MG/ML
8 INJECTION, SOLUTION INTRAVENOUS
Status: COMPLETED | OUTPATIENT
Start: 2025-05-18 | End: 2025-05-18

## 2025-05-18 RX ORDER — GLUCAGON 1 MG
1 KIT INJECTION
Status: DISCONTINUED | OUTPATIENT
Start: 2025-05-18 | End: 2025-05-19 | Stop reason: HOSPADM

## 2025-05-18 RX ORDER — NALOXONE HCL 0.4 MG/ML
0.02 VIAL (ML) INJECTION
Status: DISCONTINUED | OUTPATIENT
Start: 2025-05-18 | End: 2025-05-19 | Stop reason: HOSPADM

## 2025-05-18 RX ORDER — FOLIC ACID 1 MG/1
1 TABLET ORAL DAILY
Status: DISCONTINUED | OUTPATIENT
Start: 2025-05-18 | End: 2025-05-19 | Stop reason: HOSPADM

## 2025-05-18 RX ORDER — FAMOTIDINE 20 MG/1
20 TABLET, FILM COATED ORAL 2 TIMES DAILY
Status: DISCONTINUED | OUTPATIENT
Start: 2025-05-18 | End: 2025-05-19 | Stop reason: HOSPADM

## 2025-05-18 RX ORDER — ONDANSETRON HYDROCHLORIDE 2 MG/ML
4 INJECTION, SOLUTION INTRAVENOUS EVERY 8 HOURS PRN
Status: DISCONTINUED | OUTPATIENT
Start: 2025-05-18 | End: 2025-05-19 | Stop reason: HOSPADM

## 2025-05-18 RX ORDER — SODIUM CHLORIDE 0.9 % (FLUSH) 0.9 %
10 SYRINGE (ML) INJECTION EVERY 12 HOURS PRN
Status: DISCONTINUED | OUTPATIENT
Start: 2025-05-18 | End: 2025-05-19 | Stop reason: HOSPADM

## 2025-05-18 RX ORDER — SODIUM,POTASSIUM PHOSPHATES 280-250MG
2 POWDER IN PACKET (EA) ORAL
Status: DISCONTINUED | OUTPATIENT
Start: 2025-05-18 | End: 2025-05-19 | Stop reason: HOSPADM

## 2025-05-18 RX ORDER — POLYETHYLENE GLYCOL 3350 17 G/17G
17 POWDER, FOR SOLUTION ORAL DAILY
Status: DISCONTINUED | OUTPATIENT
Start: 2025-05-19 | End: 2025-05-19 | Stop reason: HOSPADM

## 2025-05-18 RX ORDER — IBUPROFEN 200 MG
16 TABLET ORAL
Status: DISCONTINUED | OUTPATIENT
Start: 2025-05-18 | End: 2025-05-19 | Stop reason: HOSPADM

## 2025-05-18 RX ORDER — PROCHLORPERAZINE EDISYLATE 5 MG/ML
5 INJECTION INTRAMUSCULAR; INTRAVENOUS EVERY 6 HOURS PRN
Status: DISCONTINUED | OUTPATIENT
Start: 2025-05-18 | End: 2025-05-19 | Stop reason: HOSPADM

## 2025-05-18 RX ORDER — SODIUM CHLORIDE, SODIUM LACTATE, POTASSIUM CHLORIDE, CALCIUM CHLORIDE 600; 310; 30; 20 MG/100ML; MG/100ML; MG/100ML; MG/100ML
INJECTION, SOLUTION INTRAVENOUS CONTINUOUS
Status: DISCONTINUED | OUTPATIENT
Start: 2025-05-18 | End: 2025-05-19 | Stop reason: HOSPADM

## 2025-05-18 RX ORDER — CITALOPRAM 10 MG/1
20 TABLET ORAL DAILY
Status: DISCONTINUED | OUTPATIENT
Start: 2025-05-19 | End: 2025-05-18

## 2025-05-18 RX ORDER — CIPROFLOXACIN 2 MG/ML
400 INJECTION, SOLUTION INTRAVENOUS
Status: DISCONTINUED | OUTPATIENT
Start: 2025-05-19 | End: 2025-05-19 | Stop reason: HOSPADM

## 2025-05-18 RX ORDER — DEXTROSE, SODIUM CHLORIDE, SODIUM LACTATE, POTASSIUM CHLORIDE, AND CALCIUM CHLORIDE 5; .6; .31; .03; .02 G/100ML; G/100ML; G/100ML; G/100ML; G/100ML
INJECTION, SOLUTION INTRAVENOUS
Status: COMPLETED | OUTPATIENT
Start: 2025-05-18 | End: 2025-05-18

## 2025-05-18 RX ORDER — THIAMINE HYDROCHLORIDE 100 MG/ML
200 INJECTION, SOLUTION INTRAMUSCULAR; INTRAVENOUS DAILY
Status: DISCONTINUED | OUTPATIENT
Start: 2025-05-19 | End: 2025-05-19 | Stop reason: HOSPADM

## 2025-05-18 RX ORDER — IBUPROFEN 200 MG
24 TABLET ORAL
Status: DISCONTINUED | OUTPATIENT
Start: 2025-05-18 | End: 2025-05-19 | Stop reason: HOSPADM

## 2025-05-18 RX ORDER — CIPROFLOXACIN 2 MG/ML
400 INJECTION, SOLUTION INTRAVENOUS
Status: COMPLETED | OUTPATIENT
Start: 2025-05-18 | End: 2025-05-18

## 2025-05-18 RX ORDER — THIAMINE HYDROCHLORIDE 100 MG/ML
100 INJECTION, SOLUTION INTRAMUSCULAR; INTRAVENOUS
Status: COMPLETED | OUTPATIENT
Start: 2025-05-18 | End: 2025-05-18

## 2025-05-18 RX ORDER — LANOLIN ALCOHOL/MO/W.PET/CERES
800 CREAM (GRAM) TOPICAL
Status: DISCONTINUED | OUTPATIENT
Start: 2025-05-18 | End: 2025-05-19 | Stop reason: HOSPADM

## 2025-05-18 RX ORDER — ALUMINUM HYDROXIDE, MAGNESIUM HYDROXIDE, AND SIMETHICONE 1200; 120; 1200 MG/30ML; MG/30ML; MG/30ML
30 SUSPENSION ORAL 4 TIMES DAILY PRN
Status: DISCONTINUED | OUTPATIENT
Start: 2025-05-18 | End: 2025-05-19 | Stop reason: HOSPADM

## 2025-05-18 RX ADMIN — SODIUM CHLORIDE, POTASSIUM CHLORIDE, SODIUM LACTATE AND CALCIUM CHLORIDE: 600; 310; 30; 20 INJECTION, SOLUTION INTRAVENOUS at 03:05

## 2025-05-18 RX ADMIN — THIAMINE HYDROCHLORIDE 100 MG: 100 INJECTION, SOLUTION INTRAMUSCULAR; INTRAVENOUS at 11:05

## 2025-05-18 RX ADMIN — DIAZEPAM 5 MG: 5 TABLET ORAL at 03:05

## 2025-05-18 RX ADMIN — FAMOTIDINE 20 MG: 20 TABLET, FILM COATED ORAL at 09:05

## 2025-05-18 RX ADMIN — LORAZEPAM 1 MG: 2 INJECTION INTRAMUSCULAR; INTRAVENOUS at 11:05

## 2025-05-18 RX ADMIN — FOLIC ACID 1 MG: 1 TABLET ORAL at 03:05

## 2025-05-18 RX ADMIN — VANCOMYCIN HYDROCHLORIDE 1250 MG: 1.25 INJECTION, POWDER, LYOPHILIZED, FOR SOLUTION INTRAVENOUS at 06:05

## 2025-05-18 RX ADMIN — SODIUM CHLORIDE, SODIUM LACTATE, POTASSIUM CHLORIDE, CALCIUM CHLORIDE AND DEXTROSE MONOHYDRATE: 5; 600; 310; 30; 20 INJECTION, SOLUTION INTRAVENOUS at 11:05

## 2025-05-18 RX ADMIN — Medication 6 MG: at 11:05

## 2025-05-18 RX ADMIN — CIPROFLOXACIN 400 MG: 2 INJECTION, SOLUTION INTRAVENOUS at 12:05

## 2025-05-18 RX ADMIN — FAMOTIDINE 20 MG: 20 TABLET, FILM COATED ORAL at 03:05

## 2025-05-18 RX ADMIN — CIPROFLOXACIN 400 MG: 2 INJECTION, SOLUTION INTRAVENOUS at 11:05

## 2025-05-18 RX ADMIN — ONDANSETRON 8 MG: 2 INJECTION INTRAMUSCULAR; INTRAVENOUS at 11:05

## 2025-05-18 RX ADMIN — DIAZEPAM 5 MG: 5 TABLET ORAL at 09:05

## 2025-05-18 NOTE — RESPIRATORY THERAPY
05/18/25 1145   Patient Assessment/Suction   Level of Consciousness (AVPU) alert   Respiratory Effort Normal;Unlabored   Expansion/Accessory Muscles/Retractions no retractions;no use of accessory muscles   Rhythm/Pattern, Respiratory depth regular;pattern regular;unlabored   Cough Frequency no cough   PRE-TX-O2   Device (Oxygen Therapy) room air   SpO2 96 %   Pulse Oximetry Type Continuous   $ Pulse Oximetry - Single Charge Pulse Oximetry - Single   Pulse 83   Resp 18

## 2025-05-18 NOTE — H&P
[facility] Medicine  History & Physical    Patient Name: Kaylene Garcia  MRN: [id number]  Patient Class: IP- Inpatient  Admission Date: 5/18/2025  Attending Physician: Rodrigo Haney MD   Primary Care Provider: Josefina Hartman (Inactive)         Patient information was obtained from patient, past medical records, and ER records.     Subjective:     Principal Problem:Alcohol withdrawal syndrome with complication    Chief Complaint:   Chief Complaint   Patient presents with    Alcohol Problem     Patient requesting assistance with alcohol detox.  States she typically drinks a box of wine daily.  States last drink was at midnight.  Feels diaphoretic, anxious, restless, shaking, weak with palpitations.   concerned she may have had a seizure last night.          HPI: 43 yo w/ hx of ETOH abuse, HTN, HLD, Breast cancer presenting with symptoms of ETOH withdrawal. Last drink this am. Drank very heavily this weekend. She has been drinking a box of wine daily for past few months since she relapsed. She had been sober for 4 months prior to this. She states she was diagnosed with breast cancer and this caused her to relapse. No hx of withdrawal requiring admission. She states she might have had withdrawal seizure last night but was still very intoxicated. No fever, chills, vomiting, or diarrhea. No abdominal pain. No dysuria. Endorses nausea.    Past Medical History:   Diagnosis Date    Alcohol abuse     Anxiety     Cancer     Hyperlipidemia     Hypertension     Migraine headache        Past Surgical History:   Procedure Laterality Date    ANOSCOPY N/A 02/12/2020    Procedure: ANOSCOPY;  Surgeon: Musa Cannon MD;  Location: [facility] OR;  Service: General;  Laterality: N/A;    BREAST BIOPSY Right 2022    INCISION AND DRAINAGE OF BUTTOCK N/A 07/14/2021    Procedure: INCISION AND DRAINAGE, BUTTOCK;  Surgeon: Jayjay Swain MD;  Location: [facility] OR;  Service: General;  Laterality: N/A;   JESSEE-RECTAL    INCISION OF PERIRECTAL ABSCESS Left 02/12/2020    Procedure: INCISION, ABSCESS, PERIRECTAL;  Surgeon: Musa Cannon MD;  Location: [facility] OR;  Service: General;  Laterality: Left;       Review of patient's allergies indicates:   Allergen Reactions    Pcn [penicillins] Anaphylaxis    Shellfish containing products Anaphylaxis    Iodine Rash    Iodine and iodide containing products Itching and Rash       No current facility-administered medications on file prior to encounter.     Current Outpatient Medications on File Prior to Encounter   Medication Sig    atorvastatin (LIPITOR) 10 MG tablet Take 10 mg by mouth once daily.    atorvastatin (LIPITOR) 10 MG tablet Take 1 tablet by mouth every evening.    citalopram (CELEXA) 20 MG tablet Take 20 mg by mouth.    EPINEPHrine (EPIPEN 2-MAYELIN) 0.3 mg/0.3 mL AtIn Inject 0.6 mLs (0.6 mg total) into the muscle once. for 1 dose    hydrOXYzine HCL (ATARAX) 25 MG tablet Take 1 tablet (25 mg total) by mouth 3 (three) times daily as needed for Itching or Anxiety.    ibuprofen (ADVIL,MOTRIN) 600 MG tablet Take 1 tablet (600 mg total) by mouth every 6 (six) hours as needed for Pain.    montelukast (SINGULAIR) 10 mg tablet TAKE 1 TABLET BY MOUTH ONCE DAILY IN THE EVENING FOR ALLERGIES    ondansetron (ZOFRAN) 4 MG tablet Take 1 tablet (4 mg total) by mouth every 6 (six) hours.     Family History       Problem Relation (Age of Onset)    Breast cancer Mother, Paternal Grandmother          Tobacco Use    Smoking status: Every Day     Current packs/day: 1.00     Average packs/day: 1 pack/day for 20.0 years (20.0 ttl pk-yrs)     Types: Cigarettes    Smokeless tobacco: Never   Substance and Sexual Activity    Alcohol use: Yes     Alcohol/week: 50.0 standard drinks of alcohol     Types: 50 Glasses of wine per week    Drug use: Yes     Frequency: 3.0 times per week     Types: Marijuana    Sexual activity: Yes     Partners: Male     Birth control/protection: None     Review of  Systems   All other systems reviewed and are negative.    Objective:     Vital Signs (Most Recent):  Temp: 98 °F (36.7 °C) (05/18/25 1046)  Pulse: 83 (05/18/25 1145)  Resp: 18 (05/18/25 1145)  BP: 130/66 (05/18/25 1102)  SpO2: 96 % (05/18/25 1145) Vital Signs (24h Range):  Temp:  [98 °F (36.7 °C)] 98 °F (36.7 °C)  Pulse:  [] 83  Resp:  [16-18] 18  SpO2:  [96 %-97 %] 96 %  BP: (130-132)/(66-83) 130/66     Weight: 81.6 kg (180 lb)  Body mass index is 29.95 kg/m².     Physical Exam        Vitals reviewed  General: NAD, Well developed, Well Nourished  Head: NC/AT  Eyes: EOMI, LEIDY  Cardiovascular: Pulses intact distally, Regular Rate and rhythm  Pulmonary: Normal Respiratory Rate, No respiratory distress  Gi: Soft, Non-tender  Extremities: Warm, No edema present  Skin: Warm, dry  Neuro: Alert, Oriented x3, tremor present  Psych: Appropriate mood and affect       Significant Labs: All pertinent labs within the past 24 hours have been reviewed.    Significant Imaging: I have reviewed all pertinent imaging results/findings within the past 24 hours.  Assessment/Plan:     Assessment & Plan  Alcohol withdrawal syndrome with complication  Patient having mild withdrawal symptoms with positive ETOH  Possible withdrawal seizure last night but this is unlikely as she was still heavily intoxicated at that time  Start valium taper  PRN ativan with CIWA q4h  Thiamine and folic acid  Pepcid BID  IVF  Counseled on cessation    Anxiety  Continue home medication    Mixed hyperlipidemia  Hold statin for now    VTE Risk Mitigation (From admission, onward)           Ordered     IP VTE LOW RISK PATIENT  Once         05/18/25 1242     Place sequential compression device  Until discontinued         05/18/25 1242                                    Rodrigo Hnaey MD  Department of Hospital Medicine  [facility] Emergency Dept

## 2025-05-18 NOTE — NURSING
Pt states that she forgot to mention it upon admission to the ER but that she has a perirectal abscess that she has had drained before that is starting to come back. Picture taken and is in chart. I let MD Haney know of patient's concern.

## 2025-05-18 NOTE — PROGRESS NOTES
"Pharmacokinetic Initial Assessment: IV Vancomycin    Assessment/Plan:    Initiate intravenous vancomycin with 1250 mg every 12 hours.  Desired empiric serum trough concentration is 10 to 15 mcg/mL  Draw vancomycin trough level 60 min prior to fourth dose on 5/20 at approximately 0530.  Pharmacy will continue to follow and monitor vancomycin.      Please contact pharmacy at extension [phone] with any questions regarding this assessment.     Thank you for the consult,   Ren Alcantara, PharmD       Patient brief summary:  Kaylene Garcia is a 44 y.o. female initiated on antimicrobial therapy with IV Vancomycin for treatment of suspected skin & soft tissue infection    Drug Allergies:   Review of patient's allergies indicates:   Allergen Reactions    Pcn [penicillins] Anaphylaxis    Shellfish containing products Anaphylaxis    Iodine Rash    Iodine and iodide containing products Itching and Rash       Actual Body Weight:   81.6 kg    Renal Function:   Estimated Creatinine Clearance: 94.6 mL/min (based on SCr of 0.8 mg/dL).,     Dialysis Method (if applicable):  N/A    CBC (last 72 hours):  Recent Labs   Lab Result Units 05/18/25  1120   WBC K/uL 15.10*   HGB gm/dL 12.8   HCT % 37.9   Platelet Count K/uL 402   Lymph % % 19.8   Mono % % 7.4   Eos % % 1.9   Basophil % % 0.7       Metabolic Panel (last 72 hours):  Recent Labs   Lab Result Units 05/18/25  1120 05/18/25  1133   Sodium mmol/L 139  --    Potassium mmol/L 3.8  --    Chloride mmol/L 108  --    CO2 mmol/L 18*  --    Glucose mg/dL 91  --    Glucose, UA   --  Negative   BUN mg/dL 12  --    Creatinine mg/dL 0.8  --    Urine Creatinine mg/dL  --  24.2   Albumin g/dL 3.9  --    Bilirubin Total mg/dL 0.1  --    ALP unit/L 72  --    AST unit/L 22  --    ALT unit/L 19  --        Drug levels (last 3 results):  No results for input(s): "VANCOMYCINRA", "VANCORANDOM", "VANCOMYCINPE", "VANCOPEAK", "VANCOMYCINTR", "VANCOTROUGH" in the last 72 hours.    Microbiologic " Results:  Microbiology Results (last 7 days)       Procedure Component Value Units Date/Time    Blood culture x two cultures. Draw prior to antibiotics. [[id number]] Collected: 05/18/25 1223    Order Status: Sent Specimen: Blood from Peripheral, Antecubital, Right Updated: 05/18/25 1245    Blood culture x two cultures. Draw prior to antibiotics. [[id number]] Collected: 05/18/25 1212    Order Status: Sent Specimen: Blood from Peripheral, Wrist, Right Updated: 05/18/25 1219    Urine culture [[id number]] Collected: 05/18/25 1133    Order Status: Sent Specimen: Urine Updated: 05/18/25 1153

## 2025-05-18 NOTE — NURSING
End of shift note  Pt alert, oriented, and ambulatory   aoX4  Admitted to the floor today   PIVs intact  Pt was able to ambulate to the bathroom today with standby assist r/t still being a little shaky from tremors  Pt seems eager to start her sobriety journey  Pt called me in her room to inform me that she has a perirectal abscess that she has had surgically drained and packed before but it is now starting to give her issues again but forgot to mention it to the ER staff prior to admission to the floor. I let MD Haney know about this. Orders placed for a CT. Pictures in chart. Located on inner L buttocks. Tenderness/pain upon palpitation of the center of the abscess where it is draining a yellowish fluid in scant to small amounts, no pain located in the mariangel wound area, no odor appreciated,. Wound and surrounding areas are pink in color without erythema.    Pt states that the valium is helping, she has not required ativan on this shift  Vancomycin ordered to start today, trough due 5/20 @ 5:30.  IV cipro started in ER and finished infusing after pt was admitted to the CCU   Pt is on telemetry and continuous pulse oximetry   Pt verbalizes understanding of fall risks and that she needs to call before getting out of bed to go to the bathroom

## 2025-05-18 NOTE — ED NOTES
See triage and CIWA score.     Pain:  Rated 5/10 - headache.     Psychosocial:  Patient is calm and cooperative.  Patients insight and judgement are appropriate to situation.  Appears clean, well maintained, with clothing appropriate to environment.  No evidence of delusions, hallucinations, or psychosis.     Neuro:  Eyes open spontaneously.  Awake, alert, oriented x 4.  Speech clear and appropriate.  Tolerating saliva secretions well.  Able to follow commands, demonstrating ability to actively and appropriately communicate within context of current conversation.  Symmetrical facial muscles.  Moving all extremities well with no noted weakness.  Adequate muscle tone present.  Movement is purposeful.         Airway:  Bilateral chest rise and fall.  RR regular and non-labored.         Circulatory:  Skin warm, dry, and pink.  Apical and radial pulses strong and regular.  Capillary refill/skin blanching less than 3 seconds to distal of 4 extremities.  SR on the CM without noted ectopy.     Abdomen:  Abdomen soft and non-distended.        Extremities:  No redness, heat, swelling, deformity, or pain.     Skin:  Intact with no bruising/discolorations noted.

## 2025-05-18 NOTE — HPI
43 yo w/ hx of ETOH abuse, HTN, HLD, Breast cancer presenting with symptoms of ETOH withdrawal. Last drink this am. Drank very heavily this weekend. She has been drinking a box of wine daily for past few months since she relapsed. She had been sober for 4 months prior to this. She states she was diagnosed with breast cancer and this caused her to relapse. No hx of withdrawal requiring admission. She states she might have had withdrawal seizure last night but was still very intoxicated. No fever, chills, vomiting, or diarrhea. No abdominal pain. No dysuria. Endorses nausea.

## 2025-05-18 NOTE — ASSESSMENT & PLAN NOTE
Patient having mild withdrawal symptoms with positive ETOH  Possible withdrawal seizure last night but this is unlikely as she was still heavily intoxicated at that time  Start valium taper  PRN ativan with CIWA q4h  Thiamine and folic acid  Pepcid BID  IVF  Counseled on cessation

## 2025-05-18 NOTE — ED PROVIDER NOTES
"   History     Chief Complaint   Patient presents with    Alcohol Problem     Patient requesting assistance with alcohol detox.  States she typically drinks a box of wine daily.  States last drink was at midnight.  Feels diaphoretic, anxious, restless, shaking, weak with palpitations.   concerned she may have had a seizure last night.       HPI:  Kaylene Garcia is a 44 y.o. female with PMH as below who presents to the [facility] emergency department for evaluation of severe alcoholism, fell "off the wagon" a few weeks ago drinking boxes of wine, Titos, Fireball, including drinking all night last night and coming in this morning after another glass of wine. She reports having alcohol withdrawal seizures in the past. She feels anxious/restless and shaky. She is asking to come in for help as she's failed attempts at stopping EtOH at home. She was recently diagnosed with breast cancer s/p excision, and is motivated for sobriety to be able to undergo preventative mastectomy in July.       PCP: Josefina Hartman (Inactive)    Review of patient's allergies indicates:   Allergen Reactions    Pcn [penicillins] Anaphylaxis    Shellfish containing products Anaphylaxis    Iodine Rash    Iodine and iodide containing products Itching and Rash      Past Medical History:   Diagnosis Date    Alcohol abuse     Anxiety     Cancer     Hyperlipidemia     Hypertension     Migraine headache      Past Surgical History:   Procedure Laterality Date    ANOSCOPY N/A 02/12/2020    Procedure: ANOSCOPY;  Surgeon: Musa Cannon MD;  Location: [facility] OR;  Service: General;  Laterality: N/A;    BREAST BIOPSY Right 2022    INCISION AND DRAINAGE OF BUTTOCK N/A 07/14/2021    Procedure: INCISION AND DRAINAGE, BUTTOCK;  Surgeon: Jayjay Swain MD;  Location: [facility] OR;  Service: General;  Laterality: N/A;  JESSEE-RECTAL    INCISION OF PERIRECTAL ABSCESS Left 02/12/2020    Procedure: INCISION, ABSCESS, PERIRECTAL;  Surgeon: Musa DELEON" MD Davis;  Location: [facility] OR;  Service: General;  Laterality: Left;       Family History   Problem Relation Name Age of Onset    Breast cancer Mother      Breast cancer Paternal Grandmother       Social History     Tobacco Use    Smoking status: Every Day     Current packs/day: 1.00     Average packs/day: 1 pack/day for 20.0 years (20.0 ttl pk-yrs)     Types: Cigarettes    Smokeless tobacco: Never   Substance and Sexual Activity    Alcohol use: Yes     Alcohol/week: 50.0 standard drinks of alcohol     Types: 50 Glasses of wine per week    Drug use: Yes     Frequency: 3.0 times per week     Types: Marijuana    Sexual activity: Yes     Partners: Male     Birth control/protection: None      Review of Systems     Review of Systems   Constitutional: Negative.  Negative for fever.   HENT: Negative.     Eyes: Negative.    Respiratory: Negative.     Cardiovascular: Negative.    Gastrointestinal: Negative.    Endocrine: Negative.    Genitourinary: Negative.    Musculoskeletal: Negative.    Skin: Negative.    Allergic/Immunologic: Negative.    Neurological: Negative.    Hematological: Negative.    Psychiatric/Behavioral:  Positive for decreased concentration. Negative for suicidal ideas. The patient is nervous/anxious.    All other systems reviewed and are negative.       Physical Exam     Initial Vitals [05/18/25 1046]   BP Pulse Resp Temp SpO2   132/83 103 16 98 °F (36.7 °C) 97 %      MAP       --          Nursing notes and vital signs reviewed.  Constitutional: Patient is in moderate distress.   Head: Normocephalic. Atraumatic.   Eyes:  Conjunctivae are not pale. No scleral icterus.   ENT: Mucous membranes moist.   Neck: Supple.   Cardiovascular: tachycardic. Regular rhythm.   Pulmonary: No respiratory distress.   Abdominal: Non-distended.   Musculoskeletal: Moves all extremities. No obvious deformities.   Skin: Warm and dry.   Neurological:  Alert, awake, and appropriate. Normal speech. No acute lateralizing neurologic  "deficits appreciated.   Psychiatric: Labile. Anxious. No SI/HI/AVH/RIS.      ED Course   Critical Care    Date/Time: 5/18/2025 11:00 AM    Performed by: Anshu Dickey MD  Authorized by: Anshu Dickey MD  Direct patient critical care time: 15 minutes  Additional history critical care time: 5 minutes  Ordering / reviewing critical care time: 5 minutes  Documentation critical care time: 5 minutes  Consulting other physicians critical care time: 5 minutes  Total critical care time (exclusive of procedural time) : 35 minutes  Critical care time was exclusive of separately billable procedures and treating other patients and teaching time.  Critical care was necessary to treat or prevent imminent or life-threatening deterioration of the following conditions: acute alcohol withdrawal.  Critical care was time spent personally by me on the following activities: blood draw for specimens, development of treatment plan with patient or surrogate, interpretation of cardiac output measurements, evaluation of patient's response to treatment, examination of patient, obtaining history from patient or surrogate, ordering and performing treatments and interventions, ordering and review of laboratory studies, pulse oximetry, re-evaluation of patient's condition, review of old charts and discussions with consultants.        Vitals:    05/18/25 1046 05/18/25 1102 05/18/25 1145 05/18/25 1321   BP: 132/83 130/66  (!) 101/57   Pulse: 103 97 83 80   Resp: 16 17 18 17   Temp: 98 °F (36.7 °C)      TempSrc: Oral      SpO2: 97% 97% 96% 95%   Weight: 81.6 kg (180 lb)      Height: 5' 5" (1.651 m)       05/18/25 1345   BP: 123/67   Pulse: 74   Resp: 19   Temp: 98.2 °F (36.8 °C)   TempSrc:    SpO2: 97%   Weight:    Height:      Lab Results Interpreted as Abnormal:  Labs Reviewed   URINALYSIS, REFLEX TO URINE CULTURE - Abnormal       Result Value    Color, UA Yellow      Appearance, UA Clear      pH, UA 6.0      Spec Grav UA <=1.005 (*)  " "   Protein, UA Negative      Glucose, UA Negative      Ketones, UA Negative      Bilirubin, UA Negative      Blood, UA Trace (*)     Nitrites, UA Negative      Urobilinogen, UA Negative      Leukocyte Esterase, UA 1+ (*)    ALCOHOL,MEDICAL (ETHANOL) - Abnormal    Alcohol, Serum 211 (*)    CBC WITH DIFFERENTIAL - Abnormal    WBC 15.10 (*)     RBC 4.04      HGB 12.8      HCT 37.9      MCV 94      MCH 31.7 (*)     MCHC 33.8      RDW 13.6      Platelet Count 402      MPV 10.1      Nucleated RBC 0      Neut % 69.8      Lymph % 19.8      Mono % 7.4      Eos % 1.9      Basophil % 0.7      Imm Grans % 0.4      Neut # 10.54 (*)     Lymph # 2.99      Mono # 1.12 (*)     Eos # 0.28      Baso # 0.11      Imm Grans # 0.06 (*)    DRUG SCREEN PANEL, URINE EMERGENCY - Abnormal    Benzodiazepine, Urine Negative      Methadone, Urine Negative      Cocaine, Urine Negative      Opiates, Urine Negative      Barbiturates, Urine Negative      Amphetamines, Urine Negative      THC Presumptive Positive (*)     Phencyclidine, Urine Negative      Urine Creatinine 24.2      Narrative:     This screen includes the following classes of drugs at the listed cut-off:     Benzodiazepines:        200 ng/ml   Methadone:              300 ng/ml   Cocaine metabolite:     300 ng/ml   Opiates:                300 ng/ml   Barbiturates:           200 ng/ml   Amphetamines:           1000 ng/ml   Marijuana metabs (THC): 50 ng/ml   Phencyclidine (PCP):    25 ng/ml     This is a screening test. If results do not correlate with clinical presentation, then a confirmatory send out test is advised.    This report is intended for use in clinical monitoring and management of patients. It is not intended for use in employment related drug testing."   URINALYSIS MICROSCOPIC - Abnormal    RBC, UA 2      WBC, UA 30 (*)     Bacteria, UA Few (*)     Squamous Epithelial Cells, UA 4      Microscopic Comment       COMPREHENSIVE METABOLIC PANEL - Abnormal    Sodium 139      " Potassium 3.8      Chloride 108      CO2 18 (*)     Glucose 91      BUN 12      Creatinine 0.8      Calcium 9.0      Protein Total 7.3      Albumin 3.9      Bilirubin Total 0.1      ALP 72      AST 22      ALT 19      Anion Gap 13      eGFR >60     ACETONE - Normal    Acetone Lvl Negative     PREGNANCY TEST, URINE RAPID - Normal    hCG Qualitative, Urine Negative     LACTIC ACID, PLASMA - Normal    Lactic Acid Level 1.9      Narrative:     Falsely low lactic acid results can be found in samples containing >=13.0 mg/dL total bilirubin and/or >=3.5 mg/dL direct bilirubin.    CULTURE, URINE   CULTURE, BLOOD   CULTURE, BLOOD   CBC W/ AUTO DIFFERENTIAL    Narrative:     The following orders were created for panel order CBC auto differential.  Procedure                               Abnormality         Status                     ---------                               -----------         ------                     CBC with Differential[[id number]]       Abnormal            Final result                 Please view results for these tests on the individual orders.   GREY TOP URINE HOLD    Extra Tube Hold for add-ons.     URINALYSIS, REFLEX TO URINE CULTURE   LACTIC ACID, PLASMA      All Lab Results:  Results for orders placed or performed during the hospital encounter of 05/18/25   Acetone, serum    Collection Time: 05/18/25 11:20 AM   Result Value Ref Range    Acetone Lvl Negative Negative   Ethanol    Collection Time: 05/18/25 11:20 AM   Result Value Ref Range    Alcohol, Serum 211 (H) <10 mg/dL   CBC with Differential    Collection Time: 05/18/25 11:20 AM   Result Value Ref Range    WBC 15.10 (H) 3.90 - 12.70 K/uL    RBC 4.04 4.00 - 5.40 M/uL    HGB 12.8 12.0 - 16.0 gm/dL    HCT 37.9 37.0 - 48.5 %    MCV 94 82 - 98 fL    MCH 31.7 (H) 27.0 - 31.0 pg    MCHC 33.8 32.0 - 36.0 g/dL    RDW 13.6 11.5 - 14.5 %    Platelet Count 402 150 - 450 K/uL    MPV 10.1 9.2 - 12.9 fL    Nucleated RBC 0 <=0 /100 WBC    Neut % 69.8 38 - 73 %     Lymph % 19.8 18 - 48 %    Mono % 7.4 4 - 15 %    Eos % 1.9 <=8 %    Basophil % 0.7 <=1.9 %    Imm Grans % 0.4 0.0 - 0.5 %    Neut # 10.54 (H) 1.8 - 7.7 K/uL    Lymph # 2.99 1 - 4.8 K/uL    Mono # 1.12 (H) 0.3 - 1 K/uL    Eos # 0.28 <=0.5 K/uL    Baso # 0.11 <=0.2 K/uL    Imm Grans # 0.06 (H) 0.00 - 0.04 K/uL   Comprehensive metabolic panel    Collection Time: 05/18/25 11:20 AM   Result Value Ref Range    Sodium 139 136 - 145 mmol/L    Potassium 3.8 3.5 - 5.1 mmol/L    Chloride 108 95 - 110 mmol/L    CO2 18 (L) 23 - 29 mmol/L    Glucose 91 70 - 110 mg/dL    BUN 12 6 - 20 mg/dL    Creatinine 0.8 0.5 - 1.4 mg/dL    Calcium 9.0 8.7 - 10.5 mg/dL    Protein Total 7.3 6.0 - 8.4 gm/dL    Albumin 3.9 3.5 - 5.2 g/dL    Bilirubin Total 0.1 0.1 - 1.0 mg/dL    ALP 72 40 - 150 unit/L    AST 22 11 - 45 unit/L    ALT 19 10 - 44 unit/L    Anion Gap 13 8 - 16 mmol/L    eGFR >60 >60 mL/min/1.73/m2   Urinalysis, Reflex to Urine Culture Urine, Clean Catch    Collection Time: 05/18/25 11:33 AM    Specimen: Urine   Result Value Ref Range    Color, UA Yellow Straw, Claudia, Yellow, Light-Orange    Appearance, UA Clear Clear    pH, UA 6.0 5.0 - 8.0    Spec Grav UA <=1.005 (A) 1.005 - 1.030    Protein, UA Negative Negative    Glucose, UA Negative Negative    Ketones, UA Negative Negative    Bilirubin, UA Negative Negative    Blood, UA Trace (A) Negative    Nitrites, UA Negative Negative    Urobilinogen, UA Negative <2.0 EU/dL    Leukocyte Esterase, UA 1+ (A) Negative   Pregnancy, urine rapid    Collection Time: 05/18/25 11:33 AM   Result Value Ref Range    hCG Qualitative, Urine Negative Negative   Drug screen panel, emergency    Collection Time: 05/18/25 11:33 AM   Result Value Ref Range    Benzodiazepine, Urine Negative Negative    Methadone, Urine Negative Negative    Cocaine, Urine Negative Negative    Opiates, Urine Negative Negative    Barbiturates, Urine Negative Negative    Amphetamines, Urine Negative Negative    THC Presumptive  Positive (A) Negative    Phencyclidine, Urine Negative Negative    Urine Creatinine 24.2 15.0 - 325.0 mg/dL   GREY TOP URINE HOLD    Collection Time: 05/18/25 11:33 AM   Result Value Ref Range    Extra Tube Hold for add-ons.    Urinalysis Microscopic    Collection Time: 05/18/25 11:33 AM   Result Value Ref Range    RBC, UA 2 0 - 4 /HPF    WBC, UA 30 (H) 0 - 5 /HPF    Bacteria, UA Few (A) None, Rare, Occasional /HPF    Squamous Epithelial Cells, UA 4 /HPF    Microscopic Comment     Lactic acid, plasma #1    Collection Time: 05/18/25 12:12 PM   Result Value Ref Range    Lactic Acid Level 1.9 0.5 - 2.2 mmol/L     Imaging Results    None          The emergency physician reviewed the vital signs / test results outlined above.     ED Discussion     ED Course as of 05/18/25 1532   Sun May 18, 2025   1056 The EKG taken at 10:55 was reviewed and independently interpreted by the ED physician:  Physician interpreting: Anshu Dickey  Rhythm: normal sinus  Rate: 100 bpm  No ST-T changes concerning for acute ischemia  Normal intervals   [ND]   1156 WBC, UA(!): 30 [ND]   1156 WBC(!): 15.10 [ND]   1156 Pulse: 103 [ND]      ED Course User Index  [ND] Anshu Dickey MD     I discussed the patient's case with Dr. Rodrigo Haney, hospitalist, who accepts the patient for admission.     Admitting physician: Dr. Haney  Admitting service:  Hospital Medicine       ED Medication(s) Administered:  Medications   sodium chloride 0.9% flush 10 mL (has no administration in time range)   naloxone 0.4 mg/mL injection 0.02 mg (has no administration in time range)   glucose chewable tablet 16 g (has no administration in time range)   glucose chewable tablet 24 g (has no administration in time range)   dextrose 50% injection 12.5 g (has no administration in time range)   dextrose 50% injection 25 g (has no administration in time range)   glucagon (human recombinant) injection 1 mg (has no administration in time range)   potassium  bicarbonate disintegrating tablet 50 mEq (has no administration in time range)   potassium bicarbonate disintegrating tablet 35 mEq (has no administration in time range)   potassium bicarbonate disintegrating tablet 60 mEq (has no administration in time range)   magnesium oxide tablet 800 mg (has no administration in time range)   magnesium oxide tablet 800 mg (has no administration in time range)   potassium, sodium phosphates 280-160-250 mg packet 2 packet (has no administration in time range)   potassium, sodium phosphates 280-160-250 mg packet 2 packet (has no administration in time range)   potassium, sodium phosphates 280-160-250 mg packet 2 packet (has no administration in time range)   polyethylene glycol packet 17 g (has no administration in time range)   ondansetron injection 4 mg (has no administration in time range)   prochlorperazine injection Soln 5 mg (has no administration in time range)   melatonin tablet 6 mg (has no administration in time range)   aluminum-magnesium hydroxide-simethicone 200-200-20 mg/5 mL suspension 30 mL (has no administration in time range)   diazePAM tablet 5 mg (has no administration in time range)   ciprofloxacin (CIPRO)400mg/200ml D5W IVPB 400 mg (has no administration in time range)   lactated ringers infusion (has no administration in time range)   thiamine injection 200 mg (has no administration in time range)   folic acid tablet 1 mg (has no administration in time range)   famotidine tablet 20 mg (has no administration in time range)   LORazepam (ATIVAN) injection 2 mg (has no administration in time range)   nicotine 21 mg/24 hr 1 patch (has no administration in time range)   thiamine injection 100 mg (100 mg Intravenous Given 5/18/25 1120)   dextrose 5 % in lactated ringers infusion ( Intravenous New Bag 5/18/25 1126)   ondansetron injection 8 mg (8 mg Intravenous Given 5/18/25 1122)   LORazepam (ATIVAN) injection 1 mg (1 mg Intravenous Given 5/18/25 1159)    ciprofloxacin (CIPRO)400mg/200ml D5W IVPB 400 mg (0 mg Intravenous Stopped 5/18/25 1324)       Prescription Management: I performed a review of the patient's current Rx medication list as input by nursing staff.    Current Discharge Medication List        CONTINUE these medications which have NOT CHANGED    Details   !! atorvastatin (LIPITOR) 10 MG tablet Take 10 mg by mouth once daily.      !! atorvastatin (LIPITOR) 10 MG tablet Take 1 tablet by mouth every evening.      citalopram (CELEXA) 20 MG tablet Take 20 mg by mouth.      EPINEPHrine (EPIPEN 2-MAYELIN) 0.3 mg/0.3 mL AtIn Inject 0.6 mLs (0.6 mg total) into the muscle once. for 1 dose  Qty: 0.6 mL, Refills: 2    Associated Diagnoses: Allergic reaction to food, initial encounter; History of anaphylaxis      hydrOXYzine HCL (ATARAX) 25 MG tablet Take 1 tablet (25 mg total) by mouth 3 (three) times daily as needed for Itching or Anxiety.  Qty: 12 tablet, Refills: 0      ibuprofen (ADVIL,MOTRIN) 600 MG tablet Take 1 tablet (600 mg total) by mouth every 6 (six) hours as needed for Pain.  Qty: 30 tablet, Refills: 0    Associated Diagnoses: Cellulitis and abscess of buttock      montelukast (SINGULAIR) 10 mg tablet TAKE 1 TABLET BY MOUTH ONCE DAILY IN THE EVENING FOR ALLERGIES      ondansetron (ZOFRAN) 4 MG tablet Take 1 tablet (4 mg total) by mouth every 6 (six) hours.  Qty: 12 tablet, Refills: 0    Associated Diagnoses: Alcohol withdrawal syndrome without complication       !! - Potential duplicate medications found. Please discuss with provider.              Clinical Impression       ICD-10-CM ICD-9-CM   1. Alcohol withdrawal syndrome with complication  F10.939 291.81   2. Chest pain  R07.9 786.50      ED Disposition Condition    Admit              Anshu Dickey MD  05/18/25 5883

## 2025-05-18 NOTE — CARE UPDATE
Patient notified nursing of mariangel-rectal abscess returning this evening. On exam patient has scar tissue present from prior surgery with small opening with small amount of purulent drainage. Unclear how deep wound is on exam. Not terrible painful to palpation. Started vancomycin. CT obtained without contrast due to allergy. Read is pending. Will obtain wound culture. If CT is positive for abscess patient will need to be transferred as we do not have surgery on call this week.

## 2025-05-18 NOTE — SUBJECTIVE & OBJECTIVE
Past Medical History:   Diagnosis Date    Alcohol abuse     Anxiety     Cancer     Hyperlipidemia     Hypertension     Migraine headache        Past Surgical History:   Procedure Laterality Date    ANOSCOPY N/A 02/12/2020    Procedure: ANOSCOPY;  Surgeon: Musa Cannon MD;  Location: [facility] OR;  Service: General;  Laterality: N/A;    BREAST BIOPSY Right 2022    INCISION AND DRAINAGE OF BUTTOCK N/A 07/14/2021    Procedure: INCISION AND DRAINAGE, BUTTOCK;  Surgeon: Jayjay Swain MD;  Location: [facility] OR;  Service: General;  Laterality: N/A;  JESSEE-RECTAL    INCISION OF PERIRECTAL ABSCESS Left 02/12/2020    Procedure: INCISION, ABSCESS, PERIRECTAL;  Surgeon: Musa Cannon MD;  Location: [facility] OR;  Service: General;  Laterality: Left;       Review of patient's allergies indicates:   Allergen Reactions    Pcn [penicillins] Anaphylaxis    Shellfish containing products Anaphylaxis    Iodine Rash    Iodine and iodide containing products Itching and Rash       No current facility-administered medications on file prior to encounter.     Current Outpatient Medications on File Prior to Encounter   Medication Sig    atorvastatin (LIPITOR) 10 MG tablet Take 10 mg by mouth once daily.    atorvastatin (LIPITOR) 10 MG tablet Take 1 tablet by mouth every evening.    citalopram (CELEXA) 20 MG tablet Take 20 mg by mouth.    EPINEPHrine (EPIPEN 2-MAYELIN) 0.3 mg/0.3 mL AtIn Inject 0.6 mLs (0.6 mg total) into the muscle once. for 1 dose    hydrOXYzine HCL (ATARAX) 25 MG tablet Take 1 tablet (25 mg total) by mouth 3 (three) times daily as needed for Itching or Anxiety.    ibuprofen (ADVIL,MOTRIN) 600 MG tablet Take 1 tablet (600 mg total) by mouth every 6 (six) hours as needed for Pain.    montelukast (SINGULAIR) 10 mg tablet TAKE 1 TABLET BY MOUTH ONCE DAILY IN THE EVENING FOR ALLERGIES    ondansetron (ZOFRAN) 4 MG tablet Take 1 tablet (4 mg total) by mouth every 6 (six) hours.     Family History       Problem Relation (Age of  Onset)    Breast cancer Mother, Paternal Grandmother          Tobacco Use    Smoking status: Every Day     Current packs/day: 1.00     Average packs/day: 1 pack/day for 20.0 years (20.0 ttl pk-yrs)     Types: Cigarettes    Smokeless tobacco: Never   Substance and Sexual Activity    Alcohol use: Yes     Alcohol/week: 50.0 standard drinks of alcohol     Types: 50 Glasses of wine per week    Drug use: Yes     Frequency: 3.0 times per week     Types: Marijuana    Sexual activity: Yes     Partners: Male     Birth control/protection: None     Review of Systems   All other systems reviewed and are negative.    Objective:     Vital Signs (Most Recent):  Temp: 98 °F (36.7 °C) (05/18/25 1046)  Pulse: 83 (05/18/25 1145)  Resp: 18 (05/18/25 1145)  BP: 130/66 (05/18/25 1102)  SpO2: 96 % (05/18/25 1145) Vital Signs (24h Range):  Temp:  [98 °F (36.7 °C)] 98 °F (36.7 °C)  Pulse:  [] 83  Resp:  [16-18] 18  SpO2:  [96 %-97 %] 96 %  BP: (130-132)/(66-83) 130/66     Weight: 81.6 kg (180 lb)  Body mass index is 29.95 kg/m².     Physical Exam        Vitals reviewed  General: NAD, Well developed, Well Nourished  Head: NC/AT  Eyes: EOMI, LEIDY  Cardiovascular: Pulses intact distally, Regular Rate and rhythm  Pulmonary: Normal Respiratory Rate, No respiratory distress  Gi: Soft, Non-tender  Extremities: Warm, No edema present  Skin: Warm, dry  Neuro: Alert, Oriented x3, tremor present  Psych: Appropriate mood and affect       Significant Labs: All pertinent labs within the past 24 hours have been reviewed.    Significant Imaging: I have reviewed all pertinent imaging results/findings within the past 24 hours.

## 2025-05-19 ENCOUNTER — HOSPITAL ENCOUNTER (INPATIENT)
Facility: HOSPITAL | Age: 45
LOS: 2 days | Discharge: HOME HEALTH | DRG: 330 | End: 2025-05-21
Attending: HOSPITALIST | Admitting: HOSPITALIST
Payer: COMMERCIAL

## 2025-05-19 VITALS
RESPIRATION RATE: 22 BRPM | WEIGHT: 180 LBS | HEART RATE: 69 BPM | OXYGEN SATURATION: 100 % | BODY MASS INDEX: 29.99 KG/M2 | SYSTOLIC BLOOD PRESSURE: 151 MMHG | DIASTOLIC BLOOD PRESSURE: 75 MMHG | HEIGHT: 65 IN | TEMPERATURE: 98 F

## 2025-05-19 DIAGNOSIS — F10.920 ALCOHOLIC INTOXICATION WITHOUT COMPLICATION: Primary | ICD-10-CM

## 2025-05-19 DIAGNOSIS — K61.1 PERIRECTAL ABSCESS: ICD-10-CM

## 2025-05-19 PROBLEM — K61.0 PERIANAL ABSCESS: Status: ACTIVE | Noted: 2025-05-19

## 2025-05-19 PROBLEM — N30.00 ACUTE CYSTITIS: Status: ACTIVE | Noted: 2025-05-19

## 2025-05-19 LAB
ABSOLUTE EOSINOPHIL (OHS): 0.23 K/UL
ABSOLUTE MONOCYTE (OHS): 0.93 K/UL (ref 0.3–1)
ABSOLUTE NEUTROPHIL COUNT (OHS): 4.31 K/UL (ref 1.8–7.7)
ALBUMIN SERPL BCP-MCNC: 3.2 G/DL (ref 3.5–5.2)
ALP SERPL-CCNC: 60 UNIT/L (ref 40–150)
ALT SERPL W/O P-5'-P-CCNC: 13 UNIT/L (ref 10–44)
ANION GAP (OHS): 6 MMOL/L (ref 8–16)
AST SERPL-CCNC: 17 UNIT/L (ref 11–45)
BASOPHILS # BLD AUTO: 0.08 K/UL
BASOPHILS NFR BLD AUTO: 1.1 %
BILIRUB SERPL-MCNC: 0.5 MG/DL (ref 0.1–1)
BUN SERPL-MCNC: 10 MG/DL (ref 6–20)
CALCIUM SERPL-MCNC: 8.4 MG/DL (ref 8.7–10.5)
CHLORIDE SERPL-SCNC: 108 MMOL/L (ref 95–110)
CO2 SERPL-SCNC: 24 MMOL/L (ref 23–29)
CREAT SERPL-MCNC: 0.8 MG/DL (ref 0.5–1.4)
ERYTHROCYTE [DISTWIDTH] IN BLOOD BY AUTOMATED COUNT: 13.4 % (ref 11.5–14.5)
GFR SERPLBLD CREATININE-BSD FMLA CKD-EPI: >60 ML/MIN/1.73/M2
GLUCOSE SERPL-MCNC: 91 MG/DL (ref 70–110)
HCT VFR BLD AUTO: 36 % (ref 37–48.5)
HGB BLD-MCNC: 11.8 GM/DL (ref 12–16)
IMM GRANULOCYTES # BLD AUTO: 0.02 K/UL (ref 0–0.04)
IMM GRANULOCYTES NFR BLD AUTO: 0.3 % (ref 0–0.5)
INR PPP: 0.9 (ref 0.8–1.2)
LYMPHOCYTES # BLD AUTO: 1.99 K/UL (ref 1–4.8)
MAGNESIUM SERPL-MCNC: 1.6 MG/DL (ref 1.6–2.6)
MCH RBC QN AUTO: 31.1 PG (ref 27–31)
MCHC RBC AUTO-ENTMCNC: 32.8 G/DL (ref 32–36)
MCV RBC AUTO: 95 FL (ref 82–98)
NUCLEATED RBC (/100WBC) (OHS): 0 /100 WBC
OHS QRS DURATION: 64 MS
OHS QTC CALCULATION: 441 MS
PHOSPHATE SERPL-MCNC: 3.2 MG/DL (ref 2.7–4.5)
PLATELET # BLD AUTO: 316 K/UL (ref 150–450)
PMV BLD AUTO: 10.2 FL (ref 9.2–12.9)
POTASSIUM SERPL-SCNC: 4.1 MMOL/L (ref 3.5–5.1)
PROT SERPL-MCNC: 5.9 GM/DL (ref 6–8.4)
PROTHROMBIN TIME: 10.6 SECONDS (ref 9–12.5)
RBC # BLD AUTO: 3.79 M/UL (ref 4–5.4)
RELATIVE EOSINOPHIL (OHS): 3 %
RELATIVE LYMPHOCYTE (OHS): 26.3 % (ref 18–48)
RELATIVE MONOCYTE (OHS): 12.3 % (ref 4–15)
RELATIVE NEUTROPHIL (OHS): 57 % (ref 38–73)
SODIUM SERPL-SCNC: 138 MMOL/L (ref 136–145)
WBC # BLD AUTO: 7.56 K/UL (ref 3.9–12.7)

## 2025-05-19 PROCEDURE — 11000001 HC ACUTE MED/SURG PRIVATE ROOM

## 2025-05-19 PROCEDURE — 85610 PROTHROMBIN TIME: CPT | Performed by: STUDENT IN AN ORGANIZED HEALTH CARE EDUCATION/TRAINING PROGRAM

## 2025-05-19 PROCEDURE — 36415 COLL VENOUS BLD VENIPUNCTURE: CPT | Performed by: STUDENT IN AN ORGANIZED HEALTH CARE EDUCATION/TRAINING PROGRAM

## 2025-05-19 PROCEDURE — 85025 COMPLETE CBC W/AUTO DIFF WBC: CPT | Performed by: STUDENT IN AN ORGANIZED HEALTH CARE EDUCATION/TRAINING PROGRAM

## 2025-05-19 PROCEDURE — 83735 ASSAY OF MAGNESIUM: CPT | Performed by: STUDENT IN AN ORGANIZED HEALTH CARE EDUCATION/TRAINING PROGRAM

## 2025-05-19 PROCEDURE — 25000003 PHARM REV CODE 250: Performed by: STUDENT IN AN ORGANIZED HEALTH CARE EDUCATION/TRAINING PROGRAM

## 2025-05-19 PROCEDURE — 99233 SBSQ HOSP IP/OBS HIGH 50: CPT | Mod: ,,, | Performed by: STUDENT IN AN ORGANIZED HEALTH CARE EDUCATION/TRAINING PROGRAM

## 2025-05-19 PROCEDURE — 84100 ASSAY OF PHOSPHORUS: CPT | Performed by: STUDENT IN AN ORGANIZED HEALTH CARE EDUCATION/TRAINING PROGRAM

## 2025-05-19 PROCEDURE — 94761 N-INVAS EAR/PLS OXIMETRY MLT: CPT

## 2025-05-19 PROCEDURE — 80053 COMPREHEN METABOLIC PANEL: CPT | Performed by: STUDENT IN AN ORGANIZED HEALTH CARE EDUCATION/TRAINING PROGRAM

## 2025-05-19 PROCEDURE — 63600175 PHARM REV CODE 636 W HCPCS: Performed by: STUDENT IN AN ORGANIZED HEALTH CARE EDUCATION/TRAINING PROGRAM

## 2025-05-19 RX ADMIN — CIPROFLOXACIN 400 MG: 2 INJECTION, SOLUTION INTRAVENOUS at 11:05

## 2025-05-19 RX ADMIN — VANCOMYCIN HYDROCHLORIDE 1250 MG: 1.25 INJECTION, POWDER, LYOPHILIZED, FOR SOLUTION INTRAVENOUS at 05:05

## 2025-05-19 RX ADMIN — FOLIC ACID 1 MG: 1 TABLET ORAL at 09:05

## 2025-05-19 RX ADMIN — DIAZEPAM 5 MG: 5 TABLET ORAL at 02:05

## 2025-05-19 RX ADMIN — DIAZEPAM 5 MG: 5 TABLET ORAL at 05:05

## 2025-05-19 RX ADMIN — THIAMINE HYDROCHLORIDE 200 MG: 100 INJECTION, SOLUTION INTRAMUSCULAR; INTRAVENOUS at 09:05

## 2025-05-19 RX ADMIN — FAMOTIDINE 20 MG: 20 TABLET, FILM COATED ORAL at 09:05

## 2025-05-19 RX ADMIN — DIAZEPAM 5 MG: 5 TABLET ORAL at 09:05

## 2025-05-19 RX ADMIN — SODIUM CHLORIDE, POTASSIUM CHLORIDE, SODIUM LACTATE AND CALCIUM CHLORIDE: 600; 310; 30; 20 INJECTION, SOLUTION INTRAVENOUS at 05:05

## 2025-05-19 NOTE — PLAN OF CARE
The patient remained free from injury during shift.  Medication administered and tolerated well. The patient verbalized understanding of her plan of care.  The patient denies any questions or concerns.

## 2025-05-19 NOTE — LETTER
May 21, 2025       [address] [place] [address]  Phone: [phone]  Fax: [phone]         Kaylene Garcia  [address] [place] [address] [place] [address]        Kaylene Garcia    Was treated here from 5/19/25 to 05/21/2025    May Return to work/school when released by surgeon.             Sincerely,    Jessika Mckeon RN

## 2025-05-19 NOTE — PROVIDER TRANSFER
Outside Transfer Acceptance Note / Regional Referral Center    Referring facility: [facility]   Referring provider: BETTY JOHNSTON  Accepting facility: [facility]  Accepting provider: ZEFERINO ESPARZA  [facility]  Reason for transfer:  general surgery  Transfer diagnosis: mariangel-rectal abscess  Transfer specialty requested: General Surgery  Transfer specialty notified: Yes  Transfer level: NUMBER 1-5: 2  Bed type requested: medtele  Isolation status: No active isolations   Admission class or status: IP- Inpatient    Narrative     43 yo w/ hx of ETOH abuse, HTN, HLD, Breast cancer presenting with symptoms of ETOH withdrawal on 5/18 with last drink earlier that morning. Drank very heavily this weekend. She has been drinking a box of wine daily for past few months since she relapsed. She had been sober for 4 months prior to this. She states she was diagnosed with breast cancer and this caused her to relapse. No hx of withdrawal requiring admission. She states she might have had withdrawal seizure the night before but was still very intoxicated. Patient admitted for acute ETOH withdrawal and placed on benzo taper. Also found to have acute cystitis. On abx. Evening after admission patient disclosed that she has hx of perirectal abscess that has recurred. On exam I noted likely abscess next to scar tissue from previous surgery with mild drainage. CT shows abscess likely abscess formation vs fistula. Dr. Rizzo (gen surg) has agreed to consult while patient is on medicine service. Her vitals are within acceptable limits at this time. She has received cipro and vancomycin.    Objective     Vitals: Temp: 98.3 °F (36.8 °C) (05/19/25 0737)  Pulse: 66 (05/19/25 0800)  Resp: 18 (05/19/25 0737)  BP: 123/69 (05/19/25 0737)  SpO2: 96 % (05/19/25 0737)  Recent Labs   Lab 05/18/25  1120 05/19/25  0621    138   K 3.8 4.1    108   CO2 18* 24   BUN 12 10  "  CREATININE 0.8 0.8   GLU 91 91   CALCIUM 9.0 8.4*   MG  --  1.6   PHOS  --  3.2     Recent Labs   Lab 05/18/25  1120 05/19/25  0621   ALKPHOS 72 60   ALT 19 13   AST 22 17   ALBUMIN 3.9 3.2*   PROT 7.3 5.9*   BILITOT 0.1 0.5   INR  --  0.9       Recent Labs   Lab 05/18/25  1120 05/19/25  0621   WBC 15.10* 7.56   HGB 12.8 11.8*   HCT 37.9 36.0*    316   LYMPH 2.99  19.8 1.99  26.3   MONO 7.4  1.12* 12.3  0.93       No results for input(s): "POCTGLUCOSE" in the last 168 hours.  Recent imaging:CT Pelvis Without Contrast  Narrative: EXAMINATION:  CT PELVIS WITHOUT CONTRAST    CLINICAL HISTORY:  Anal/rectal abscess;    TECHNIQUE:  Multiplanar images were obtained of the pelvis from the iliac crests through the symphysis pubis without intravenous contrast.    COMPARISON:  CT of the abdomen and pelvis from 02/10/2024.    FINDINGS:  There is mild ill-defined soft tissue thickening in the left perianal soft tissues, extending to the medial gluteal fold (series 2, image 95), concerning for a small developing phlegmon, fistula, or abscess.  The collection measures approximately 2.0 x 1.0 cm.  No evidence of soft tissue gas.  There is mild adjacent subcutaneous soft tissue stranding in the left medial gluteal fold.  The right medial gluteal fold and perianal soft tissues are unremarkable.  There is colonic diverticulosis without evidence of acute diverticulitis.  The appendix is in the left lower quadrant and is unremarkable.  Partially visualized loops of small and large bowel are otherwise unremarkable.  The uterus and adnexae are unremarkable.  The urinary bladder is unremarkable.  Osseous structures are intact.  Muscle bulk is maintained.  The partially visualized body wall is unremarkable.  Impression: Left perianal phlegmon or early abscess as above.  Fistula not excluded.    Electronically signed by: Veto Olmedo  Date:    05/18/2025  Time:    20:43    Airway:   room air    IV access:        Peripheral IV - " Single Lumen 05/18/25 2250 20 G Posterior;Right Wrist (Active)   Site Assessment Clean;Dry;Intact 05/19/25 0400   Line Status Flushed 05/19/25 0400   Dressing Status Clean;Dry;Intact 05/19/25 0400   Dressing Intervention Integrity maintained 05/19/25 0400            Peripheral IV - Single Lumen 05/19/25 0000 22 G No Anterior;Left Upper Arm (Active)     Allergies:   Review of patient's allergies indicates:   Allergen Reactions    Pcn [penicillins] Anaphylaxis    Shellfish containing products Anaphylaxis    Iodine Rash    Iodine and iodide containing products Itching and Rash      NPO: Yes    Anticoagulation:   Anticoagulants       None             Instructions      [facility]  Admit to Hospital Medicine  Upon patient arrival to floor, please contact Hospital Medicine on call.

## 2025-05-19 NOTE — PLAN OF CARE
Bed in lowest position, bed alarm set, call light within reach. Fluids continuous. Vanc currently infusing. Patient up to toilet, steady on her feet. Transfer to [facility], awaiting a bed assignment. Vital signs stable. No needs at this time  Problem: Adult Inpatient Plan of Care  Goal: Plan of Care Review  Outcome: Progressing  Goal: Patient-Specific Goal (Individualized)  Outcome: Progressing  Goal: Absence of Hospital-Acquired Illness or Injury  Outcome: Progressing  Goal: Optimal Comfort and Wellbeing  Outcome: Progressing  Goal: Readiness for Transition of Care  Outcome: Progressing     Problem: Wound  Goal: Optimal Coping  Outcome: Progressing  Goal: Optimal Functional Ability  Outcome: Progressing  Goal: Absence of Infection Signs and Symptoms  Outcome: Progressing  Goal: Improved Oral Intake  Outcome: Progressing  Goal: Optimal Pain Control and Function  Outcome: Progressing  Goal: Skin Health and Integrity  Outcome: Progressing  Goal: Optimal Wound Healing  Outcome: Progressing

## 2025-05-19 NOTE — SUBJECTIVE & OBJECTIVE
Interval History: NAEON. Transfer process initiated. NPO. Doing well from withdrawal standpoint.    Review of Systems   All other systems reviewed and are negative.    Objective:     Vital Signs (Most Recent):  Temp: 98.3 °F (36.8 °C) (05/19/25 0737)  Pulse: 66 (05/19/25 0800)  Resp: 18 (05/19/25 0737)  BP: 123/69 (05/19/25 0737)  SpO2: 96 % (05/19/25 0737) Vital Signs (24h Range):  Temp:  [97.9 °F (36.6 °C)-98.8 °F (37.1 °C)] 98.3 °F (36.8 °C)  Pulse:  [] 66  Resp:  [16-22] 18  SpO2:  [95 %-97 %] 96 %  BP: (101-132)/(57-83) 123/69     Weight: 81.6 kg (180 lb)  Body mass index is 29.95 kg/m².    Intake/Output Summary (Last 24 hours) at 5/19/2025 0920  Last data filed at 5/19/2025 0415  Gross per 24 hour   Intake 680 ml   Output --   Net 680 ml         Physical Exam      Vitals reviewed  General: NAD, Well developed, Well Nourished  Head: NC/AT  Eyes: EOMI, LEIDY  Cardiovascular: Pulses intact distally, Regular Rate and rhythm  Pulmonary: Normal Respiratory Rate, No respiratory distress  Gi: Soft, Non-tender  Extremities: Warm, No edema present  Skin: Warm, dry  Neuro: Alert, Oriented x3, No focal Deficit  Psych: Appropriate mood and affect        Significant Labs: All pertinent labs within the past 24 hours have been reviewed.    Significant Imaging: I have reviewed all pertinent imaging results/findings within the past 24 hours.

## 2025-05-19 NOTE — PLAN OF CARE
[facility] - Comprehensive Care Unit  Initial Discharge Assessment       Primary Care Provider: Josefina Hartman (Inactive)    Admission Diagnosis: Chest pain [R07.9]  Alcohol withdrawal syndrome with complication [F10.939]    Admission Date: 5/18/2025  Expected Discharge Date:     Transition of Care Barriers: Substance Abuse    Patient alert & oriented lives at home with her spouse. She works full time at [employer]. She is independent at home. She has been drinking at least a box of wine daily after work.She does a lcard for medical marjiuana. She uses Soraya Bales, NP with [facility] for PCP, Dr Webster surgery & Dr Lunsford for plastic surgery who are both in [place]. She does have a mariangel-rectal abscess & will need to be transferred. She is aware of the plan for transfer. Will continue to follow.       Payor: [employer] CR[employer] [facility] / Plan: [facility] / Product Type: Commercial /     Extended Emergency Contact Information  Primary Emergency Contact: JUDSON CAVAZOS JR  Mobile Phone: [phone]  Relation: Spouse  Preferred language: English   needed? No    Discharge Plan A: Hospital Transfer  Discharge Plan B: Home with family      [facility] - [place], [place] - [address]  [address]  [facility] [address]  Phone: [phone] Fax: [phone]    [facility] - [place], [place] - [address]  [address]  [place] [address]  Phone: [phone] Fax: [phone]      Initial Assessment (most recent)       Adult Discharge Assessment - 05/19/25 1116          Discharge Assessment    Assessment Type Discharge Planning Assessment     Confirmed/corrected address, phone number and insurance Yes     Confirmed Demographics Correct on Facesheet     Source of Information patient     When was your last doctors appointment? --   1 week ago    Communicated SHARMIN with patient/caregiver Yes     Reason For Admission perirectal abscess     People in Home spouse     Do you expect to  return to your current living situation? Yes     Do you have help at home or someone to help you manage your care at home? Yes     Who are your caregiver(s) and their phone number(s)? Lobo Garcia spouse [phone]     Prior to hospitilization cognitive status: Alert/Oriented     Current cognitive status: Alert/Oriented     Walking or Climbing Stairs Difficulty no     Dressing/Bathing Difficulty no     Home Accessibility stairs within home     Number of Stairs, Within Home, Primary --   15 steps    Home Layout Bathroom on 2nd floor;Bedroom on 2nd floor     Equipment Currently Used at Home blood pressure machine     Readmission within 30 days? No     Patient currently being followed by outpatient case management? No     Do you currently have service(s) that help you manage your care at home? No     Do you take prescription medications? Yes     Do you have any problems affording any of your prescribed medications? No     Is the patient taking medications as prescribed? yes     Who is going to help you get home at discharge? Lobo Garcia spouse [phone]     How do you get to doctors appointments? car, drives self     Are you on dialysis? No     Do you take coumadin? No     Discharge Plan A Hospital Transfer     Discharge Plan B Home with family     DME Needed Upon Discharge  none     Discharge Plan discussed with: Patient     Transition of Care Barriers Substance Abuse        Physical Activity    On average, how many days per week do you engage in moderate to strenuous exercise (like a brisk walk)? 5 days     On average, how many minutes do you engage in exercise at this level? 150+ min        Financial Resource Strain    How hard is it for you to pay for the very basics like food, housing, medical care, and heating? Not hard at all        Housing Stability    In the last 12 months, was there a time when you were not able to pay the mortgage or rent on time? No        Transportation Needs    In the past 12  months, has lack of transportation kept you from medical appointments or from getting medications? No        Food Insecurity    Within the past 12 months, you worried that your food would run out before you got the money to buy more. Never true     Within the past 12 months, the food you bought just didn't last and you didn't have money to get more. Never true        Stress    Do you feel stress - tense, restless, nervous, or anxious, or unable to sleep at night because your mind is troubled all the time - these days? Very much        Social Isolation    How often do you feel lonely or isolated from those around you?  Never        Alcohol Use    Q1: How often do you have a drink containing alcohol? 4 or more times a week     Q2: How many drinks containing alcohol do you have on a typical day when you are drinking? 10 or more   drinks box of wine-4 bottles almost daily    Q3: How often do you have six or more drinks on one occasion? Daily or almost daily        Utilities    In the past 12 months has the electric, gas, oil, or water company threatened to shut off services in your home? No        Health Literacy    How often do you need to have someone help you when you read instructions, pamphlets, or other written material from your doctor or pharmacy? Never        OTHER    Name(s) of People in Home Lobo Garcia spouse [phone]

## 2025-05-19 NOTE — HOSPITAL COURSE
Patient admitted for acute ETOH withdrawal. Also found to have acute cystitis. On abx. Evening after admission patient disclosed that she has hx of perirectal abscess that has recurred. On exam I noted likely abscess next to scar tissue from previous surgery with mild drainage. CT shows abscess likely abscess formation vs fistula. We do not have general surgery coverage this week. Patient will need transfer for general surgery consult and definitive management of abscess.

## 2025-05-19 NOTE — PROGRESS NOTES
[facility] Medicine  Progress Note    Patient Name: Kaylene Garcia  MRN: [id number]  Patient Class: IP- Inpatient   Admission Date: 5/18/2025  Length of Stay: 1 days  Attending Physician: Rodrigo Haney MD  Primary Care Provider: Josefina Hartman (Inactive)        Subjective     Principal Problem:Alcohol withdrawal syndrome with complication        HPI:  43 yo w/ hx of ETOH abuse, HTN, HLD, Breast cancer presenting with symptoms of ETOH withdrawal. Last drink this am. Drank very heavily this weekend. She has been drinking a box of wine daily for past few months since she relapsed. She had been sober for 4 months prior to this. She states she was diagnosed with breast cancer and this caused her to relapse. No hx of withdrawal requiring admission. She states she might have had withdrawal seizure last night but was still very intoxicated. No fever, chills, vomiting, or diarrhea. No abdominal pain. No dysuria. Endorses nausea.    Overview/Hospital Course:  Patient admitted for acute ETOH withdrawal. Also found to have acute cystitis. On abx. Evening after admission patient disclosed that she has hx of perirectal abscess that has recurred. On exam I noted likely abscess next to scar tissue from previous surgery with mild drainage. CT shows abscess likely abscess formation vs fistula. We do not have general surgery coverage this week. Patient will need transfer for general surgery consult and definitive management of abscess.     Interval History: NAEON. Transfer process initiated. NPO. Doing well from withdrawal standpoint.    Review of Systems   All other systems reviewed and are negative.    Objective:     Vital Signs (Most Recent):  Temp: 98.3 °F (36.8 °C) (05/19/25 0737)  Pulse: 66 (05/19/25 0800)  Resp: 18 (05/19/25 0737)  BP: 123/69 (05/19/25 0737)  SpO2: 96 % (05/19/25 0737) Vital Signs (24h Range):  Temp:  [97.9 °F (36.6 °C)-98.8 °F (37.1 °C)] 98.3 °F (36.8 °C)  Pulse:   [] 66  Resp:  [16-22] 18  SpO2:  [95 %-97 %] 96 %  BP: (101-132)/(57-83) 123/69     Weight: 81.6 kg (180 lb)  Body mass index is 29.95 kg/m².    Intake/Output Summary (Last 24 hours) at 5/19/2025 0920  Last data filed at 5/19/2025 0415  Gross per 24 hour   Intake 680 ml   Output --   Net 680 ml         Physical Exam      Vitals reviewed  General: NAD, Well developed, Well Nourished  Head: NC/AT  Eyes: EOMI, LEIDY  Cardiovascular: Pulses intact distally, Regular Rate and rhythm  Pulmonary: Normal Respiratory Rate, No respiratory distress  Gi: Soft, Non-tender  Extremities: Warm, No edema present  Skin: Warm, dry  Neuro: Alert, Oriented x3, No focal Deficit  Psych: Appropriate mood and affect        Significant Labs: All pertinent labs within the past 24 hours have been reviewed.    Significant Imaging: I have reviewed all pertinent imaging results/findings within the past 24 hours.      Assessment & Plan  Alcohol withdrawal syndrome with complication  Patient having mild withdrawal symptoms with positive ETOH  Possible withdrawal seizure last night but this is unlikely as she was still heavily intoxicated at that time  Start valium taper  PRN ativan with CIWA q4h  Thiamine and folic acid  Pepcid BID  IVF  Counseled on cessation    Anxiety  Continue home medication    Mixed hyperlipidemia  Hold statin for now    Tobacco dependency  Dangers of cigarette smoking were reviewed with patient in detail. Patient was Counseled for 3-10 minutes. Nicotine replacement options were discussed. Nicotine replacement was discussed- prescribed  Acute cystitis  IV abx  F/U UCx    Perianal abscess  IV abx  F/U wound culture  Transfer process initiated for general surgery consult    VTE Risk Mitigation (From admission, onward)           Ordered     IP VTE LOW RISK PATIENT  Once         05/18/25 1242     Place sequential compression device  Until discontinued         05/18/25 1242                    Discharge Planning   SHARMIN:       Code Status: Full Code   Medical Readiness for Discharge Date:                            Rodrigo Haney MD  Department of Hospital Medicine   [facility]

## 2025-05-20 ENCOUNTER — ANESTHESIA EVENT (OUTPATIENT)
Dept: SURGERY | Facility: HOSPITAL | Age: 45
DRG: 330 | End: 2025-05-20
Payer: COMMERCIAL

## 2025-05-20 ENCOUNTER — ANESTHESIA (OUTPATIENT)
Dept: SURGERY | Facility: HOSPITAL | Age: 45
DRG: 330 | End: 2025-05-20
Payer: COMMERCIAL

## 2025-05-20 PROBLEM — F10.929 ALCOHOL INTOXICATION: Status: ACTIVE | Noted: 2025-05-20

## 2025-05-20 LAB
ABSOLUTE EOSINOPHIL (SMH): 0.21 K/UL
ABSOLUTE MONOCYTE (SMH): 0.75 K/UL (ref 0.3–1)
ABSOLUTE NEUTROPHIL COUNT (SMH): 3.5 K/UL (ref 1.8–7.7)
AMPHET UR QL SCN: NEGATIVE
ANION GAP (SMH): 9 MMOL/L (ref 8–16)
BACTERIA UR CULT: ABNORMAL
BARBITURATE SCN PRESENT UR: NEGATIVE
BASOPHILS # BLD AUTO: 0.06 K/UL
BASOPHILS NFR BLD AUTO: 1 %
BENZODIAZ UR QL SCN: ABNORMAL
BUN SERPL-MCNC: 9 MG/DL (ref 6–20)
CALCIUM SERPL-MCNC: 8.9 MG/DL (ref 8.7–10.5)
CANNABINOIDS UR QL SCN: ABNORMAL
CHLORIDE SERPL-SCNC: 108 MMOL/L (ref 95–110)
CO2 SERPL-SCNC: 22 MMOL/L (ref 23–29)
COCAINE UR QL SCN: NEGATIVE
CREAT SERPL-MCNC: 0.8 MG/DL (ref 0.5–1.4)
CREAT UR-MCNC: 85.6 MG/DL (ref 15–325)
EAG (SMH): 97 MG/DL (ref 68–131)
ERYTHROCYTE [DISTWIDTH] IN BLOOD BY AUTOMATED COUNT: 13.5 % (ref 11.5–14.5)
GFR SERPLBLD CREATININE-BSD FMLA CKD-EPI: >60 ML/MIN/1.73/M2
GLUCOSE SERPL-MCNC: 97 MG/DL (ref 70–110)
HBA1C MFR BLD: 5 % (ref 4.5–6.2)
HCT VFR BLD AUTO: 37.7 % (ref 37–48.5)
HGB BLD-MCNC: 12.1 GM/DL (ref 12–16)
HIV1+2 IGG SERPL QL IA.RAPID: NEGATIVE
IMM GRANULOCYTES # BLD AUTO: 0.02 K/UL (ref 0–0.04)
IMM GRANULOCYTES NFR BLD AUTO: 0.3 % (ref 0–0.5)
LYMPHOCYTES # BLD AUTO: 1.61 K/UL (ref 1–4.8)
MAGNESIUM SERPL-MCNC: 1.8 MG/DL (ref 1.6–2.6)
MCH RBC QN AUTO: 31.4 PG (ref 27–31)
MCHC RBC AUTO-ENTMCNC: 32.1 G/DL (ref 32–36)
MCV RBC AUTO: 98 FL (ref 82–98)
NUCLEATED RBC (/100WBC) (SMH): 0 /100 WBC
OPIATES UR QL SCN: ABNORMAL
PCP UR QL: NEGATIVE
PHOSPHATE SERPL-MCNC: 3.5 MG/DL (ref 2.7–4.5)
PLATELET # BLD AUTO: 296 K/UL (ref 150–450)
PMV BLD AUTO: 10.1 FL (ref 9.2–12.9)
POTASSIUM SERPL-SCNC: 3.8 MMOL/L (ref 3.5–5.1)
RBC # BLD AUTO: 3.85 M/UL (ref 4–5.4)
RELATIVE EOSINOPHIL (SMH): 3.4 % (ref 0–8)
RELATIVE LYMPHOCYTE (SMH): 26.3 % (ref 18–48)
RELATIVE MONOCYTE (SMH): 12.3 % (ref 4–15)
RELATIVE NEUTROPHIL (SMH): 56.7 % (ref 38–73)
SODIUM SERPL-SCNC: 139 MMOL/L (ref 136–145)
VANCOMYCIN TROUGH SERPL-MCNC: 10.3 UG/ML (ref ?–20)
WBC # BLD AUTO: 6.12 K/UL (ref 3.9–12.7)

## 2025-05-20 PROCEDURE — 94760 N-INVAS EAR/PLS OXIMETRY 1: CPT

## 2025-05-20 PROCEDURE — 25000003 PHARM REV CODE 250

## 2025-05-20 PROCEDURE — 84100 ASSAY OF PHOSPHORUS: CPT

## 2025-05-20 PROCEDURE — 37000009 HC ANESTHESIA EA ADD 15 MINS: Performed by: SURGERY

## 2025-05-20 PROCEDURE — 63600175 PHARM REV CODE 636 W HCPCS

## 2025-05-20 PROCEDURE — 36000705 HC OR TIME LEV I EA ADD 15 MIN: Performed by: SURGERY

## 2025-05-20 PROCEDURE — 63600175 PHARM REV CODE 636 W HCPCS: Performed by: HOSPITALIST

## 2025-05-20 PROCEDURE — 46040 I&D ISCHIORCT&/PERIRCT ABSC: CPT | Mod: ,,, | Performed by: SURGERY

## 2025-05-20 PROCEDURE — 86703 HIV-1/HIV-2 1 RESULT ANTBDY: CPT | Performed by: HOSPITALIST

## 2025-05-20 PROCEDURE — 0D9P00Z DRAINAGE OF RECTUM WITH DRAINAGE DEVICE, OPEN APPROACH: ICD-10-PCS | Performed by: SURGERY

## 2025-05-20 PROCEDURE — 83036 HEMOGLOBIN GLYCOSYLATED A1C: CPT | Performed by: HOSPITALIST

## 2025-05-20 PROCEDURE — 25000003 PHARM REV CODE 250: Performed by: SURGERY

## 2025-05-20 PROCEDURE — 71000039 HC RECOVERY, EACH ADD'L HOUR: Performed by: SURGERY

## 2025-05-20 PROCEDURE — 36415 COLL VENOUS BLD VENIPUNCTURE: CPT | Performed by: HOSPITALIST

## 2025-05-20 PROCEDURE — 80048 BASIC METABOLIC PNL TOTAL CA: CPT

## 2025-05-20 PROCEDURE — 63600175 PHARM REV CODE 636 W HCPCS: Mod: JZ | Performed by: ANESTHESIOLOGY

## 2025-05-20 PROCEDURE — 87340 HEPATITIS B SURFACE AG IA: CPT | Performed by: HOSPITALIST

## 2025-05-20 PROCEDURE — 80202 ASSAY OF VANCOMYCIN: CPT

## 2025-05-20 PROCEDURE — 87102 FUNGUS ISOLATION CULTURE: CPT | Performed by: SURGERY

## 2025-05-20 PROCEDURE — 37000008 HC ANESTHESIA 1ST 15 MINUTES: Performed by: SURGERY

## 2025-05-20 PROCEDURE — 36415 COLL VENOUS BLD VENIPUNCTURE: CPT

## 2025-05-20 PROCEDURE — 36000704 HC OR TIME LEV I 1ST 15 MIN: Performed by: SURGERY

## 2025-05-20 PROCEDURE — 87205 SMEAR GRAM STAIN: CPT | Performed by: SURGERY

## 2025-05-20 PROCEDURE — 80307 DRUG TEST PRSMV CHEM ANLYZR: CPT | Performed by: HOSPITALIST

## 2025-05-20 PROCEDURE — 85025 COMPLETE CBC W/AUTO DIFF WBC: CPT

## 2025-05-20 PROCEDURE — 86706 HEP B SURFACE ANTIBODY: CPT | Performed by: HOSPITALIST

## 2025-05-20 PROCEDURE — 25000003 PHARM REV CODE 250: Performed by: HOSPITALIST

## 2025-05-20 PROCEDURE — 99223 1ST HOSP IP/OBS HIGH 75: CPT | Mod: 57,ICN,, | Performed by: SURGERY

## 2025-05-20 PROCEDURE — 71000033 HC RECOVERY, INTIAL HOUR: Performed by: SURGERY

## 2025-05-20 PROCEDURE — 86704 HEP B CORE ANTIBODY TOTAL: CPT | Performed by: HOSPITALIST

## 2025-05-20 PROCEDURE — 87075 CULTR BACTERIA EXCEPT BLOOD: CPT | Performed by: SURGERY

## 2025-05-20 PROCEDURE — 63600175 PHARM REV CODE 636 W HCPCS: Mod: JZ

## 2025-05-20 PROCEDURE — 87206 SMEAR FLUORESCENT/ACID STAI: CPT | Performed by: SURGERY

## 2025-05-20 PROCEDURE — 87116 MYCOBACTERIA CULTURE: CPT | Performed by: SURGERY

## 2025-05-20 PROCEDURE — 11000001 HC ACUTE MED/SURG PRIVATE ROOM

## 2025-05-20 PROCEDURE — 83735 ASSAY OF MAGNESIUM: CPT

## 2025-05-20 PROCEDURE — 87070 CULTURE OTHR SPECIMN AEROBIC: CPT | Performed by: SURGERY

## 2025-05-20 PROCEDURE — 25000003 PHARM REV CODE 250: Performed by: ANESTHESIOLOGY

## 2025-05-20 PROCEDURE — 86803 HEPATITIS C AB TEST: CPT | Performed by: HOSPITALIST

## 2025-05-20 RX ORDER — FOLIC ACID 1 MG/1
1 TABLET ORAL DAILY
Status: DISCONTINUED | OUTPATIENT
Start: 2025-05-20 | End: 2025-05-21 | Stop reason: HOSPADM

## 2025-05-20 RX ORDER — HYDROCODONE BITARTRATE AND ACETAMINOPHEN 5; 325 MG/1; MG/1
1 TABLET ORAL EVERY 6 HOURS PRN
Refills: 0 | Status: DISCONTINUED | OUTPATIENT
Start: 2025-05-20 | End: 2025-05-21 | Stop reason: HOSPADM

## 2025-05-20 RX ORDER — ONDANSETRON HYDROCHLORIDE 2 MG/ML
4 INJECTION, SOLUTION INTRAVENOUS DAILY PRN
Status: DISCONTINUED | OUTPATIENT
Start: 2025-05-20 | End: 2025-05-21 | Stop reason: HOSPADM

## 2025-05-20 RX ORDER — SODIUM CHLORIDE 0.9 % (FLUSH) 0.9 %
10 SYRINGE (ML) INJECTION EVERY 12 HOURS PRN
Status: DISCONTINUED | OUTPATIENT
Start: 2025-05-20 | End: 2025-05-21 | Stop reason: HOSPADM

## 2025-05-20 RX ORDER — DEXMEDETOMIDINE HYDROCHLORIDE 100 UG/ML
INJECTION, SOLUTION INTRAVENOUS
Status: DISCONTINUED | OUTPATIENT
Start: 2025-05-20 | End: 2025-05-20

## 2025-05-20 RX ORDER — SODIUM CHLORIDE, SODIUM LACTATE, POTASSIUM CHLORIDE, CALCIUM CHLORIDE 600; 310; 30; 20 MG/100ML; MG/100ML; MG/100ML; MG/100ML
INJECTION, SOLUTION INTRAVENOUS CONTINUOUS
Status: DISCONTINUED | OUTPATIENT
Start: 2025-05-20 | End: 2025-05-21 | Stop reason: HOSPADM

## 2025-05-20 RX ORDER — CIPROFLOXACIN 2 MG/ML
400 INJECTION, SOLUTION INTRAVENOUS
Status: DISCONTINUED | OUTPATIENT
Start: 2025-05-20 | End: 2025-05-20

## 2025-05-20 RX ORDER — GLUCAGON 1 MG
1 KIT INJECTION
Status: DISCONTINUED | OUTPATIENT
Start: 2025-05-20 | End: 2025-05-21 | Stop reason: HOSPADM

## 2025-05-20 RX ORDER — FAMOTIDINE 10 MG/ML
INJECTION, SOLUTION INTRAVENOUS
Status: DISCONTINUED | OUTPATIENT
Start: 2025-05-20 | End: 2025-05-20

## 2025-05-20 RX ORDER — ACETAMINOPHEN 10 MG/ML
INJECTION, SOLUTION INTRAVENOUS
Status: DISCONTINUED | OUTPATIENT
Start: 2025-05-20 | End: 2025-05-20

## 2025-05-20 RX ORDER — MIDAZOLAM HYDROCHLORIDE 1 MG/ML
INJECTION INTRAMUSCULAR; INTRAVENOUS
Status: DISCONTINUED | OUTPATIENT
Start: 2025-05-20 | End: 2025-05-20

## 2025-05-20 RX ORDER — ACETAMINOPHEN 325 MG/1
650 TABLET ORAL EVERY 4 HOURS PRN
Status: DISCONTINUED | OUTPATIENT
Start: 2025-05-20 | End: 2025-05-21 | Stop reason: HOSPADM

## 2025-05-20 RX ORDER — IBUPROFEN 200 MG
24 TABLET ORAL
Status: DISCONTINUED | OUTPATIENT
Start: 2025-05-20 | End: 2025-05-21 | Stop reason: HOSPADM

## 2025-05-20 RX ORDER — LORAZEPAM 2 MG/ML
2 INJECTION INTRAMUSCULAR
Status: DISCONTINUED | OUTPATIENT
Start: 2025-05-20 | End: 2025-05-21 | Stop reason: HOSPADM

## 2025-05-20 RX ORDER — DIPHENHYDRAMINE HYDROCHLORIDE 50 MG/ML
12.5 INJECTION, SOLUTION INTRAMUSCULAR; INTRAVENOUS
Status: DISCONTINUED | OUTPATIENT
Start: 2025-05-20 | End: 2025-05-21 | Stop reason: HOSPADM

## 2025-05-20 RX ORDER — SODIUM CHLORIDE, SODIUM LACTATE, POTASSIUM CHLORIDE, CALCIUM CHLORIDE 600; 310; 30; 20 MG/100ML; MG/100ML; MG/100ML; MG/100ML
INJECTION, SOLUTION INTRAVENOUS CONTINUOUS PRN
Status: DISCONTINUED | OUTPATIENT
Start: 2025-05-20 | End: 2025-05-20

## 2025-05-20 RX ORDER — ONDANSETRON HYDROCHLORIDE 2 MG/ML
INJECTION, SOLUTION INTRAMUSCULAR; INTRAVENOUS
Status: DISCONTINUED | OUTPATIENT
Start: 2025-05-20 | End: 2025-05-20

## 2025-05-20 RX ORDER — ENOXAPARIN SODIUM 100 MG/ML
40 INJECTION SUBCUTANEOUS EVERY 24 HOURS
Status: DISCONTINUED | OUTPATIENT
Start: 2025-05-20 | End: 2025-05-21 | Stop reason: HOSPADM

## 2025-05-20 RX ORDER — SUCCINYLCHOLINE CHLORIDE 20 MG/ML
INJECTION INTRAMUSCULAR; INTRAVENOUS
Status: DISCONTINUED | OUTPATIENT
Start: 2025-05-20 | End: 2025-05-20

## 2025-05-20 RX ORDER — LANOLIN ALCOHOL/MO/W.PET/CERES
800 CREAM (GRAM) TOPICAL
Status: DISCONTINUED | OUTPATIENT
Start: 2025-05-20 | End: 2025-05-21 | Stop reason: HOSPADM

## 2025-05-20 RX ORDER — HYDROMORPHONE HYDROCHLORIDE 1 MG/ML
0.2 INJECTION, SOLUTION INTRAMUSCULAR; INTRAVENOUS; SUBCUTANEOUS EVERY 5 MIN PRN
Status: DISCONTINUED | OUTPATIENT
Start: 2025-05-20 | End: 2025-05-21 | Stop reason: HOSPADM

## 2025-05-20 RX ORDER — ONDANSETRON HYDROCHLORIDE 2 MG/ML
4 INJECTION, SOLUTION INTRAVENOUS EVERY 6 HOURS PRN
Status: DISCONTINUED | OUTPATIENT
Start: 2025-05-20 | End: 2025-05-21 | Stop reason: HOSPADM

## 2025-05-20 RX ORDER — HYDROMORPHONE HYDROCHLORIDE 1 MG/ML
1 INJECTION, SOLUTION INTRAMUSCULAR; INTRAVENOUS; SUBCUTANEOUS EVERY 4 HOURS PRN
Refills: 0 | Status: DISCONTINUED | OUTPATIENT
Start: 2025-05-20 | End: 2025-05-21 | Stop reason: HOSPADM

## 2025-05-20 RX ORDER — ALUMINUM HYDROXIDE, MAGNESIUM HYDROXIDE, AND SIMETHICONE 1200; 120; 1200 MG/30ML; MG/30ML; MG/30ML
30 SUSPENSION ORAL 4 TIMES DAILY PRN
Status: DISCONTINUED | OUTPATIENT
Start: 2025-05-20 | End: 2025-05-21 | Stop reason: HOSPADM

## 2025-05-20 RX ORDER — AMOXICILLIN 250 MG
1 CAPSULE ORAL 2 TIMES DAILY
Status: DISCONTINUED | OUTPATIENT
Start: 2025-05-20 | End: 2025-05-21 | Stop reason: HOSPADM

## 2025-05-20 RX ORDER — NALOXONE HCL 0.4 MG/ML
0.02 VIAL (ML) INJECTION
Status: DISCONTINUED | OUTPATIENT
Start: 2025-05-20 | End: 2025-05-21 | Stop reason: HOSPADM

## 2025-05-20 RX ORDER — PROPOFOL 10 MG/ML
VIAL (ML) INTRAVENOUS
Status: DISCONTINUED | OUTPATIENT
Start: 2025-05-20 | End: 2025-05-20

## 2025-05-20 RX ORDER — OXYCODONE HYDROCHLORIDE 5 MG/1
5 TABLET ORAL
Status: DISCONTINUED | OUTPATIENT
Start: 2025-05-20 | End: 2025-05-20

## 2025-05-20 RX ORDER — ATORVASTATIN CALCIUM 10 MG/1
10 TABLET, FILM COATED ORAL NIGHTLY
Status: DISCONTINUED | OUTPATIENT
Start: 2025-05-20 | End: 2025-05-21 | Stop reason: HOSPADM

## 2025-05-20 RX ORDER — BUPIVACAINE HCL/EPINEPHRINE 0.25-.0005
VIAL (ML) INJECTION
Status: DISCONTINUED | OUTPATIENT
Start: 2025-05-20 | End: 2025-05-20 | Stop reason: HOSPADM

## 2025-05-20 RX ORDER — LIDOCAINE HYDROCHLORIDE 20 MG/ML
INJECTION, SOLUTION EPIDURAL; INFILTRATION; INTRACAUDAL; PERINEURAL
Status: DISCONTINUED | OUTPATIENT
Start: 2025-05-20 | End: 2025-05-20

## 2025-05-20 RX ORDER — FENTANYL CITRATE 50 UG/ML
INJECTION, SOLUTION INTRAMUSCULAR; INTRAVENOUS
Status: DISCONTINUED | OUTPATIENT
Start: 2025-05-20 | End: 2025-05-20

## 2025-05-20 RX ORDER — TRAZODONE HYDROCHLORIDE 50 MG/1
50 TABLET ORAL NIGHTLY PRN
Status: DISCONTINUED | OUTPATIENT
Start: 2025-05-20 | End: 2025-05-21 | Stop reason: HOSPADM

## 2025-05-20 RX ORDER — THIAMINE HYDROCHLORIDE 100 MG/ML
200 INJECTION, SOLUTION INTRAMUSCULAR; INTRAVENOUS DAILY
Status: DISCONTINUED | OUTPATIENT
Start: 2025-05-20 | End: 2025-05-21 | Stop reason: HOSPADM

## 2025-05-20 RX ORDER — CHLORDIAZEPOXIDE HYDROCHLORIDE 5 MG/1
10 CAPSULE, GELATIN COATED ORAL 3 TIMES DAILY
Status: DISCONTINUED | OUTPATIENT
Start: 2025-05-20 | End: 2025-05-21 | Stop reason: HOSPADM

## 2025-05-20 RX ORDER — IBUPROFEN 200 MG
16 TABLET ORAL
Status: DISCONTINUED | OUTPATIENT
Start: 2025-05-20 | End: 2025-05-21 | Stop reason: HOSPADM

## 2025-05-20 RX ORDER — PROCHLORPERAZINE EDISYLATE 5 MG/ML
5 INJECTION INTRAMUSCULAR; INTRAVENOUS EVERY 6 HOURS PRN
Status: DISCONTINUED | OUTPATIENT
Start: 2025-05-20 | End: 2025-05-21 | Stop reason: HOSPADM

## 2025-05-20 RX ORDER — ROCURONIUM BROMIDE 10 MG/ML
INJECTION, SOLUTION INTRAVENOUS
Status: DISCONTINUED | OUTPATIENT
Start: 2025-05-20 | End: 2025-05-20

## 2025-05-20 RX ORDER — CHLORDIAZEPOXIDE HYDROCHLORIDE 25 MG/1
25 CAPSULE, GELATIN COATED ORAL 4 TIMES DAILY
Status: DISCONTINUED | OUTPATIENT
Start: 2025-05-20 | End: 2025-05-20

## 2025-05-20 RX ORDER — LEVOFLOXACIN 5 MG/ML
750 INJECTION, SOLUTION INTRAVENOUS
Status: DISCONTINUED | OUTPATIENT
Start: 2025-05-20 | End: 2025-05-21 | Stop reason: HOSPADM

## 2025-05-20 RX ORDER — IBUPROFEN 200 MG
1 TABLET ORAL DAILY
Status: DISCONTINUED | OUTPATIENT
Start: 2025-05-20 | End: 2025-05-21 | Stop reason: HOSPADM

## 2025-05-20 RX ADMIN — HYDROCODONE BITARTRATE AND ACETAMINOPHEN 1 TABLET: 5; 325 TABLET ORAL at 04:05

## 2025-05-20 RX ADMIN — HYDROMORPHONE HYDROCHLORIDE 0.2 MG: 1 INJECTION, SOLUTION INTRAMUSCULAR; INTRAVENOUS; SUBCUTANEOUS at 11:05

## 2025-05-20 RX ADMIN — DEXMEDETOMIDINE HYDROCHLORIDE 8 MCG: 100 INJECTION, SOLUTION INTRAVENOUS at 10:05

## 2025-05-20 RX ADMIN — VANCOMYCIN HYDROCHLORIDE 1250 MG: 1.25 INJECTION, POWDER, LYOPHILIZED, FOR SOLUTION INTRAVENOUS at 12:05

## 2025-05-20 RX ADMIN — FAMOTIDINE 20 MG: 10 INJECTION, SOLUTION INTRAVENOUS at 10:05

## 2025-05-20 RX ADMIN — ACETAMINOPHEN 1000 MG: 10 INJECTION, SOLUTION INTRAVENOUS at 10:05

## 2025-05-20 RX ADMIN — OXYCODONE HYDROCHLORIDE 5 MG: 5 TABLET ORAL at 11:05

## 2025-05-20 RX ADMIN — SUGAMMADEX 200 MG: 100 INJECTION, SOLUTION INTRAVENOUS at 10:05

## 2025-05-20 RX ADMIN — FENTANYL CITRATE 50 MCG: 50 INJECTION, SOLUTION INTRAMUSCULAR; INTRAVENOUS at 10:05

## 2025-05-20 RX ADMIN — TRAZODONE HYDROCHLORIDE 50 MG: 50 TABLET ORAL at 08:05

## 2025-05-20 RX ADMIN — PROPOFOL 200 MG: 10 INJECTION, EMULSION INTRAVENOUS at 10:05

## 2025-05-20 RX ADMIN — SODIUM CHLORIDE, POTASSIUM CHLORIDE, SODIUM LACTATE AND CALCIUM CHLORIDE: 600; 310; 30; 20 INJECTION, SOLUTION INTRAVENOUS at 04:05

## 2025-05-20 RX ADMIN — CIPROFLOXACIN 400 MG: 2 INJECTION, SOLUTION INTRAVENOUS at 04:05

## 2025-05-20 RX ADMIN — HYDROMORPHONE HYDROCHLORIDE 0.2 MG: 1 INJECTION, SOLUTION INTRAMUSCULAR; INTRAVENOUS; SUBCUTANEOUS at 12:05

## 2025-05-20 RX ADMIN — ATORVASTATIN CALCIUM 10 MG: 10 TABLET, FILM COATED ORAL at 08:05

## 2025-05-20 RX ADMIN — Medication 140 MG: at 10:05

## 2025-05-20 RX ADMIN — LIDOCAINE HYDROCHLORIDE 80 MG: 20 INJECTION, SOLUTION INTRAVENOUS at 10:05

## 2025-05-20 RX ADMIN — SENNOSIDES, DOCUSATE SODIUM 1 TABLET: 50; 8.6 TABLET, FILM COATED ORAL at 08:05

## 2025-05-20 RX ADMIN — SODIUM CHLORIDE, SODIUM LACTATE, POTASSIUM CHLORIDE, AND CALCIUM CHLORIDE: .6; .31; .03; .02 INJECTION, SOLUTION INTRAVENOUS at 10:05

## 2025-05-20 RX ADMIN — ONDANSETRON 4 MG: 2 INJECTION INTRAMUSCULAR; INTRAVENOUS at 10:05

## 2025-05-20 RX ADMIN — CHLORDIAZEPOXIDE HYDROCHLORIDE 10 MG: 5 CAPSULE ORAL at 02:05

## 2025-05-20 RX ADMIN — CHLORDIAZEPOXIDE HYDROCHLORIDE 10 MG: 5 CAPSULE ORAL at 08:05

## 2025-05-20 RX ADMIN — MIDAZOLAM HYDROCHLORIDE 2 MG: 1 INJECTION, SOLUTION INTRAMUSCULAR; INTRAVENOUS at 10:05

## 2025-05-20 RX ADMIN — LEVOFLOXACIN 750 MG: 5 INJECTION, SOLUTION INTRAVENOUS at 02:05

## 2025-05-20 RX ADMIN — ROCURONIUM BROMIDE 10 MG: 10 INJECTION, SOLUTION INTRAVENOUS at 10:05

## 2025-05-20 RX ADMIN — HYDROMORPHONE HYDROCHLORIDE 1 MG: 1 INJECTION, SOLUTION INTRAMUSCULAR; INTRAVENOUS; SUBCUTANEOUS at 07:05

## 2025-05-20 RX ADMIN — ROCURONIUM BROMIDE 20 MG: 10 INJECTION, SOLUTION INTRAVENOUS at 10:05

## 2025-05-20 RX ADMIN — ENOXAPARIN SODIUM 40 MG: 40 INJECTION SUBCUTANEOUS at 04:05

## 2025-05-20 NOTE — PLAN OF CARE
Hospital transfer- dc assessment completed from assessment taken yesterday at [facility]    Patient alert & oriented lives at home with her spouse. She works full time at [employer]. She is independent at home. She has been drinking at least a box of wine daily after work.She does a lcard for medical marjiuana. She uses Soraya Bales NP with [facility] for PCP, Dr Webster surgery & Dr Lunsford for plastic surgery who are both in [place]. She does have a mariangel-rectal abscess & will need to be transferred. She is aware of the plan for transfer. Will continue to follow.     [facility]  Initial Discharge Assessment       Primary Care Provider: Josefina Hartman (Inactive)    Admission Diagnosis: Perirectal abscess [K61.1]    Admission Date: 5/19/2025  Expected Discharge Date: 5/23/2025    Transition of Care Barriers: None    Payor: BLUE CROSS [facility] / Plan: [facility] / Product Type: Commercial /     Extended Emergency Contact Information  Primary Emergency Contact: JUDSON CAVAZOS JR  Home Phone: [phone]  Mobile Phone: [phone]  Relation: Spouse  Preferred language: English   needed? No    Discharge Plan A: Home with family  Discharge Plan B: Home      [facility] - [place], [place] - [address]  [address]  [facility] [address]  Phone: [phone] Fax: [phone]    [facility] [place] - [address]  [address]  [facility] [address]  Phone: [phone] Fax: [phone]      Initial Assessment (most recent)       Adult Discharge Assessment - 05/20/25 0851          Discharge Assessment    Assessment Type Discharge Planning Assessment     Confirmed/corrected address, phone number and insurance Yes     Confirmed Demographics Correct on Facesheet     Source of Information patient     Communicated SHARMIN with patient/caregiver Yes     People in Home spouse     Do you expect to return to your current living situation? Yes     Do you have help  at home or someone to help you manage your care at home? Yes     Prior to hospitilization cognitive status: Alert/Oriented     Current cognitive status: Alert/Oriented     Equipment Currently Used at Home blood pressure machine     Readmission within 30 days? No     Patient currently being followed by outpatient case management? No     Do you currently have service(s) that help you manage your care at home? No   hospital transfer    Do you take prescription medications? Yes     Do you have prescription coverage? Yes     Do you have any problems affording any of your prescribed medications? No     Is the patient taking medications as prescribed? yes     How do you get to doctors appointments? car, drives self;family or friend will provide     Are you on dialysis? No     Do you take coumadin? No     Discharge Plan A Home with family     Discharge Plan B Home     DME Needed Upon Discharge  none     Discharge Plan discussed with: Patient     Transition of Care Barriers None

## 2025-05-20 NOTE — H&P
[facility]  History & Physical    Patient Name: Kaylene Garcia  MRN: [id number]  Patient Class: IP- Inpatient  Admission Date: 5/19/2025  Attending Physician: Lulu Valadez MD  Primary Care Provider: Josefina Hartman (Inactive)         Patient information was obtained from patient and ER records.     Subjective:     Principal Problem:<principal problem not specified>    Chief Complaint:   Chief Complaint   Patient presents with    Transfer from outside facility for perirectal abscess        HPI: This is a 44-year-old   female history of severe alcohol abuse, perirectal abscess, hypertension, active tobacco abuse presenting here as a transfer from outside facility for perirectal abscess.  Patient apparently initially was hospitalized for alcohol intoxication, alcohol withdrawal syndrome, then suddenly developed perirectal abscess then transferred here.  Currently patient is status post surgical I and D of perirectal abscess,  no fever or chills, no nausea vomiting diarrhea, awake alert, no chest pain or SOB.  Patient has mild hand tremors.  Last drank was 2 days ago.  A pt of liquor every day.  Patient is told me that has a colonoscopy, this is a 3rd time she has a perirectal abscess.  She never diagnosed with diabetes, HIV.  No family history of inflammatory bowel disease.     Past Medical History:   Diagnosis Date    Alcohol abuse     Anxiety     Cancer     Hyperlipidemia     Hypertension     Migraine headache        Past Surgical History:   Procedure Laterality Date    ANOSCOPY N/A 02/12/2020    Procedure: ANOSCOPY;  Surgeon: Musa Cannon MD;  Location: [facility] OR;  Service: General;  Laterality: N/A;    BREAST BIOPSY Right 2022    INCISION AND DRAINAGE OF BUTTOCK N/A 07/14/2021    Procedure: INCISION AND DRAINAGE, BUTTOCK;  Surgeon: Jayjay Swain MD;  Location: [facility] OR;  Service: General;  Laterality: N/A;  JESSEE-RECTAL    INCISION OF PERIRECTAL ABSCESS Left  02/12/2020    Procedure: INCISION, ABSCESS, PERIRECTAL;  Surgeon: Musa Cannon MD;  Location: [facility] OR;  Service: General;  Laterality: Left;       Review of patient's allergies indicates:   Allergen Reactions    Pcn [penicillins] Anaphylaxis    Shellfish containing products Anaphylaxis    Iodine Rash    Iodine and iodide containing products Itching and Rash       Current Facility-Administered Medications on File Prior to Encounter   Medication    [DISCONTINUED] aluminum-magnesium hydroxide-simethicone 200-200-20 mg/5 mL suspension 30 mL    [DISCONTINUED] ciprofloxacin (CIPRO)400mg/200ml D5W IVPB 400 mg    [DISCONTINUED] dextrose 50% injection 12.5 g    [DISCONTINUED] dextrose 50% injection 25 g    [DISCONTINUED] diazePAM tablet 5 mg    [DISCONTINUED] famotidine tablet 20 mg    [DISCONTINUED] folic acid tablet 1 mg    [DISCONTINUED] glucagon (human recombinant) injection 1 mg    [DISCONTINUED] glucose chewable tablet 16 g    [DISCONTINUED] glucose chewable tablet 24 g    [DISCONTINUED] lactated ringers infusion    [DISCONTINUED] LORazepam (ATIVAN) injection 2 mg    [DISCONTINUED] magnesium oxide tablet 800 mg    [DISCONTINUED] magnesium oxide tablet 800 mg    [DISCONTINUED] melatonin tablet 6 mg    [DISCONTINUED] naloxone 0.4 mg/mL injection 0.02 mg    [DISCONTINUED] nicotine 21 mg/24 hr 1 patch    [DISCONTINUED] ondansetron injection 4 mg    [DISCONTINUED] polyethylene glycol packet 17 g    [DISCONTINUED] potassium bicarbonate disintegrating tablet 35 mEq    [DISCONTINUED] potassium bicarbonate disintegrating tablet 50 mEq    [DISCONTINUED] potassium bicarbonate disintegrating tablet 60 mEq    [DISCONTINUED] potassium, sodium phosphates 280-160-250 mg packet 2 packet    [DISCONTINUED] potassium, sodium phosphates 280-160-250 mg packet 2 packet    [DISCONTINUED] potassium, sodium phosphates 280-160-250 mg packet 2 packet    [DISCONTINUED] prochlorperazine injection Soln 5 mg    [DISCONTINUED] sodium chloride 0.9%  flush 10 mL    [DISCONTINUED] thiamine injection 200 mg    [DISCONTINUED] vancomycin - pharmacy to dose    [DISCONTINUED] vancomycin 1,250 mg in 0.9% NaCl 250 mL IVPB (admixture device)     Current Outpatient Medications on File Prior to Encounter   Medication Sig    atorvastatin (LIPITOR) 10 MG tablet Take 10 mg by mouth once daily.    atorvastatin (LIPITOR) 10 MG tablet Take 1 tablet by mouth every evening.    hydrOXYzine HCL (ATARAX) 25 MG tablet Take 1 tablet (25 mg total) by mouth 3 (three) times daily as needed for Itching or Anxiety.    citalopram (CELEXA) 20 MG tablet Take 20 mg by mouth.    EPINEPHrine (EPIPEN 2-MAYELIN) 0.3 mg/0.3 mL AtIn Inject 0.6 mLs (0.6 mg total) into the muscle once. for 1 dose    ibuprofen (ADVIL,MOTRIN) 600 MG tablet Take 1 tablet (600 mg total) by mouth every 6 (six) hours as needed for Pain.    montelukast (SINGULAIR) 10 mg tablet TAKE 1 TABLET BY MOUTH ONCE DAILY IN THE EVENING FOR ALLERGIES    ondansetron (ZOFRAN) 4 MG tablet Take 1 tablet (4 mg total) by mouth every 6 (six) hours.     Family History       Problem Relation (Age of Onset)    Breast cancer Mother, Paternal Grandmother          Tobacco Use    Smoking status: Every Day     Current packs/day: 1.00     Average packs/day: 1 pack/day for 20.0 years (20.0 ttl pk-yrs)     Types: Cigarettes    Smokeless tobacco: Never   Substance and Sexual Activity    Alcohol use: Yes     Alcohol/week: 50.0 standard drinks of alcohol     Types: 50 Glasses of wine per week    Drug use: Yes     Frequency: 3.0 times per week     Types: Marijuana    Sexual activity: Yes     Partners: Male     Birth control/protection: None     Review of Systems   Skin:         Perirectal abscess status post I&D, examined at bedside with female nurse chaperone.      Objective:     Vital Signs (Most Recent):  Temp: 97.8 °F (36.6 °C) (05/20/25 1224)  Pulse: 61 (05/20/25 1224)  Resp: 18 (05/20/25 1224)  BP: 137/88 (05/20/25 1224)  SpO2: (!) 94 % (05/20/25 1224)  "Vital Signs (24h Range):  Temp:  [97.8 °F (36.6 °C)-98.6 °F (37 °C)] 97.8 °F (36.6 °C)  Pulse:  [58-82] 61  Resp:  [16-22] 18  SpO2:  [94 %-100 %] 94 %  BP: (128-164)/(59-90) 137/88     Weight: 96.9 kg (213 lb 10 oz)  Body mass index is 35.55 kg/m².     Physical Exam  Constitutional:       Appearance: Normal appearance.   HENT:      Head: Normocephalic and atraumatic.      Nose: Nose normal.   Eyes:      Extraocular Movements: Extraocular movements intact.   Cardiovascular:      Rate and Rhythm: Normal rate and regular rhythm.      Heart sounds: No murmur heard.  Pulmonary:      Effort: Pulmonary effort is normal.      Breath sounds: Normal breath sounds.   Abdominal:      General: Abdomen is flat.      Palpations: Abdomen is soft.   Musculoskeletal:         General: Normal range of motion.      Cervical back: Normal range of motion and neck supple.   Skin:     Comments: See pictures   Neurological:      General: No focal deficit present.      Mental Status: She is alert and oriented to person, place, and time.                Significant Labs: All pertinent labs within the past 24 hours have been reviewed.  CBC:   Recent Labs   Lab 05/19/25  0621 05/20/25  0610   WBC 7.56 6.12   HGB 11.8* 12.1   HCT 36.0* 37.7    296     CMP:   Recent Labs   Lab 05/19/25  0621 05/20/25  0610    139   K 4.1 3.8    108   CO2 24 22*   GLU 91 97   BUN 10 9   CREATININE 0.8 0.8   CALCIUM 8.4* 8.9   PROT 5.9*  --    ALBUMIN 3.2*  --    BILITOT 0.5  --    ALKPHOS 60  --    AST 17  --    ALT 13  --    ANIONGAP 6* 9     Cardiac Markers: No results for input(s): "CKMB", "MYOGLOBIN", "BNP", "TROPISTAT" in the last 48 hours.    Significant Imaging: I have reviewed and interpreted all pertinent imaging results/findings within the past 24 hours.            Scheduled Meds:   atorvastatin  10 mg Oral QHS    chlordiazepoxide  10 mg Oral TID    folic acid  1 mg Oral Daily    levoFLOXacin  750 mg Intravenous Q24H    multivitamin  1 " tablet Oral Daily    nicotine  1 patch Transdermal Daily    senna-docusate  1 tablet Oral BID    thiamine (B-1) injection  200 mg Intravenous Daily    vancomycin (VANCOCIN) IV (PEDS and ADULTS)  1,250 mg Intravenous Q12H     Continuous Infusions:   lactated ringers   Intravenous Continuous 100 mL/hr at 05/20/25 0441 New Bag at 05/20/25 0441     PRN Meds:.  Current Facility-Administered Medications:     acetaminophen, 650 mg, Oral, Q4H PRN    aluminum-magnesium hydroxide-simethicone, 30 mL, Oral, QID PRN    dextrose 50%, 12.5 g, Intravenous, PRN    dextrose 50%, 12.5 g, Intravenous, PRN    dextrose 50%, 25 g, Intravenous, PRN    diphenhydrAMINE, 12.5 mg, Intravenous, Q15 Min PRN    glucagon (human recombinant), 1 mg, Intramuscular, PRN    glucagon (human recombinant), 1 mg, Intramuscular, PRN    glucose, 16 g, Oral, PRN    glucose, 24 g, Oral, PRN    HYDROcodone-acetaminophen, 1 tablet, Oral, Q6H PRN    HYDROmorphone, 0.2 mg, Intravenous, Q5 Min PRN    HYDROmorphone, 1 mg, Intravenous, Q4H PRN    lorazepam, 2 mg, Intravenous, Q2H PRN    magnesium oxide, 800 mg, Oral, PRN    magnesium oxide, 800 mg, Oral, PRN    naloxone, 0.02 mg, Intravenous, PRN    ondansetron, 4 mg, Intravenous, Q6H PRN    ondansetron, 4 mg, Intravenous, Daily PRN    oxyCODONE, 5 mg, Oral, Q3H PRN    potassium bicarbonate, 35 mEq, Oral, PRN    potassium bicarbonate, 50 mEq, Oral, PRN    potassium bicarbonate, 60 mEq, Oral, PRN    prochlorperazine, 5 mg, Intravenous, Q6H PRN    sodium chloride 0.9%, 10 mL, Intravenous, Q12H PRN    trazodone, 50 mg, Oral, Nightly PRN    Pharmacy to dose Vancomycin consult, , , Once **AND** vancomycin - pharmacy to dose, , Intravenous, pharmacy to manage frequency      CT Pelvis Without Contrast  Result Date: 5/18/2025  EXAMINATION: CT PELVIS WITHOUT CONTRAST CLINICAL HISTORY: Anal/rectal abscess; TECHNIQUE: Multiplanar images were obtained of the pelvis from the iliac crests through the symphysis pubis without  intravenous contrast. COMPARISON: CT of the abdomen and pelvis from 02/10/2024. FINDINGS: There is mild ill-defined soft tissue thickening in the left perianal soft tissues, extending to the medial gluteal fold (series 2, image 95), concerning for a small developing phlegmon, fistula, or abscess.  The collection measures approximately 2.0 x 1.0 cm.  No evidence of soft tissue gas.  There is mild adjacent subcutaneous soft tissue stranding in the left medial gluteal fold.  The right medial gluteal fold and perianal soft tissues are unremarkable.  There is colonic diverticulosis without evidence of acute diverticulitis.  The appendix is in the left lower quadrant and is unremarkable.  Partially visualized loops of small and large bowel are otherwise unremarkable.  The uterus and adnexae are unremarkable.  The urinary bladder is unremarkable.  Osseous structures are intact.  Muscle bulk is maintained.  The partially visualized body wall is unremarkable.     Left perianal phlegmon or early abscess as above.  Fistula not excluded. Electronically signed by: Veto Olmedo Date:    05/18/2025 Time:    20:43  - pulls last radiology orders    Assessment/Plan:     Assessment & Plan  Perirectal abscess    Recurrent perirectal abscess.  Status post surgical I and D right now.   Continue empiric IV vancomycin and Levaquin.  IV hydration.   Patient has never has a colonoscopy- patient will need one as outpatient to rule out underlying IBD.   Check HIV/ HCV/HBV/ A1c.  Urine drug test.    Alcohol withdrawal syndrome    Cut down dose of Librium.  Folic acid and thiamine  IV hydration  Tobacco dependency  Dangers of cigarette smoking were reviewed with patient in detail. Patient was Counseled for 10 minutes or greater. Nicotine replacement options were discussed. Nicotine replacement was discussed- prescribed  Acute cystitis    Continue empiric IV Levaquin  Alcohol intoxication    Resolved  VTE Risk Mitigation (From admission, onward)            Ordered     IP VTE HIGH RISK PATIENT  Once         05/20/25 0314     Place sequential compression device  Until discontinued         05/20/25 0314                               Pharmacokinetic Assessment Follow Up: IV Vancomycin    Vancomycin serum concentration assessment(s):    The trough level was drawn correctly and can be used to guide therapy at this time. The measurement is within the desired definitive target range of 10 to 15 mcg/mL.    Vancomycin Regimen Plan:    Continue regimen to Vancomycin 1250  mg IV every 12 hours with next serum trough concentration measured at 1930 prior to 4th dose on 5/21    Drug levels (last 3 results):  Recent Labs   Lab Result Units 05/20/25  0610   Vancomycin Trough ug/ml 10.3       Pharmacy will continue to follow and monitor vancomycin.    Please contact pharmacy at extension [phone] for questions regarding this assessment.    Thank you for the consult,   Sheng Peters       Patient brief summary:  Kaylene Garcia is a 44 y.o. female initiated on antimicrobial therapy with IV Vancomycin for treatment of skin & soft tissue infection    The patient's current regimen is vancomycin 1250 mg     Drug Allergies:   Review of patient's allergies indicates:   Allergen Reactions    Pcn [penicillins] Anaphylaxis    Shellfish containing products Anaphylaxis    Iodine Rash    Iodine and iodide containing products Itching and Rash       Actual Body Weight:   96.9 kg    Renal Function:   Estimated Creatinine Clearance: 103.4 mL/min (based on SCr of 0.8 mg/dL).,     Dialysis Method (if applicable):  N/A    CBC (last 72 hours):  Recent Labs   Lab Result Units 05/18/25  1120 05/19/25  0621 05/20/25  0610   WBC K/uL 15.10* 7.56 6.12   Hgb gm/dL  --   --  12.1   HGB gm/dL 12.8 11.8*  --    Hct %  --   --  37.7   HCT % 37.9 36.0*  --    Platelet Count K/uL 402 316 296   Lymph % % 19.8 26.3  --    Mono % % 7.4 12.3 12.3   Eos % % 1.9 3.0 3.4   Basophil % % 0.7 1.1 1.0       Metabolic Panel  (last 72 hours):  Recent Labs   Lab Result Units 05/18/25  1120 05/18/25  1133 05/19/25  0621 05/20/25  0610   Sodium mmol/L 139  --  138 139   Potassium mmol/L 3.8  --  4.1 3.8   Chloride mmol/L 108  --  108 108   CO2 mmol/L 18*  --  24 22*   Glucose mg/dL 91  --  91 97   Glucose, UA   --  Negative  --   --    BUN mg/dL 12  --  10 9   Creatinine mg/dL 0.8  --  0.8 0.8   Urine Creatinine mg/dL  --  24.2  --   --    Albumin g/dL 3.9  --  3.2*  --    Bilirubin Total mg/dL 0.1  --  0.5  --    ALP unit/L 72  --  60  --    AST unit/L 22  --  17  --    ALT unit/L 19  --  13  --    Magnesium  mg/dL  --   --  1.6  --    Magnesium mg/dL  --   --   --  1.8   Phosphorus Level mg/dL  --   --  3.2 3.5       Vancomycin Administrations:  vancomycin given in the last 96 hours                     vancomycin 1,250 mg in 0.9% NaCl 250 mL IVPB (admixture device) (mg) 1,250 mg New Bag 05/19/25 1708      Restarted  0549     1,250 mg New Bag  0544     1,250 mg New Bag 05/18/25 1804                    Microbiologic Results:  Microbiology Results (last 7 days)       ** No results found for the last 168 hours. **            Lulu Valadez MD  Department of Hospital Medicine  [facility]

## 2025-05-20 NOTE — OP NOTE
Surgery Date: 5/20/2025     Surgeons and Role:     * Urban Rizzo III, MD - Primary    Assisting Surgeon: None    Pre-op Diagnosis:  Perirectal abscess [K61.1]    Post-op Diagnosis:  Post-Op Diagnosis Codes:     * Perirectal abscess [K61.1]    Procedure(s) (LRB):  Exam under anesthesia (Left)  Incision and drainage of perirectal abscess    Anesthesia: General    Implants:  * No implants in log *    Operative Findings: small abscess left perirectal tissue. Small amount of pus. Cavity opened. Fluid cultured. Cavity packed with a quarter inch penrose.     Patient brought to the OR and transferred to the OR table in the supine position. She was given general anesthesia and intubated. She was put in the lithotomy position. Mariangel rectum prepped and draped. The abscess located at a previous site in the left mariangel rectum. Incision made into the previous incision. Entered into a cavity. Small amount of pus. It was cultured. Cavity irrigated. I placed a quarter inch penrose. It was sutured to the skin. Wound bandaged. She was put back in the supine position. She was extubated and brought to the RR in stable condition.     Estimated Blood Loss: 5 mL  Complications none   Estimated Blood Loss has been documented.       Instrument counts correct  Specimens:   Specimen (24h ago, onward)      None          * No specimens in log *    [id number]

## 2025-05-20 NOTE — ANESTHESIA PROCEDURE NOTES
Intubation    Date/Time: 5/20/2025 10:29 AM    Performed by: Cherry Chauhan CRNA  Authorized by: Jose L Sheth Jr., MD    Intubation:     Induction:  Intravenous    Intubated:  Postinduction    Mask Ventilation:  Easy mask    Attempts:  1    Attempted By:  CRNA    Method of Intubation:  Video laryngoscopy    Blade:  Crisostomo 3    Laryngeal View Grade: Grade I - full view of cords      Difficult Airway Encountered?: No      Complications:  None    Airway Device:  Oral endotracheal tube    Airway Device Size:  7.5    Style/Cuff Inflation:  Cuffed    Tube secured:  22    Secured at:  The lips    Placement Verified By:  Capnometry and Revisualization with laryngoscopy    Complicating Factors:  None    Findings Post-Intubation:  BS equal bilateral and atraumatic/condition of teeth unchanged

## 2025-05-20 NOTE — PROGRESS NOTES
Pharmacokinetic Assessment Follow Up: IV Vancomycin    Vancomycin serum concentration assessment(s):    The trough level was drawn correctly and can be used to guide therapy at this time. The measurement is within the desired definitive target range of 10 to 15 mcg/mL.    Vancomycin Regimen Plan:    Continue regimen to Vancomycin 1250  mg IV every 12 hours with next serum trough concentration measured at 1930 prior to 4th dose on 5/21    Drug levels (last 3 results):  Recent Labs   Lab Result Units 05/20/25  0610   Vancomycin Trough ug/ml 10.3       Pharmacy will continue to follow and monitor vancomycin.    Please contact pharmacy at extension [phone] for questions regarding this assessment.    Thank you for the consult,   Sheng Peters       Patient brief summary:  Kaylene Garcia is a 44 y.o. female initiated on antimicrobial therapy with IV Vancomycin for treatment of skin & soft tissue infection    The patient's current regimen is vancomycin 1250 mg     Drug Allergies:   Review of patient's allergies indicates:   Allergen Reactions    Pcn [penicillins] Anaphylaxis    Shellfish containing products Anaphylaxis    Iodine Rash    Iodine and iodide containing products Itching and Rash       Actual Body Weight:   96.9 kg    Renal Function:   Estimated Creatinine Clearance: 103.4 mL/min (based on SCr of 0.8 mg/dL).,     Dialysis Method (if applicable):  N/A    CBC (last 72 hours):  Recent Labs   Lab Result Units 05/18/25  1120 05/19/25  0621 05/20/25  0610   WBC K/uL 15.10* 7.56 6.12   Hgb gm/dL  --   --  12.1   HGB gm/dL 12.8 11.8*  --    Hct %  --   --  37.7   HCT % 37.9 36.0*  --    Platelet Count K/uL 402 316 296   Lymph % % 19.8 26.3  --    Mono % % 7.4 12.3 12.3   Eos % % 1.9 3.0 3.4   Basophil % % 0.7 1.1 1.0       Metabolic Panel (last 72 hours):  Recent Labs   Lab Result Units 05/18/25  1120 05/18/25  1133 05/19/25  0621 05/20/25  0610   Sodium mmol/L 139  --  138 139   Potassium mmol/L 3.8  --  4.1 3.8   Chloride  mmol/L 108  --  108 108   CO2 mmol/L 18*  --  24 22*   Glucose mg/dL 91  --  91 97   Glucose, UA   --  Negative  --   --    BUN mg/dL 12  --  10 9   Creatinine mg/dL 0.8  --  0.8 0.8   Urine Creatinine mg/dL  --  24.2  --   --    Albumin g/dL 3.9  --  3.2*  --    Bilirubin Total mg/dL 0.1  --  0.5  --    ALP unit/L 72  --  60  --    AST unit/L 22  --  17  --    ALT unit/L 19  --  13  --    Magnesium  mg/dL  --   --  1.6  --    Magnesium mg/dL  --   --   --  1.8   Phosphorus Level mg/dL  --   --  3.2 3.5       Vancomycin Administrations:  vancomycin given in the last 96 hours                     vancomycin 1,250 mg in 0.9% NaCl 250 mL IVPB (admixture device) (mg) 1,250 mg New Bag 05/19/25 1708      Restarted  0549     1,250 mg New Bag  0544     1,250 mg New Bag 05/18/25 1804                    Microbiologic Results:  Microbiology Results (last 7 days)       ** No results found for the last 168 hours. **

## 2025-05-20 NOTE — CARE UPDATE
Patient seen and examined. Agree with previous hospital medicine documentation. Patient denies any complaints at this time however she request for sleep medication. Vital signs stable and no acute distress.  New orders per chart. Patient states her perirectal abscess keeps reoccurring even after previous antibiotics. She also states she is scheduled for a right mastectomy in June for prophylactic measures. Urine culture noted with > 100,000 g negative rods continue with current antibiotic regimen.  No perirectal drainage noticed at this time.  Plan of care updated with patient and verbalized understanding.

## 2025-05-20 NOTE — HPI
This is a 44-year-old   female history of severe alcohol abuse, perirectal abscess, hypertension, active tobacco abuse presenting here as a transfer from outside facility for perirectal abscess.  Patient apparently initially was hospitalized for alcohol intoxication, alcohol withdrawal syndrome, then suddenly developed perirectal abscess then transferred here.  Currently patient is status post surgical I and D of perirectal abscess,  no fever or chills, no nausea vomiting diarrhea, awake alert, no chest pain or SOB.  Patient has mild hand tremors.  Last drank was 2 days ago.  A pt of liquor every day.  Patient is told me that has a colonoscopy, this is a 3rd time she has a perirectal abscess.  She never diagnosed with diabetes, HIV.  No family history of inflammatory bowel disease.

## 2025-05-20 NOTE — ASSESSMENT & PLAN NOTE
Recurrent perirectal abscess.  Status post surgical I and D right now.   Continue empiric IV vancomycin and Levaquin.  IV hydration.   Patient has never has a colonoscopy- patient will need one as outpatient to rule out underlying IBD.   Check HIV/ HCV/HBV/ A1c.  Urine drug test.

## 2025-05-20 NOTE — CONSULTS
[facility]  General Surgery  Consult Note    Inpatient consult to General Surgery  Consult performed by: Urban Rizzo III, MD  Consult ordered by: Kallie Diaz DNP  Reason for consult: recurrent perirectal abscess        Subjective:     Chief Complaint/Reason for Admission: recurrent perirectal abscess. Admitted with acute alcohol intoxication and subsequent withdrawal.     History of Present Illness: 44 year old female admitted to an outside facility with alcohol intoxication and then withdrawal. She had complaint of recurrent perirectal pain and drainage. CT pelvis showed 2 cm mariangel anal abscess. She was transferred to [facility] for further treatment. She no longer has any signs of withdrawal. She reports she has had several recurrent abscesses in this location over the past five years. It has been surgically drained three times prior. She reports no fever or chills. It drained purulent fluid over the past week. No drainage over last 48 hours. She reports some bloody drainage at home in between episodes. Has never noticed obvious feculent drainage. She has no known inflammatory bowel disease.   She reports recent diagnosis of early stage breast cancer. Surgery scheduled for July.     Current Facility-Administered Medications on File Prior to Encounter   Medication    [DISCONTINUED] aluminum-magnesium hydroxide-simethicone 200-200-20 mg/5 mL suspension 30 mL    [DISCONTINUED] ciprofloxacin (CIPRO)400mg/200ml D5W IVPB 400 mg    [DISCONTINUED] dextrose 50% injection 12.5 g    [DISCONTINUED] dextrose 50% injection 25 g    [DISCONTINUED] diazePAM tablet 5 mg    [DISCONTINUED] famotidine tablet 20 mg    [DISCONTINUED] folic acid tablet 1 mg    [DISCONTINUED] glucagon (human recombinant) injection 1 mg    [DISCONTINUED] glucose chewable tablet 16 g    [DISCONTINUED] glucose chewable tablet 24 g    [DISCONTINUED] lactated ringers infusion    [DISCONTINUED] LORazepam (ATIVAN) injection 2 mg     [DISCONTINUED] magnesium oxide tablet 800 mg    [DISCONTINUED] magnesium oxide tablet 800 mg    [DISCONTINUED] melatonin tablet 6 mg    [DISCONTINUED] naloxone 0.4 mg/mL injection 0.02 mg    [DISCONTINUED] nicotine 21 mg/24 hr 1 patch    [DISCONTINUED] ondansetron injection 4 mg    [DISCONTINUED] polyethylene glycol packet 17 g    [DISCONTINUED] potassium bicarbonate disintegrating tablet 35 mEq    [DISCONTINUED] potassium bicarbonate disintegrating tablet 50 mEq    [DISCONTINUED] potassium bicarbonate disintegrating tablet 60 mEq    [DISCONTINUED] potassium, sodium phosphates 280-160-250 mg packet 2 packet    [DISCONTINUED] potassium, sodium phosphates 280-160-250 mg packet 2 packet    [DISCONTINUED] potassium, sodium phosphates 280-160-250 mg packet 2 packet    [DISCONTINUED] prochlorperazine injection Soln 5 mg    [DISCONTINUED] sodium chloride 0.9% flush 10 mL    [DISCONTINUED] thiamine injection 200 mg    [DISCONTINUED] vancomycin - pharmacy to dose    [DISCONTINUED] vancomycin 1,250 mg in 0.9% NaCl 250 mL IVPB (admixture device)     Current Outpatient Medications on File Prior to Encounter   Medication Sig    atorvastatin (LIPITOR) 10 MG tablet Take 10 mg by mouth once daily.    atorvastatin (LIPITOR) 10 MG tablet Take 1 tablet by mouth every evening.    hydrOXYzine HCL (ATARAX) 25 MG tablet Take 1 tablet (25 mg total) by mouth 3 (three) times daily as needed for Itching or Anxiety.    citalopram (CELEXA) 20 MG tablet Take 20 mg by mouth.    EPINEPHrine (EPIPEN 2-MAYELIN) 0.3 mg/0.3 mL AtIn Inject 0.6 mLs (0.6 mg total) into the muscle once. for 1 dose    ibuprofen (ADVIL,MOTRIN) 600 MG tablet Take 1 tablet (600 mg total) by mouth every 6 (six) hours as needed for Pain.    montelukast (SINGULAIR) 10 mg tablet TAKE 1 TABLET BY MOUTH ONCE DAILY IN THE EVENING FOR ALLERGIES    ondansetron (ZOFRAN) 4 MG tablet Take 1 tablet (4 mg total) by mouth every 6 (six) hours.       Review of patient's allergies indicates:    Allergen Reactions    Pcn [penicillins] Anaphylaxis    Shellfish containing products Anaphylaxis    Iodine Rash    Iodine and iodide containing products Itching and Rash       Past Medical History:   Diagnosis Date    Alcohol abuse     Anxiety     Cancer     Hyperlipidemia     Hypertension     Migraine headache      Past Surgical History:   Procedure Laterality Date    ANOSCOPY N/A 02/12/2020    Procedure: ANOSCOPY;  Surgeon: Musa Cannon MD;  Location: [facility] OR;  Service: General;  Laterality: N/A;    BREAST BIOPSY Right 2022    INCISION AND DRAINAGE OF BUTTOCK N/A 07/14/2021    Procedure: INCISION AND DRAINAGE, BUTTOCK;  Surgeon: Jayjay Swain MD;  Location: [facility] OR;  Service: General;  Laterality: N/A;  JESSEE-RECTAL    INCISION OF PERIRECTAL ABSCESS Left 02/12/2020    Procedure: INCISION, ABSCESS, PERIRECTAL;  Surgeon: Musa Cannon MD;  Location: [facility] OR;  Service: General;  Laterality: Left;     Family History       Problem Relation (Age of Onset)    Breast cancer Mother, Paternal Grandmother          Tobacco Use    Smoking status: Every Day     Current packs/day: 1.00     Average packs/day: 1 pack/day for 20.0 years (20.0 ttl pk-yrs)     Types: Cigarettes    Smokeless tobacco: Never   Substance and Sexual Activity    Alcohol use: Yes     Alcohol/week: 50.0 standard drinks of alcohol     Types: 50 Glasses of wine per week    Drug use: Yes     Frequency: 3.0 times per week     Types: Marijuana    Sexual activity: Yes     Partners: Male     Birth control/protection: None     Review of Systems   Constitutional:  Negative for appetite change, chills, fever and unexpected weight change.   HENT:  Negative for hearing loss, rhinorrhea, sore throat and voice change.    Eyes:  Negative for photophobia and visual disturbance.   Respiratory:  Negative for cough, choking and shortness of breath.    Cardiovascular:  Negative for chest pain, palpitations and leg swelling.   Gastrointestinal:  Positive for  rectal pain. Negative for abdominal pain, blood in stool, constipation, diarrhea, nausea and vomiting.   Endocrine: Negative for cold intolerance, heat intolerance, polydipsia and polyuria.   Musculoskeletal:  Negative for arthralgias, back pain, joint swelling and neck stiffness.   Skin:  Negative for color change, pallor and rash.   Neurological:  Negative for dizziness, seizures, syncope and headaches.   Hematological:  Negative for adenopathy. Does not bruise/bleed easily.   Psychiatric/Behavioral:  Negative for agitation, behavioral problems and confusion.      Objective:     Vital Signs (Most Recent):  Temp: 97.8 °F (36.6 °C) (05/20/25 1224)  Pulse: 61 (05/20/25 1224)  Resp: 18 (05/20/25 1224)  BP: 137/88 (05/20/25 1224)  SpO2: (!) 94 % (05/20/25 1224) Vital Signs (24h Range):  Temp:  [97.8 °F (36.6 °C)-98.6 °F (37 °C)] 97.8 °F (36.6 °C)  Pulse:  [58-82] 61  Resp:  [16-22] 18  SpO2:  [94 %-100 %] 94 %  BP: (128-164)/(59-90) 137/88     Weight: 96.9 kg (213 lb 10 oz)  Body mass index is 35.55 kg/m².      Intake/Output Summary (Last 24 hours) at 5/20/2025 1405  Last data filed at 5/20/2025 1113  Gross per 24 hour   Intake 600 ml   Output --   Net 600 ml       Physical Exam  Constitutional:       General: She is not in acute distress.     Appearance: She is not toxic-appearing.   Pulmonary:      Effort: No tachypnea or bradypnea.   Genitourinary:         Comments: Small area of tenderness and induration underlying her previous scar in the left medial buttock. Can express small amount of pus from small sinus in the scar.   Neurological:      Mental Status: She is alert and oriented to person, place, and time.   Psychiatric:         Behavior: Behavior is cooperative.         Significant Labs:  CBC:   Recent Labs   Lab 05/20/25  0610   WBC 6.12   RBC 3.85*   HGB 12.1   HCT 37.7      MCV 98   MCH 31.4*   MCHC 32.1     CMP:   Recent Labs   Lab 05/19/25  0621 05/20/25  0610   GLU 91 97   CALCIUM 8.4* 8.9    ALBUMIN 3.2*  --    PROT 5.9*  --     139   K 4.1 3.8   CO2 24 22*    108   BUN 10 9   CREATININE 0.8 0.8   ALKPHOS 60  --    ALT 13  --    AST 17  --    BILITOT 0.5  --        Significant Diagnostics:  I have reviewed all pertinent imaging results/findings within the past 24 hours.    Assessment/Plan:     Active Diagnoses:    Diagnosis Date Noted POA    Alcohol intoxication [F10.929] 05/20/2025 Unknown    Acute cystitis [N30.00] 05/19/2025 Yes    Alcohol withdrawal syndrome [F10.939] 05/18/2025 Yes    Tobacco dependency [F17.200] 05/18/2025 Yes    Perirectal abscess [K61.1] 02/12/2020 Yes      Problems Resolved During this Admission:     -plan for I an D in the OR today. Risks and benefits of the operation were discussed.   Thank you for your consult.     Urban Rizzo III, MD  General Surgery  [facility]

## 2025-05-20 NOTE — PLAN OF CARE
[facility]  Discharge Final Note    Primary Care Provider: Josefina Hartman (Inactive)    Expected Discharge Date: 5/19/2025    Patient waiting on bed at [facility] for upgrade in care. Plan will be to return home at discharge if she doesn't want to go anywhere for her substance abuse.     Final Discharge Note (most recent)       Final Note - 05/20/25 0711          Final Note    Assessment Type Final Discharge Note     Anticipated Discharge Disposition Short Term Hospital                     Important Message from Medicare

## 2025-05-20 NOTE — DISCHARGE SUMMARY
[facility] Medicine  Discharge Summary      Patient Name: Kaylene Garcia  MRN: [id number]  POLLO: [id number]  Patient Class: IP- Inpatient  Admission Date: 5/18/2025  Hospital Length of Stay: 1 days  Discharge Date and Time: 5/19/2025 10:50 PM  Attending Physician: No att. providers found   Discharging Provider: Rodrigo Haney MD  Primary Care Provider: Josefina Hartman (Inactive)    Primary Care Team: Networked reference to record PCT     HPI:   43 yo w/ hx of ETOH abuse, HTN, HLD, Breast cancer presenting with symptoms of ETOH withdrawal. Last drink this am. Drank very heavily this weekend. She has been drinking a box of wine daily for past few months since she relapsed. She had been sober for 4 months prior to this. She states she was diagnosed with breast cancer and this caused her to relapse. No hx of withdrawal requiring admission. She states she might have had withdrawal seizure last night but was still very intoxicated. No fever, chills, vomiting, or diarrhea. No abdominal pain. No dysuria. Endorses nausea.    * No surgery found *      Hospital Course:   Patient admitted for acute ETOH withdrawal. Also found to have acute cystitis. On abx. Evening after admission patient disclosed that she has hx of perirectal abscess that has recurred. On exam I noted likely abscess next to scar tissue from previous surgery with mild drainage. CT shows abscess likely abscess formation vs fistula. We do not have general surgery coverage this week. Patient will need transfer for general surgery consult and definitive management of abscess.      Goals of Care Treatment Preferences:  Code Status: Full Code         Consults:   Consults (From admission, onward)          Status Ordering Provider     Inpatient consult to Social Work/Case Management  Once        Provider:  (Not yet assigned)    Completed RAINA BROOKS            Assessment & Plan  Alcohol withdrawal syndrome with  complication  Patient having mild withdrawal symptoms with positive ETOH  Possible withdrawal seizure last night but this is unlikely as she was still heavily intoxicated at that time  Start valium taper  PRN ativan with CIWA q4h  Thiamine and folic acid  Pepcid BID  IVF  Counseled on cessation    Anxiety  Continue home medication    Mixed hyperlipidemia  Hold statin for now    Tobacco dependency  Dangers of cigarette smoking were reviewed with patient in detail. Patient was Counseled for 3-10 minutes. Nicotine replacement options were discussed. Nicotine replacement was discussed- prescribed  Acute cystitis  IV abx  F/U UCx    Perianal abscess  IV abx  F/U wound culture  Transfer process initiated for general surgery consult    Final Active Diagnoses:    Diagnosis Date Noted POA    PRINCIPAL PROBLEM:  Alcohol withdrawal syndrome with complication [F10.939] 05/18/2025 Yes    Acute cystitis [N30.00] 05/19/2025 Yes    Perianal abscess [K61.0] 05/19/2025 Yes    Tobacco dependency [F17.200] 05/18/2025 Yes    Anxiety [F41.9] 05/25/2019 Yes    Mixed hyperlipidemia [E78.2] 05/25/2019 Yes      Problems Resolved During this Admission:       Discharged Condition: stable    Disposition: Planned Readmission - Di*    Follow Up:    Patient Instructions:   No discharge procedures on file.    Significant Diagnostic Studies: N/A    Pending Diagnostic Studies:       None           Medications:  None    Indwelling Lines/Drains at time of discharge:   Lines/Drains/Airways       None                   Time spent on the discharge of patient: 15 minutes         Rodrigo Haney MD  Department of Hospital Medicine  [facility]

## 2025-05-20 NOTE — SUBJECTIVE & OBJECTIVE
Past Medical History:   Diagnosis Date    Alcohol abuse     Anxiety     Cancer     Hyperlipidemia     Hypertension     Migraine headache        Past Surgical History:   Procedure Laterality Date    ANOSCOPY N/A 02/12/2020    Procedure: ANOSCOPY;  Surgeon: Musa Cannon MD;  Location: [facility] OR;  Service: General;  Laterality: N/A;    BREAST BIOPSY Right 2022    INCISION AND DRAINAGE OF BUTTOCK N/A 07/14/2021    Procedure: INCISION AND DRAINAGE, BUTTOCK;  Surgeon: Jayjay Swain MD;  Location: [facility] OR;  Service: General;  Laterality: N/A;  JESSEE-RECTAL    INCISION OF PERIRECTAL ABSCESS Left 02/12/2020    Procedure: INCISION, ABSCESS, PERIRECTAL;  Surgeon: Musa Cannon MD;  Location: [facility] OR;  Service: General;  Laterality: Left;       Review of patient's allergies indicates:   Allergen Reactions    Pcn [penicillins] Anaphylaxis    Shellfish containing products Anaphylaxis    Iodine Rash    Iodine and iodide containing products Itching and Rash       Current Facility-Administered Medications on File Prior to Encounter   Medication    [DISCONTINUED] aluminum-magnesium hydroxide-simethicone 200-200-20 mg/5 mL suspension 30 mL    [DISCONTINUED] ciprofloxacin (CIPRO)400mg/200ml D5W IVPB 400 mg    [DISCONTINUED] dextrose 50% injection 12.5 g    [DISCONTINUED] dextrose 50% injection 25 g    [DISCONTINUED] diazePAM tablet 5 mg    [DISCONTINUED] famotidine tablet 20 mg    [DISCONTINUED] folic acid tablet 1 mg    [DISCONTINUED] glucagon (human recombinant) injection 1 mg    [DISCONTINUED] glucose chewable tablet 16 g    [DISCONTINUED] glucose chewable tablet 24 g    [DISCONTINUED] lactated ringers infusion    [DISCONTINUED] LORazepam (ATIVAN) injection 2 mg    [DISCONTINUED] magnesium oxide tablet 800 mg    [DISCONTINUED] magnesium oxide tablet 800 mg    [DISCONTINUED] melatonin tablet 6 mg    [DISCONTINUED] naloxone 0.4 mg/mL injection 0.02 mg    [DISCONTINUED] nicotine 21 mg/24 hr 1 patch    [DISCONTINUED]  ondansetron injection 4 mg    [DISCONTINUED] polyethylene glycol packet 17 g    [DISCONTINUED] potassium bicarbonate disintegrating tablet 35 mEq    [DISCONTINUED] potassium bicarbonate disintegrating tablet 50 mEq    [DISCONTINUED] potassium bicarbonate disintegrating tablet 60 mEq    [DISCONTINUED] potassium, sodium phosphates 280-160-250 mg packet 2 packet    [DISCONTINUED] potassium, sodium phosphates 280-160-250 mg packet 2 packet    [DISCONTINUED] potassium, sodium phosphates 280-160-250 mg packet 2 packet    [DISCONTINUED] prochlorperazine injection Soln 5 mg    [DISCONTINUED] sodium chloride 0.9% flush 10 mL    [DISCONTINUED] thiamine injection 200 mg    [DISCONTINUED] vancomycin - pharmacy to dose    [DISCONTINUED] vancomycin 1,250 mg in 0.9% NaCl 250 mL IVPB (admixture device)     Current Outpatient Medications on File Prior to Encounter   Medication Sig    atorvastatin (LIPITOR) 10 MG tablet Take 10 mg by mouth once daily.    atorvastatin (LIPITOR) 10 MG tablet Take 1 tablet by mouth every evening.    hydrOXYzine HCL (ATARAX) 25 MG tablet Take 1 tablet (25 mg total) by mouth 3 (three) times daily as needed for Itching or Anxiety.    citalopram (CELEXA) 20 MG tablet Take 20 mg by mouth.    EPINEPHrine (EPIPEN 2-MAYELIN) 0.3 mg/0.3 mL AtIn Inject 0.6 mLs (0.6 mg total) into the muscle once. for 1 dose    ibuprofen (ADVIL,MOTRIN) 600 MG tablet Take 1 tablet (600 mg total) by mouth every 6 (six) hours as needed for Pain.    montelukast (SINGULAIR) 10 mg tablet TAKE 1 TABLET BY MOUTH ONCE DAILY IN THE EVENING FOR ALLERGIES    ondansetron (ZOFRAN) 4 MG tablet Take 1 tablet (4 mg total) by mouth every 6 (six) hours.     Family History       Problem Relation (Age of Onset)    Breast cancer Mother, Paternal Grandmother          Tobacco Use    Smoking status: Every Day     Current packs/day: 1.00     Average packs/day: 1 pack/day for 20.0 years (20.0 ttl pk-yrs)     Types: Cigarettes    Smokeless tobacco: Never    Substance and Sexual Activity    Alcohol use: Yes     Alcohol/week: 50.0 standard drinks of alcohol     Types: 50 Glasses of wine per week    Drug use: Yes     Frequency: 3.0 times per week     Types: Marijuana    Sexual activity: Yes     Partners: Male     Birth control/protection: None     Review of Systems   Skin:         Perirectal abscess status post I&D, examined at bedside with female nurse chaperone.      Objective:     Vital Signs (Most Recent):  Temp: 97.8 °F (36.6 °C) (05/20/25 1224)  Pulse: 61 (05/20/25 1224)  Resp: 18 (05/20/25 1224)  BP: 137/88 (05/20/25 1224)  SpO2: (!) 94 % (05/20/25 1224) Vital Signs (24h Range):  Temp:  [97.8 °F (36.6 °C)-98.6 °F (37 °C)] 97.8 °F (36.6 °C)  Pulse:  [58-82] 61  Resp:  [16-22] 18  SpO2:  [94 %-100 %] 94 %  BP: (128-164)/(59-90) 137/88     Weight: 96.9 kg (213 lb 10 oz)  Body mass index is 35.55 kg/m².     Physical Exam  Constitutional:       Appearance: Normal appearance.   HENT:      Head: Normocephalic and atraumatic.      Nose: Nose normal.   Eyes:      Extraocular Movements: Extraocular movements intact.   Cardiovascular:      Rate and Rhythm: Normal rate and regular rhythm.      Heart sounds: No murmur heard.  Pulmonary:      Effort: Pulmonary effort is normal.      Breath sounds: Normal breath sounds.   Abdominal:      General: Abdomen is flat.      Palpations: Abdomen is soft.   Musculoskeletal:         General: Normal range of motion.      Cervical back: Normal range of motion and neck supple.   Skin:     Comments: See pictures   Neurological:      General: No focal deficit present.      Mental Status: She is alert and oriented to person, place, and time.                Significant Labs: All pertinent labs within the past 24 hours have been reviewed.  CBC:   Recent Labs   Lab 05/19/25  0621 05/20/25  0610   WBC 7.56 6.12   HGB 11.8* 12.1   HCT 36.0* 37.7    296     CMP:   Recent Labs   Lab 05/19/25  0621 05/20/25  0610    139   K 4.1 3.8   CL  "108 108   CO2 24 22*   GLU 91 97   BUN 10 9   CREATININE 0.8 0.8   CALCIUM 8.4* 8.9   PROT 5.9*  --    ALBUMIN 3.2*  --    BILITOT 0.5  --    ALKPHOS 60  --    AST 17  --    ALT 13  --    ANIONGAP 6* 9     Cardiac Markers: No results for input(s): "CKMB", "MYOGLOBIN", "BNP", "TROPISTAT" in the last 48 hours.    Significant Imaging: I have reviewed and interpreted all pertinent imaging results/findings within the past 24 hours.            Scheduled Meds:   atorvastatin  10 mg Oral QHS    chlordiazepoxide  10 mg Oral TID    folic acid  1 mg Oral Daily    levoFLOXacin  750 mg Intravenous Q24H    multivitamin  1 tablet Oral Daily    nicotine  1 patch Transdermal Daily    senna-docusate  1 tablet Oral BID    thiamine (B-1) injection  200 mg Intravenous Daily    vancomycin (VANCOCIN) IV (PEDS and ADULTS)  1,250 mg Intravenous Q12H     Continuous Infusions:   lactated ringers   Intravenous Continuous 100 mL/hr at 05/20/25 0441 New Bag at 05/20/25 0441     PRN Meds:.  Current Facility-Administered Medications:     acetaminophen, 650 mg, Oral, Q4H PRN    aluminum-magnesium hydroxide-simethicone, 30 mL, Oral, QID PRN    dextrose 50%, 12.5 g, Intravenous, PRN    dextrose 50%, 12.5 g, Intravenous, PRN    dextrose 50%, 25 g, Intravenous, PRN    diphenhydrAMINE, 12.5 mg, Intravenous, Q15 Min PRN    glucagon (human recombinant), 1 mg, Intramuscular, PRN    glucagon (human recombinant), 1 mg, Intramuscular, PRN    glucose, 16 g, Oral, PRN    glucose, 24 g, Oral, PRN    HYDROcodone-acetaminophen, 1 tablet, Oral, Q6H PRN    HYDROmorphone, 0.2 mg, Intravenous, Q5 Min PRN    HYDROmorphone, 1 mg, Intravenous, Q4H PRN    lorazepam, 2 mg, Intravenous, Q2H PRN    magnesium oxide, 800 mg, Oral, PRN    magnesium oxide, 800 mg, Oral, PRN    naloxone, 0.02 mg, Intravenous, PRN    ondansetron, 4 mg, Intravenous, Q6H PRN    ondansetron, 4 mg, Intravenous, Daily PRN    oxyCODONE, 5 mg, Oral, Q3H PRN    potassium bicarbonate, 35 mEq, Oral, PRN    " potassium bicarbonate, 50 mEq, Oral, PRN    potassium bicarbonate, 60 mEq, Oral, PRN    prochlorperazine, 5 mg, Intravenous, Q6H PRN    sodium chloride 0.9%, 10 mL, Intravenous, Q12H PRN    trazodone, 50 mg, Oral, Nightly PRN    Pharmacy to dose Vancomycin consult, , , Once **AND** vancomycin - pharmacy to dose, , Intravenous, pharmacy to manage frequency      CT Pelvis Without Contrast  Result Date: 5/18/2025  EXAMINATION: CT PELVIS WITHOUT CONTRAST CLINICAL HISTORY: Anal/rectal abscess; TECHNIQUE: Multiplanar images were obtained of the pelvis from the iliac crests through the symphysis pubis without intravenous contrast. COMPARISON: CT of the abdomen and pelvis from 02/10/2024. FINDINGS: There is mild ill-defined soft tissue thickening in the left perianal soft tissues, extending to the medial gluteal fold (series 2, image 95), concerning for a small developing phlegmon, fistula, or abscess.  The collection measures approximately 2.0 x 1.0 cm.  No evidence of soft tissue gas.  There is mild adjacent subcutaneous soft tissue stranding in the left medial gluteal fold.  The right medial gluteal fold and perianal soft tissues are unremarkable.  There is colonic diverticulosis without evidence of acute diverticulitis.  The appendix is in the left lower quadrant and is unremarkable.  Partially visualized loops of small and large bowel are otherwise unremarkable.  The uterus and adnexae are unremarkable.  The urinary bladder is unremarkable.  Osseous structures are intact.  Muscle bulk is maintained.  The partially visualized body wall is unremarkable.     Left perianal phlegmon or early abscess as above.  Fistula not excluded. Electronically signed by: Veto Olmedo Date:    05/18/2025 Time:    20:43  - pulls last radiology orders

## 2025-05-20 NOTE — ANESTHESIA PREPROCEDURE EVALUATION
05/20/2025  Kaylene Garcia is a 44 y.o., female.    Problem List[1]    Past Surgical History:   Procedure Laterality Date    ANOSCOPY N/A 02/12/2020    Procedure: ANOSCOPY;  Surgeon: Musa Cannon MD;  Location: [facility] OR;  Service: General;  Laterality: N/A;    BREAST BIOPSY Right 2022    INCISION AND DRAINAGE OF BUTTOCK N/A 07/14/2021    Procedure: INCISION AND DRAINAGE, BUTTOCK;  Surgeon: Jayjay Swain MD;  Location: [facility] OR;  Service: General;  Laterality: N/A;  JESSEE-RECTAL    INCISION OF PERIRECTAL ABSCESS Left 02/12/2020    Procedure: INCISION, ABSCESS, PERIRECTAL;  Surgeon: Musa Cannon MD;  Location: [facility] OR;  Service: General;  Laterality: Left;        Tobacco Use:  The patient  reports that she has been smoking cigarettes. She has a 20 pack-year smoking history. She has never used smokeless tobacco.     Results for orders placed or performed in visit on 05/18/25   EKG 12-lead    Collection Time: 05/18/25 10:55 AM   Result Value Ref Range    QRS Duration 64 ms    OHS QTC Calculation 441 ms    Narrative    Test Reason :    Vent. Rate : 100 BPM     Atrial Rate : 100 BPM     P-R Int : 160 ms          QRS Dur :  64 ms      QT Int : 342 ms       P-R-T Axes :  37  66  36 degrees    QTcB Int : 441 ms    Normal sinus rhythm  Low voltage QRS  Cannot rule out Anterior infarct (cited on or before 08-Jan-2022)  Abnormal ECG  When compared with ECG of 12-Apr-2025 13:23,  Vent. rate has increased by  41 bpm  Confirmed by Ronny Hardin ([id number]) on 5/19/2025 11:20:00 AM    Referred By: AAAREFERRAL SELF           Confirmed By: Ronny Hardin             Lab Results   Component Value Date    WBC 6.12 05/20/2025    HGB 12.1 05/20/2025    HCT 37.7 05/20/2025    MCV 98 05/20/2025     05/20/2025     BMP  Lab Results   Component Value Date     05/20/2025    K 3.8 05/20/2025     05/20/2025    CO2 22 (L)  05/20/2025    BUN 9 05/20/2025    CREATININE 0.8 05/20/2025    CALCIUM 8.9 05/20/2025    ANIONGAP 9 05/20/2025    GLU 97 05/20/2025    GLU 91 05/19/2025    GLU 91 05/18/2025       No results found for this or any previous visit.           Pre-op Assessment    I have reviewed the Patient Summary Reports.     I have reviewed the Nursing Notes. I have reviewed the NPO Status.   I have reviewed the Medications.     Review of Systems  Anesthesia Hx:  No problems with previous Anesthesia   Neg history of prior surgery.          Denies Family Hx of Anesthesia complications.    Denies Personal Hx of Anesthesia complications.                    Social:  Alcohol Use, Smoker ETOH abuse and recent withdrawls      Hematology/Oncology:  Hematology Normal   Oncology Normal                                   EENT/Dental:  EENT/Dental Normal           Cardiovascular:     Hypertension           hyperlipidemia                         Hypertension         Pulmonary:  Pulmonary Normal                       Renal/:  Renal/ Normal                 Hepatic/GI:  Hepatic/GI Normal                    Musculoskeletal:  Musculoskeletal Normal                Neurological:      Headaches      Dx of Headaches                           Endocrine:  Endocrine Normal            Dermatological:  Skin Normal    Psych:  Psychiatric History                  Physical Exam  General: Well nourished and Alert    Airway:  Mallampati: II   Mouth Opening: Normal  TM Distance: Normal  Tongue: Normal  Neck ROM: Normal ROM    Chest/Lungs:  Clear to auscultation, Normal Respiratory Rate    Heart:  Rate: Normal  Rhythm: Regular Rhythm  Sounds: Normal        Anesthesia Plan  Type of Anesthesia, risks & benefits discussed:    Anesthesia Type: Gen ETT  Intra-op Monitoring Plan: Standard ASA Monitors  Post Op Pain Control Plan: multimodal analgesia  Induction:  IV  Informed Consent: Informed consent signed with the Patient and all parties understand the risks and agree  with anesthesia plan.  All questions answered. Patient consented to blood products? Yes  ASA Score: 3    Ready For Surgery From Anesthesia Perspective.     .           [1]   Patient Active Problem List  Diagnosis    Anxiety    Atypical squamous cells of undetermined significance on cytologic smear of cervix (ASC-US)    Cervical high risk human papillomavirus (HPV) DNA test positive    Mixed hyperlipidemia    Perirectal abscess    Encounter for postoperative care    Alcohol withdrawal syndrome with complication    Tobacco dependency    Acute cystitis    Perianal abscess

## 2025-05-20 NOTE — BRIEF OP NOTE
[facility]  Brief Operative Note    SUMMARY     Surgery Date: 5/20/2025     Surgeons and Role:     * Urban Rizzo III, MD - Primary    Assisting Surgeon: None    Pre-op Diagnosis:  Perirectal abscess [K61.1]    Post-op Diagnosis:  Post-Op Diagnosis Codes:     * Perirectal abscess [K61.1]    Procedure(s) (LRB):  Exam under anesthesia (Left)  Incision and drainage of perirectal abscess    Anesthesia: General    Implants:  * No implants in log *    Operative Findings: small abscess left perirectal tissue. Small amount of pus. Cavity opened. Fluid cultured. Cavity packed with a quarter inch penrose.     Patient brought to the OR and transferred to the OR table in the supine position. She was given general anesthesia and intubated. She was put in the lithotomy position. Mariangel rectum prepped and draped. The abscess located at a previous site in the left mariangel rectum. Incision made into the previous incision. Entered into a cavity. Small amount of pus. It was cultured. Cavity irrigated. I placed a quarter inch penrose. It was sutured to the skin. Wound bandaged. She was put back in the supine position. She was extubated and brought to the RR in stable condition.     Estimated Blood Loss: 5 mL  Complications none   Estimated Blood Loss has been documented.       Instrument counts correct  Specimens:   Specimen (24h ago, onward)      None          * No specimens in log *    [id number]

## 2025-05-20 NOTE — TRANSFER OF CARE
"Anesthesia Transfer of Care Note    Patient: Kaylene Garcia    Procedure(s) Performed: Procedure(s) (LRB):  Exam under anesthesia (Left)    Patient location: PACU    Anesthesia Type: general    Transport from OR: Transported from OR on 2-3 L/min O2 by NC with adequate spontaneous ventilation    Post pain: adequate analgesia    Post assessment: no apparent anesthetic complications    Post vital signs: stable    Level of consciousness: awake and alert    Nausea/Vomiting: no nausea/vomiting    Complications: none    Transfer of care protocol was followed      Last vitals: Visit Vitals  /84   Pulse 82   Temp 36.7 °C (98.1 °F) (Oral)   Resp 18   Ht 5' 5" (1.651 m)   Wt 96.9 kg (213 lb 10 oz)   LMP 05/04/2025 (Approximate)   SpO2 95%   Breastfeeding No   BMI 35.55 kg/m²     "

## 2025-05-20 NOTE — NURSING
Gage at bedside for transfer. Pt belongings gathered and given to patient. Pt transported off unit be be transferred to [facility].

## 2025-05-20 NOTE — ANESTHESIA POSTPROCEDURE EVALUATION
Anesthesia Post Evaluation    Patient: Kaylene Garcia    Procedure(s) Performed: Procedure(s) (LRB):  Exam under anesthesia (Left)    Final Anesthesia Type: general      Patient location during evaluation: PACU  Patient participation: Yes- Able to Participate  Level of consciousness: awake and alert and oriented  Post-procedure vital signs: reviewed and stable  Pain management: adequate  Airway patency: patent    PONV status at discharge: No PONV  Anesthetic complications: no      Cardiovascular status: blood pressure returned to baseline and hemodynamically stable  Respiratory status: unassisted, spontaneous ventilation and room air  Hydration status: euvolemic  Follow-up not needed.              Vitals Value Taken Time   /59 05/20/25 12:01   Temp 36.7 °C (98 °F) 05/20/25 11:10   Pulse 61 05/20/25 12:11   Resp 28 05/20/25 12:10   SpO2 93 % 05/20/25 12:11   Vitals shown include unfiled device data.      Event Time   Out of Recovery 05/20/2025 12:12:00         Pain/Gisell Score: Pain Rating Prior to Med Admin: 5 (5/20/2025 12:03 PM)  Gisell Score: 10 (5/20/2025 11:30 AM)

## 2025-05-21 VITALS
OXYGEN SATURATION: 95 % | WEIGHT: 213.63 LBS | HEART RATE: 67 BPM | DIASTOLIC BLOOD PRESSURE: 90 MMHG | HEIGHT: 65 IN | TEMPERATURE: 98 F | RESPIRATION RATE: 18 BRPM | BODY MASS INDEX: 35.59 KG/M2 | SYSTOLIC BLOOD PRESSURE: 132 MMHG

## 2025-05-21 LAB
ABSOLUTE EOSINOPHIL (SMH): 0.28 K/UL
ABSOLUTE MONOCYTE (SMH): 0.67 K/UL (ref 0.3–1)
ABSOLUTE NEUTROPHIL COUNT (SMH): 3.2 K/UL (ref 1.8–7.7)
ALBUMIN SERPL-MCNC: 3.8 G/DL (ref 3.5–5.2)
ALP SERPL-CCNC: 63 UNIT/L (ref 55–135)
ALT SERPL-CCNC: 11 UNIT/L (ref 10–44)
ANION GAP (SMH): 5 MMOL/L (ref 8–16)
AST SERPL-CCNC: 14 UNIT/L (ref 10–40)
BACTERIA SPEC AEROBE CULT: NORMAL
BASOPHILS # BLD AUTO: 0.06 K/UL
BASOPHILS NFR BLD AUTO: 1 %
BILIRUB SERPL-MCNC: 0.3 MG/DL (ref 0.1–1)
BUN SERPL-MCNC: 8 MG/DL (ref 6–20)
CALCIUM SERPL-MCNC: 9.4 MG/DL (ref 8.7–10.5)
CHLORIDE SERPL-SCNC: 107 MMOL/L (ref 95–110)
CO2 SERPL-SCNC: 27 MMOL/L (ref 23–29)
CREAT SERPL-MCNC: 0.8 MG/DL (ref 0.5–1.4)
ERYTHROCYTE [DISTWIDTH] IN BLOOD BY AUTOMATED COUNT: 13.3 % (ref 11.5–14.5)
GFR SERPLBLD CREATININE-BSD FMLA CKD-EPI: >60 ML/MIN/1.73/M2
GLUCOSE SERPL-MCNC: 85 MG/DL (ref 70–110)
GRAM STN SPEC: NORMAL
HCT VFR BLD AUTO: 37.5 % (ref 37–48.5)
HCV AB SERPL QL IA: REACTIVE
HGB BLD-MCNC: 12.2 GM/DL (ref 12–16)
IMM GRANULOCYTES # BLD AUTO: 0.03 K/UL (ref 0–0.04)
IMM GRANULOCYTES NFR BLD AUTO: 0.5 % (ref 0–0.5)
LYMPHOCYTES # BLD AUTO: 1.85 K/UL (ref 1–4.8)
MAGNESIUM SERPL-MCNC: 1.7 MG/DL (ref 1.6–2.6)
MCH RBC QN AUTO: 31.9 PG (ref 27–31)
MCHC RBC AUTO-ENTMCNC: 32.5 G/DL (ref 32–36)
MCV RBC AUTO: 98 FL (ref 82–98)
NUCLEATED RBC (/100WBC) (SMH): 0 /100 WBC
PHOSPHATE SERPL-MCNC: 3.9 MG/DL (ref 2.7–4.5)
PLATELET # BLD AUTO: 325 K/UL (ref 150–450)
PMV BLD AUTO: 10.5 FL (ref 9.2–12.9)
POTASSIUM SERPL-SCNC: 5 MMOL/L (ref 3.5–5.1)
PROT SERPL-MCNC: 6.3 GM/DL (ref 6–8.4)
RBC # BLD AUTO: 3.83 M/UL (ref 4–5.4)
RELATIVE EOSINOPHIL (SMH): 4.6 % (ref 0–8)
RELATIVE LYMPHOCYTE (SMH): 30.4 % (ref 18–48)
RELATIVE MONOCYTE (SMH): 11 % (ref 4–15)
RELATIVE NEUTROPHIL (SMH): 52.5 % (ref 38–73)
SODIUM SERPL-SCNC: 139 MMOL/L (ref 136–145)
WBC # BLD AUTO: 6.09 K/UL (ref 3.9–12.7)

## 2025-05-21 PROCEDURE — S4991 NICOTINE PATCH NONLEGEND: HCPCS

## 2025-05-21 PROCEDURE — 25000003 PHARM REV CODE 250: Performed by: HOSPITALIST

## 2025-05-21 PROCEDURE — 25000003 PHARM REV CODE 250

## 2025-05-21 PROCEDURE — 82435 ASSAY OF BLOOD CHLORIDE: CPT | Performed by: HOSPITALIST

## 2025-05-21 PROCEDURE — 63600175 PHARM REV CODE 636 W HCPCS: Performed by: HOSPITALIST

## 2025-05-21 PROCEDURE — 36415 COLL VENOUS BLD VENIPUNCTURE: CPT

## 2025-05-21 PROCEDURE — 83735 ASSAY OF MAGNESIUM: CPT

## 2025-05-21 PROCEDURE — 85025 COMPLETE CBC W/AUTO DIFF WBC: CPT

## 2025-05-21 PROCEDURE — 84100 ASSAY OF PHOSPHORUS: CPT

## 2025-05-21 PROCEDURE — 63600175 PHARM REV CODE 636 W HCPCS

## 2025-05-21 RX ORDER — IBUPROFEN 200 MG
1 TABLET ORAL DAILY
Qty: 30 PATCH | Refills: 0 | Status: SHIPPED | OUTPATIENT
Start: 2025-05-21

## 2025-05-21 RX ORDER — SULFAMETHOXAZOLE AND TRIMETHOPRIM 800; 160 MG/1; MG/1
1 TABLET ORAL 2 TIMES DAILY
Qty: 10 TABLET | Refills: 0 | Status: SHIPPED | OUTPATIENT
Start: 2025-05-21 | End: 2025-05-26

## 2025-05-21 RX ORDER — THIAMINE HYDROCHLORIDE 100 MG/ML
200 INJECTION, SOLUTION INTRAMUSCULAR; INTRAVENOUS DAILY
Qty: 180 ML | Refills: 3 | Status: SHIPPED | OUTPATIENT
Start: 2025-05-22 | End: 2026-05-22

## 2025-05-21 RX ORDER — HYDROCODONE BITARTRATE AND ACETAMINOPHEN 5; 325 MG/1; MG/1
1 TABLET ORAL EVERY 6 HOURS PRN
Qty: 15 TABLET | Refills: 0 | Status: SHIPPED | OUTPATIENT
Start: 2025-05-21 | End: 2025-05-26

## 2025-05-21 RX ORDER — FOLIC ACID 1 MG/1
1 TABLET ORAL DAILY
Qty: 90 TABLET | Refills: 3 | Status: SHIPPED | OUTPATIENT
Start: 2025-05-22 | End: 2026-05-22

## 2025-05-21 RX ADMIN — HYDROCODONE BITARTRATE AND ACETAMINOPHEN 1 TABLET: 5; 325 TABLET ORAL at 12:05

## 2025-05-21 RX ADMIN — THIAMINE HYDROCHLORIDE 200 MG: 100 INJECTION, SOLUTION INTRAMUSCULAR; INTRAVENOUS at 07:05

## 2025-05-21 RX ADMIN — THERA TABS 1 TABLET: TAB at 07:05

## 2025-05-21 RX ADMIN — FOLIC ACID 1 MG: 1 TABLET ORAL at 07:05

## 2025-05-21 RX ADMIN — HYDROCODONE BITARTRATE AND ACETAMINOPHEN 1 TABLET: 5; 325 TABLET ORAL at 07:05

## 2025-05-21 RX ADMIN — SENNOSIDES, DOCUSATE SODIUM 1 TABLET: 50; 8.6 TABLET, FILM COATED ORAL at 07:05

## 2025-05-21 RX ADMIN — VANCOMYCIN HYDROCHLORIDE 1250 MG: 1.25 INJECTION, POWDER, LYOPHILIZED, FOR SOLUTION INTRAVENOUS at 12:05

## 2025-05-21 RX ADMIN — CHLORDIAZEPOXIDE HYDROCHLORIDE 10 MG: 5 CAPSULE ORAL at 07:05

## 2025-05-21 RX ADMIN — SODIUM CHLORIDE, POTASSIUM CHLORIDE, SODIUM LACTATE AND CALCIUM CHLORIDE: 600; 310; 30; 20 INJECTION, SOLUTION INTRAVENOUS at 02:05

## 2025-05-21 RX ADMIN — ONDANSETRON 4 MG: 2 INJECTION INTRAMUSCULAR; INTRAVENOUS at 07:05

## 2025-05-21 RX ADMIN — NICOTINE 1 PATCH: 21 PATCH, EXTENDED RELEASE TRANSDERMAL at 07:05

## 2025-05-21 NOTE — DISCHARGE INSTRUCTIONS
WOUND CARE  - Change the gauze out at least twice per day. And PRN if it gets saturated. Shower or Sitz bath after bowel movements.

## 2025-05-21 NOTE — NURSING
Per Dr Ortiz: Change the gauze out at least twice per day. And PRN if it gets saturated. Shower or Sitz bath after bowel movements.  I'll see her back in the office in one week to pull the drain

## 2025-05-21 NOTE — PLAN OF CARE
SW sent patient information to [facility] via [employer].       05/21/25 9977   Post-Acute Status   Post-Acute Authorization Home [facility]   Home Health Status Referrals Sent   Patient choice form signed by patient/caregiver List from System Post-Acute Care

## 2025-05-21 NOTE — PLAN OF CARE
Pt cleared from [facility]-  [facility] to transport home at dc  Appts added to AVS, referrals added to AVS  Home health referrals for [facility] sent- SOC TBD   05/21/25 1309   Final Note   Assessment Type Final Discharge Note   Anticipated Discharge Disposition Home-Health   Hospital Resources/Appts/Education Provided Appointments scheduled and added to AVS   Post-Acute Status   Post-Acute Authorization Home Health   Home Health Status Referrals Sent   Discharge Delays None known at this time

## 2025-05-22 ENCOUNTER — NURSE TRIAGE (OUTPATIENT)
Dept: ADMINISTRATIVE | Facility: CLINIC | Age: 45
End: 2025-05-22
Payer: COMMERCIAL

## 2025-05-22 LAB
HBV CORE AB SERPL QL IA: NEGATIVE
HBV SURFACE AB SER QL: NON REACTIVE
HBV SURFACE AG SERPL QL IA: NEGATIVE

## 2025-05-22 NOTE — TELEPHONE ENCOUNTER
MS pt    Reports she was told she would hear from home health  Was told that home health was going to see her.       Denies new or worsening symptoms.     Provided pt with number from S, routed to provider office requesting callback:  [phone]      Reason for Disposition   General information question, no triage required and triager able to answer question    Protocols used: Information Only Call - No Triage-A-OH

## 2025-05-23 ENCOUNTER — PATIENT MESSAGE (OUTPATIENT)
Dept: CASE MANAGEMENT | Facility: HOSPITAL | Age: 45
End: 2025-05-23

## 2025-05-23 ENCOUNTER — RESULTS FOLLOW-UP (OUTPATIENT)
Dept: HEPATOLOGY | Facility: HOSPITAL | Age: 45
End: 2025-05-23

## 2025-05-23 RX ORDER — FLUCONAZOLE 150 MG/1
150 TABLET ORAL DAILY
Qty: 1 TABLET | Refills: 0 | Status: SHIPPED | OUTPATIENT
Start: 2025-05-23 | End: 2025-05-24

## 2025-05-24 LAB
BACTERIA BLD CULT: NORMAL
BACTERIA BLD CULT: NORMAL
BACTERIA SPEC AEROBE CULT: NO GROWTH
BACTERIA SPEC ANAEROBE CULT: NORMAL

## 2025-05-27 ENCOUNTER — TELEPHONE (OUTPATIENT)
Dept: SURGERY | Facility: CLINIC | Age: 45
End: 2025-05-27
Payer: COMMERCIAL

## 2025-05-27 LAB
FUNGUS SPEC CULT: NORMAL
MYCOBACTERIUM SPEC QL CULT: NORMAL

## 2025-05-27 NOTE — TELEPHONE ENCOUNTER
Advised per Dr Rizzo that it is time that the drain came out.  Continue wound care.  Pt requesting a return to work note, will return 5-

## 2025-05-27 NOTE — TELEPHONE ENCOUNTER
----- Message from Zenobia sent at 5/27/2025 11:48 AM CDT -----  Type:  Needs Medical AdviceWho Called: ptSymptoms (please be specific): drain tube fell out Would the patient rather a call back or a response via [employer]? Call [facility] Call Back Number: [phone] Additional Information: pt st she just had procedure done. she went took a shower & notuced the drainage put in is totally out & missing. she wants to know what to do? should she come in and have another one placed in.  please call to discuss

## 2025-05-29 NOTE — HOSPITAL COURSE
"Patient admitted to our hospital for further management, started on broad-spectrum IV antibiotics, not in acute DT, general surgery was consulted, patient had perirectal abscess I and D,  " small abscess left perirectal tissue. Small amount of pus. Cavity opened. Fluid cultured. Cavity packed with a quarter inch penrose".  Patient doing well after I&D, no fever, no leukocytosis, surgical culture still pending.  Patient is stable for discharge after cleared by General surgery.  Patient also has a hepatitis-C antibody positive, I called the patient and discuss this, apparently patient was found to hepatitis-C antibody positive year ago, any confirmation test is negative.  I encouraged her follow up PCP again as outpatient.     Patient is cleared for discharge  "

## 2025-05-29 NOTE — DISCHARGE SUMMARY
"[facility] Medicine  Discharge Summary      Patient Name: Kaylene Garcia  MRN: [id number]  POLLO: [id number]  Patient Class: IP- Inpatient  Admission Date: 5/19/2025  Hospital Length of Stay: 2 days  Discharge Date and Time: 5/21/2025  1:17 PM  Attending Physician: No att. providers found   Discharging Provider: Lulu Valadez MD  Primary Care Provider: Soraya Bales NP    Primary Care Team: Networked reference to record PCT     HPI:   This is a 44-year-old   female history of severe alcohol abuse, perirectal abscess, hypertension, active tobacco abuse presenting here as a transfer from outside facility for perirectal abscess.  Patient apparently initially was hospitalized for alcohol intoxication, alcohol withdrawal syndrome, then suddenly developed perirectal abscess then transferred here.  Currently patient is status post surgical I and D of perirectal abscess,  no fever or chills, no nausea vomiting diarrhea, awake alert, no chest pain or SOB.  Patient has mild hand tremors.  Last drank was 2 days ago.  A pt of liquor every day.  Patient is told me that has a colonoscopy, this is a 3rd time she has a perirectal abscess.  She never diagnosed with diabetes, HIV.  No family history of inflammatory bowel disease.     Procedure(s) (LRB):  Exam under anesthesia (Left)  INCISION AND DRAINAGE, PERIRECTAL REGION      Hospital Course:   Patient admitted to our hospital for further management, started on broad-spectrum IV antibiotics, not in acute DT, general surgery was consulted, patient had perirectal abscess I and D,  " small abscess left perirectal tissue. Small amount of pus. Cavity opened. Fluid cultured. Cavity packed with a quarter inch penrose".  Patient doing well after I&D, no fever, no leukocytosis, surgical culture still pending.  Patient is stable for discharge after cleared by General surgery.  Patient also has a hepatitis-C antibody positive, I called the patient and " discuss this, apparently patient was found to hepatitis-C antibody positive year ago, any confirmation test is negative.  I encouraged her follow up PCP again as outpatient.     Patient is cleared for discharge     Goals of Care Treatment Preferences:  Code Status: Full Code      SDOH Screening:  The patient was screened for utility difficulties, food insecurity, transport difficulties, housing insecurity, and interpersonal safety and there were no concerns identified this admission.     Consults:   Consults (From admission, onward)          Status Ordering Provider     Inpatient consult to Midline team  Once        Provider:  (Not yet assigned)    Completed ARCELIA LYNCH     Inpatient consult to General Surgery  Once        Provider:  Urban Rizzo III, MD    Completed JAGDEEP MORRIS            Assessment & Plan  Perirectal abscess    Recurrent perirectal abscess.  Status post surgical I and D right now.   Continue empiric IV vancomycin and Levaquin.  IV hydration.   Patient has never has a colonoscopy- patient will need one as outpatient to rule out underlying IBD.   Check HIV/ HCV/HBV/ A1c.  Urine drug test.    Alcohol withdrawal syndrome    Cut down dose of Librium.  Folic acid and thiamine  IV hydration  Tobacco dependency  Dangers of cigarette smoking were reviewed with patient in detail. Patient was Counseled for 10 minutes or greater. Nicotine replacement options were discussed. Nicotine replacement was discussed- prescribed  Acute cystitis    Continue empiric IV Levaquin  Alcohol intoxication    Resolved  Final Active Diagnoses:    Diagnosis Date Noted POA    PRINCIPAL PROBLEM:  Perirectal abscess [K61.1] 02/12/2020 Yes    Alcohol intoxication [F10.929] 05/20/2025 Yes    Acute cystitis [N30.00] 05/19/2025 Yes    Alcohol withdrawal syndrome [F10.939] 05/18/2025 Yes    Tobacco dependency [F17.200] 05/18/2025 Yes      Problems Resolved During this Admission:       Discharged Condition:  "stable    Disposition: Home or Self Care    Follow Up:   Follow-up Information       Urban Rizzo III, MD. Schedule an appointment as soon as possible for a visit in 1 week(s).    Specialties: General Surgery, Surgery  Why: Staff messaged for appt, clinic to reach out for scheduling  Contact information:  [address]  [address]  [place] [address]  [phone]               [facility] - Podiatry/Wound Care Follow up.    Specialty: Podiatry  Why: referral placed, if you do not hear from the clinic for an appt within 48h, please contact the clinic  Contact information:  [address]  [address]  [facility] [address] [phone]  Additional information:  Please use parking lot #3 for Clinic Entrance #3. Registration is on the 1st floor, by elevators.             Soraya Bales NP Follow up on 5/30/2025.    Specialty: Family Medicine  Why: @ for hospital follow up  Contact information:  [address] [place] [address]   [place] [address] [phone]                           Patient Instructions:      Ambulatory referral/consult to Smoking Cessation Program   Standing Status: Future   Referral Priority: Routine Referral Type: Consultation   Referral Reason: Specialty Services Required   Requested Specialty: CTTS   Number of Visits Requested: 1     Ambulatory referral/consult to Wound Clinic   Standing Status: Future   Referral Priority: Routine Referral Type: Consultation   Referral Reason: Specialty Services Required   Requested Specialty: Wound Care   Number of Visits Requested: 1     Ambulatory referral/consult to Home Health   Standing Status: Future   Referral Priority: Routine Referral Type: Home Health   Referral Reason: Specialty Services Required   Requested Specialty: Home Health Services   Number of Visits Requested: 1       Significant Diagnostic Studies: Labs: CMP No results for input(s): "NA", "K", "CL", "CO2", "GLU", "BUN", "CREATININE", "CALCIUM", "PROT", "ALBUMIN", " ""BILITOT", "ALKPHOS", "AST", "ALT", "ANIONGAP", "ESTGFRAFRICA", "EGFRNONAA" in the last 48 hours. and CBC No results for input(s): "WBC", "HGB", "HCT", "PLT" in the last 48 hours.    Pending Diagnostic Studies:       None           Medications:  Reconciled Home Medications:      Medication List        START taking these medications      folic acid 1 MG tablet  Commonly known as: FOLVITE  Take 1 tablet (1 mg total) by mouth once daily.     multivitamin Tab  Take 1 tablet by mouth once daily.     nicotine 21 mg/24 hr  Commonly known as: NICODERM CQ  Place 1 patch onto the skin once daily.     thiamine 100 mg/mL injection  Commonly known as: B-1  Inject 2 mLs (200 mg total) into the vein once daily.            CONTINUE taking these medications      * atorvastatin 10 MG tablet  Commonly known as: LIPITOR  Take 10 mg by mouth once daily.     * atorvastatin 10 MG tablet  Commonly known as: LIPITOR  Take 1 tablet by mouth every evening.     citalopram 20 MG tablet  Commonly known as: CeleXA  Take 20 mg by mouth.     EPINEPHrine 0.3 mg/0.3 mL Atin  Commonly known as: EPIPEN 2-MAYELIN  Inject 0.6 mLs (0.6 mg total) into the muscle once. for 1 dose     hydrOXYzine HCL 25 MG tablet  Commonly known as: ATARAX  Take 1 tablet (25 mg total) by mouth 3 (three) times daily as needed for Itching or Anxiety.     ibuprofen 600 MG tablet  Commonly known as: ADVIL,MOTRIN  Take 1 tablet (600 mg total) by mouth every 6 (six) hours as needed for Pain.     montelukast 10 mg tablet  Commonly known as: SINGULAIR  TAKE 1 TABLET BY MOUTH ONCE DAILY IN THE EVENING FOR ALLERGIES     ondansetron 4 MG tablet  Commonly known as: ZOFRAN  Take 1 tablet (4 mg total) by mouth every 6 (six) hours.           * This list has 2 medication(s) that are the same as other medications prescribed for you. Read the directions carefully, and ask your doctor or other care provider to review them with you.                ASK your doctor about these medications  "     HYDROcodone-acetaminophen 5-325 mg per tablet  Commonly known as: NORCO  Take 1 tablet by mouth every 6 (six) hours as needed.  Ask about: Should I take this medication?     sulfamethoxazole-trimethoprim 800-160mg 800-160 mg Tab  Commonly known as: BACTRIM DS  Take 1 tablet by mouth 2 (two) times daily. for 5 days  Ask about: Should I take this medication?              CT Pelvis Without Contrast  Result Date: 5/18/2025  EXAMINATION: CT PELVIS WITHOUT CONTRAST CLINICAL HISTORY: Anal/rectal abscess; TECHNIQUE: Multiplanar images were obtained of the pelvis from the iliac crests through the symphysis pubis without intravenous contrast. COMPARISON: CT of the abdomen and pelvis from 02/10/2024. FINDINGS: There is mild ill-defined soft tissue thickening in the left perianal soft tissues, extending to the medial gluteal fold (series 2, image 95), concerning for a small developing phlegmon, fistula, or abscess.  The collection measures approximately 2.0 x 1.0 cm.  No evidence of soft tissue gas.  There is mild adjacent subcutaneous soft tissue stranding in the left medial gluteal fold.  The right medial gluteal fold and perianal soft tissues are unremarkable.  There is colonic diverticulosis without evidence of acute diverticulitis.  The appendix is in the left lower quadrant and is unremarkable.  Partially visualized loops of small and large bowel are otherwise unremarkable.  The uterus and adnexae are unremarkable.  The urinary bladder is unremarkable.  Osseous structures are intact.  Muscle bulk is maintained.  The partially visualized body wall is unremarkable.     Left perianal phlegmon or early abscess as above.  Fistula not excluded. Electronically signed by: Veto Olmedo Date:    05/18/2025 Time:    20:43  - pulls last radiology orders    Indwelling Lines/Drains at time of discharge:   Lines/Drains/Airways       Drain  Duration                  Open Drain 05/20/25 Tube - 1 Left Buttock Penrose 1/4 inch 9 days                     Time spent on the discharge of patient: 35 minutes         Lulu Valadez MD  Department of Hospital Medicine  [facility]

## 2025-07-15 ENCOUNTER — LAB REQUISITION (OUTPATIENT)
Dept: LAB | Facility: HOSPITAL | Age: 45
End: 2025-07-15
Attending: NURSE PRACTITIONER
Payer: COMMERCIAL

## 2025-07-15 DIAGNOSIS — N39.0 URINARY TRACT INFECTION, SITE NOT SPECIFIED: ICD-10-CM

## 2025-07-15 DIAGNOSIS — N76.89 OTHER SPECIFIED INFLAMMATION OF VAGINA AND VULVA: ICD-10-CM

## 2025-07-15 LAB
BACTERIA #/AREA URNS HPF: ABNORMAL /HPF
BILIRUB UR QL STRIP.AUTO: NEGATIVE
CLARITY UR: CLEAR
COLOR UR AUTO: YELLOW
GLUCOSE UR QL STRIP: NEGATIVE
HGB UR QL STRIP: NEGATIVE
HYALINE CASTS #/AREA URNS LPF: 0 /LPF (ref 0–1)
KETONES UR QL STRIP: NEGATIVE
LEUKOCYTE ESTERASE UR QL STRIP: ABNORMAL
MICROSCOPIC COMMENT: ABNORMAL
NITRITE UR QL STRIP: NEGATIVE
PH UR STRIP: 6 [PH]
PROT UR QL STRIP: NEGATIVE
RBC #/AREA URNS HPF: 0 /HPF (ref 0–4)
SP GR UR STRIP: 1.02
SQUAMOUS #/AREA URNS HPF: 6 /HPF
UROBILINOGEN UR STRIP-ACNC: NEGATIVE EU/DL
WBC #/AREA URNS HPF: 5 /HPF (ref 0–5)

## 2025-07-15 PROCEDURE — 81003 URINALYSIS AUTO W/O SCOPE: CPT | Performed by: NURSE PRACTITIONER

## 2025-07-15 PROCEDURE — 87086 URINE CULTURE/COLONY COUNT: CPT | Performed by: NURSE PRACTITIONER

## 2025-07-18 ENCOUNTER — HOSPITAL ENCOUNTER (EMERGENCY)
Facility: HOSPITAL | Age: 45
Discharge: HOME | End: 2025-07-18
Attending: EMERGENCY MEDICINE
Payer: COMMERCIAL

## 2025-07-18 VITALS
SYSTOLIC BLOOD PRESSURE: 142 MMHG | HEART RATE: 86 BPM | OXYGEN SATURATION: 97 % | WEIGHT: 190 LBS | DIASTOLIC BLOOD PRESSURE: 73 MMHG | HEIGHT: 65 IN | TEMPERATURE: 98 F | BODY MASS INDEX: 31.65 KG/M2 | RESPIRATION RATE: 17 BRPM

## 2025-07-18 DIAGNOSIS — L02.214 CUTANEOUS ABSCESS OF GROIN: Primary | ICD-10-CM

## 2025-07-18 LAB — BACTERIA UR CULT: ABNORMAL

## 2025-07-18 PROCEDURE — 99284 EMERGENCY DEPT VISIT MOD MDM: CPT

## 2025-07-18 PROCEDURE — 87070 CULTURE OTHR SPECIMN AEROBIC: CPT | Performed by: EMERGENCY MEDICINE

## 2025-07-18 PROCEDURE — 25000003 PHARM REV CODE 250: Performed by: EMERGENCY MEDICINE

## 2025-07-18 RX ORDER — HYDROCODONE BITARTRATE AND ACETAMINOPHEN 5; 325 MG/1; MG/1
1 TABLET ORAL EVERY 6 HOURS PRN
Qty: 12 TABLET | Refills: 0 | Status: SHIPPED | OUTPATIENT
Start: 2025-07-18 | End: 2025-07-25

## 2025-07-18 RX ORDER — OXYCODONE AND ACETAMINOPHEN 5; 325 MG/1; MG/1
1 TABLET ORAL
Refills: 0 | Status: COMPLETED | OUTPATIENT
Start: 2025-07-18 | End: 2025-07-18

## 2025-07-18 RX ORDER — CLINDAMYCIN HYDROCHLORIDE 150 MG/1
300 CAPSULE ORAL 4 TIMES DAILY
Qty: 56 CAPSULE | Refills: 0 | Status: SHIPPED | OUTPATIENT
Start: 2025-07-18 | End: 2025-07-25

## 2025-07-18 RX ADMIN — OXYCODONE HYDROCHLORIDE AND ACETAMINOPHEN 1 TABLET: 5; 325 TABLET ORAL at 01:07

## 2025-07-18 NOTE — ED NOTES
Patient received for discharge.  Patient is awake, alert, and oriented x 4.  Speech clear and appropriate.  RR regular and non labored.  Skin W/D/P.  Given written and verbal discharge instruction, with verbal understanding.  Patient given the opportunity to ask questions, with answers to meet needs.  No complaints or noted concerns.  Plan for pain control.  Family present with patient for ride home.

## 2025-07-18 NOTE — ED PROVIDER NOTES
"   History     Chief Complaint   Patient presents with    Abscess     Pt. States she has an abscess on the buttocks that "Burst yesterday. My home health nurse told me to come get checked out because I have open wounds from a recent surgery.".      HPI:  Kaylene Garcia is a 44 y.o. female with PMH as below who presents to the [facility] emergency department for evaluation of left groin/buttock abscess x1 day that began draining. She was recently on Bactrim. She just had plastic surgery. She is also following up with colorectal surgery soon.     PCP: Soraya Bales NP    Review of patient's allergies indicates:   Allergen Reactions    Pcn [penicillins] Anaphylaxis    Shellfish containing products Anaphylaxis    Latex, natural rubber     Iodine Rash    Iodine and iodide containing products Itching and Rash      Past Medical History:   Diagnosis Date    Alcohol abuse     Anxiety     Cancer     Hyperlipidemia     Hypertension     Migraine headache      Past Surgical History:   Procedure Laterality Date    ANOSCOPY N/A 02/12/2020    Procedure: ANOSCOPY;  Surgeon: Musa Cannon MD;  Location: [facility] OR;  Service: General;  Laterality: N/A;    BREAST BIOPSY Right 2022    EXAMINATION UNDER ANESTHESIA Left 5/20/2025    Procedure: Exam under anesthesia;  Surgeon: Urban Rizzo III, MD;  Location: [facility] OR;  Service: General;  Laterality: Left;    INCISION AND DRAINAGE OF BUTTOCK N/A 07/14/2021    Procedure: INCISION AND DRAINAGE, BUTTOCK;  Surgeon: Jayjay Swain MD;  Location: [facility] OR;  Service: General;  Laterality: N/A;  JESSEE-RECTAL    INCISION AND DRAINAGE OF PERIRECTAL REGION  5/20/2025    Procedure: INCISION AND DRAINAGE, PERIRECTAL REGION;  Surgeon: Urban Rizzo III, MD;  Location: [facility] OR;  Service: General;;    INCISION OF PERIRECTAL ABSCESS Left 02/12/2020    Procedure: INCISION, ABSCESS, PERIRECTAL;  Surgeon: uMsa Cannon MD;  Location: [facility] OR;  Service: General;  Laterality: " Left;       Family History   Problem Relation Name Age of Onset    Breast cancer Mother      Breast cancer Paternal Grandmother       Social History     Tobacco Use    Smoking status: Every Day     Current packs/day: 1.00     Average packs/day: 1 pack/day for 20.0 years (20.0 ttl pk-yrs)     Types: Cigarettes    Smokeless tobacco: Never   Substance and Sexual Activity    Alcohol use: Yes     Alcohol/week: 50.0 standard drinks of alcohol     Types: 50 Glasses of wine per week    Drug use: Yes     Frequency: 3.0 times per week     Types: Marijuana    Sexual activity: Yes     Partners: Male     Birth control/protection: None      Review of Systems     Review of Systems   Constitutional: Negative.  Negative for chills and fever.   HENT: Negative.     Eyes: Negative.    Respiratory: Negative.     Cardiovascular: Negative.    Gastrointestinal: Negative.    Endocrine: Negative.    Genitourinary: Negative.    Musculoskeletal: Negative.    Skin: Negative.    Allergic/Immunologic: Negative.    Neurological: Negative.    Hematological: Negative.    Psychiatric/Behavioral: Negative.     All other systems reviewed and are negative.       Physical Exam     Initial Vitals [07/18/25 1220]   BP Pulse Resp Temp SpO2   (!) 114/56 95 17 98 °F (36.7 °C) 97 %      MAP       --          Nursing notes and vital signs reviewed.  Constitutional: Patient is in moderate distress.   Head: Normocephalic. Atraumatic.   Eyes:  Conjunctivae are not pale. No scleral icterus.   ENT: Mucous membranes moist.   Neck: Supple.   Cardiovascular: Regular rate. Regular rhythm.   Pulmonary: No respiratory distress.   Abdominal: Non-distended.   Musculoskeletal: Moves all extremities. No obvious deformities.   Skin: Warm and dry. Large, open abscess draining copiously on exam. Abscess was expressed and cultured. No remaining fluctuance palpable.   Neurological:  Alert, awake, and appropriate. Normal speech. No acute lateralizing neurologic deficits  "appreciated.   Psychiatric: Normal affect.       ED Course   Procedures  Vitals:    07/18/25 1220   BP: (!) 114/56   Pulse: 95   Resp: 17   Temp: 98 °F (36.7 °C)   TempSrc: Oral   SpO2: 97%   Weight: 86.2 kg (190 lb)   Height: 5' 5" (1.651 m)     Lab Results Interpreted as Abnormal:  Labs Reviewed   CULTURE, AEROBIC  (SPECIFY SOURCE)      All Lab Results:  Results for orders placed or performed in visit on 07/15/25   Urine culture    Collection Time: 07/15/25 10:27 AM    Specimen: Urine   Result Value Ref Range    Urine Culture Culture In Progress    Urinalysis    Collection Time: 07/15/25 10:27 AM   Result Value Ref Range    Color, UA Yellow Straw, Claudia, Yellow, Light-Orange    Appearance, UA Clear Clear    pH, UA 6.0 5.0 - 8.0    Spec Grav UA 1.020 1.005 - 1.030    Protein, UA Negative Negative    Glucose, UA Negative Negative    Ketones, UA Negative Negative    Bilirubin, UA Negative Negative    Blood, UA Negative Negative    Nitrites, UA Negative Negative    Urobilinogen, UA Negative <2.0 EU/dL    Leukocyte Esterase, UA Trace (A) Negative   Urinalysis Microscopic    Collection Time: 07/15/25 10:27 AM   Result Value Ref Range    RBC, UA 0 0 - 4 /HPF    WBC, UA 5 0 - 5 /HPF    Bacteria, UA Moderate (A) None, Rare, Occasional /HPF    Squamous Epithelial Cells, UA 6 <=5 /HPF    Hyaline Casts, UA 0 0 - 1 /LPF    Microscopic Comment       Imaging Results    None          The emergency physician reviewed the vital signs / test results outlined above.     ED Discussion      Wound culture sent. Return precautions given. If symptoms worsen, she will need colorectal surgery evaluation -- she reports she has outpatient colorectal follow up.     Patient's evaluation in the ED does not suggest any emergent or life-threatening medical conditions requiring immediate intervention beyond what was provided in the ED, and I believe patient is safe for discharge. Regardless, an unremarkable evaluation in the ED does not preclude " the development or presence of a serious or life-threatening condition. As such, patient was given return instructions for any change or worsening of symptoms.       ED Medication(s) Administered:  Medications   oxyCODONE-acetaminophen 5-325 mg per tablet 1 tablet (has no administration in time range)       Prescription Management: I performed a review of the patient's current Rx medication list as input by nursing staff.    Patient's Medications   New Prescriptions    CLINDAMYCIN (CLEOCIN) 150 MG CAPSULE    Take 2 capsules (300 mg total) by mouth 4 (four) times daily. for 7 days    HYDROCODONE-ACETAMINOPHEN (NORCO) 5-325 MG PER TABLET    Take 1 tablet by mouth every 6 (six) hours as needed for Pain.   Previous Medications    ATORVASTATIN (LIPITOR) 10 MG TABLET    Take 10 mg by mouth once daily.    ATORVASTATIN (LIPITOR) 10 MG TABLET    Take 1 tablet by mouth every evening.    CITALOPRAM (CELEXA) 20 MG TABLET    Take 20 mg by mouth.    EPINEPHRINE (EPIPEN 2-MAYELIN) 0.3 MG/0.3 ML ATIN    Inject 0.6 mLs (0.6 mg total) into the muscle once. for 1 dose    FOLIC ACID (FOLVITE) 1 MG TABLET    Take 1 tablet (1 mg total) by mouth once daily.    HYDROXYZINE HCL (ATARAX) 25 MG TABLET    Take 1 tablet (25 mg total) by mouth 3 (three) times daily as needed for Itching or Anxiety.    IBUPROFEN (ADVIL,MOTRIN) 600 MG TABLET    Take 1 tablet (600 mg total) by mouth every 6 (six) hours as needed for Pain.    MONTELUKAST (SINGULAIR) 10 MG TABLET    TAKE 1 TABLET BY MOUTH ONCE DAILY IN THE EVENING FOR ALLERGIES    MULTIVITAMIN TAB    Take 1 tablet by mouth once daily.    NICOTINE (NICODERM CQ) 21 MG/24 HR    Place 1 patch onto the skin once daily.    ONDANSETRON (ZOFRAN) 4 MG TABLET    Take 1 tablet (4 mg total) by mouth every 6 (six) hours.    THIAMINE (B-1) 100 MG/ML INJECTION    Inject 2 mLs (200 mg total) into the vein once daily.   Modified Medications    No medications on file   Discontinued Medications    No medications on file          Follow-up Information       Schedule an appointment as soon as possible for a visit  with with your colorectal surgeon.               Schedule an appointment as soon as possible for a visit  with Soraya Bales NP.    Specialty: Family Medicine  Contact information:  [address] [place] [address]  [place] [address] [phone]               [facility] Emergency Dept.    Specialty: Emergency Medicine  Why: As needed, If symptoms worsen  Contact information:  [address] [place] [id number] [phone]                          Clinical Impression       ICD-10-CM ICD-9-CM   1. Cutaneous abscess of groin  L02.214 682.2      ED Disposition Condition    Discharge Stable             Anshu Dickey MD  07/18/25 [id number]

## 2025-07-21 LAB — BACTERIA SPEC AEROBE CULT: NORMAL

## (undated) DEVICE — NDL SAFETY 25G X 1.5 ECLIPSE

## (undated) DEVICE — BLADE #15 STERILE CARBON

## (undated) DEVICE — SEE MEDLINE ITEM 154981

## (undated) DEVICE — ELECTRODE REM PLYHSV RETURN 9

## (undated) DEVICE — PAD ABD 8X10 STERILE

## (undated) DEVICE — KIT COLLECTION E SWAB REGULAR

## (undated) DEVICE — TRAY SKIN SCRUB DRY PREMIUM

## (undated) DEVICE — UNDERGLOVES BIOGEL PI SZ 7 LF

## (undated) DEVICE — GLOVE SURG ULTRA TOUCH 7

## (undated) DEVICE — SLEEVE SCD EXPRESS CALF MEDIUM

## (undated) DEVICE — COVER LIGHT HANDLE 80/CA

## (undated) DEVICE — BLADE EZ CLEAN 2 1/2

## (undated) DEVICE — SOL 9P NACL IRR PIC IL

## (undated) DEVICE — GAUZE SPONGE 4X4 12PLY

## (undated) DEVICE — GLOVE SENSICARE PI GRN 8

## (undated) DEVICE — GLOVE SENSICARE PI MICRO 7.5

## (undated) DEVICE — SUT 4-0 VICRYL / P-3

## (undated) DEVICE — GAUZE SPONGE BULKEE 6X6.75IN

## (undated) DEVICE — SEE MEDLINE ITEM 146417

## (undated) DEVICE — COVER CAMERA OPERATING ROOM

## (undated) DEVICE — ANOSCOPE DISP W/LIGHT SOURCE

## (undated) DEVICE — NDL ECLIPSE SAFETY 23G 1.5IN

## (undated) DEVICE — GLOVE SURG ULTRA TOUCH 7.5

## (undated) DEVICE — SEE MEDLINE ITEM 156964

## (undated) DEVICE — STRIP PKNG STRL 1INX5YD

## (undated) DEVICE — SCRUB HIBICLENS 4% CHG 4OZ

## (undated) DEVICE — SYR B-D DISP CONTROL 10CC100/C

## (undated) DEVICE — TAPE SILK 3IN

## (undated) DEVICE — GOWN SMARTGOWN RAGLAN BLUE LG

## (undated) DEVICE — SPONGE LAP 18X18 PREWASHED

## (undated) DEVICE — GLOVE SURGEONS ULTRA TOUCH 6.5

## (undated) DEVICE — PACK CUSTOM UNIV BASIN SLI

## (undated) DEVICE — PACK MINOR SMH

## (undated) DEVICE — GOWN SURGICAL XX LARGE X LONG

## (undated) DEVICE — SEE MEDLINE ITEM 157116

## (undated) DEVICE — PACK DRAPE PERI/GYN TIBURON

## (undated) DEVICE — SOL POVIDONE PREP IODINE 4 OZ

## (undated) DEVICE — CANISTER SUCTION 3000CC

## (undated) DEVICE — SOL NACL IRR 1000ML BTL

## (undated) DEVICE — DRAPE STERILE Z BACK TABLE